# Patient Record
Sex: FEMALE | Race: WHITE | HISPANIC OR LATINO | Employment: FULL TIME | URBAN - METROPOLITAN AREA
[De-identification: names, ages, dates, MRNs, and addresses within clinical notes are randomized per-mention and may not be internally consistent; named-entity substitution may affect disease eponyms.]

---

## 2017-01-14 ENCOUNTER — HOSPITAL ENCOUNTER (INPATIENT)
Facility: MEDICAL CENTER | Age: 27
LOS: 4 days | DRG: 862 | End: 2017-01-18
Attending: EMERGENCY MEDICINE | Admitting: INTERNAL MEDICINE
Payer: MEDICAID

## 2017-01-14 ENCOUNTER — APPOINTMENT (OUTPATIENT)
Dept: RADIOLOGY | Facility: MEDICAL CENTER | Age: 27
DRG: 862 | End: 2017-01-14
Attending: EMERGENCY MEDICINE
Payer: MEDICAID

## 2017-01-14 ENCOUNTER — RESOLUTE PROFESSIONAL BILLING HOSPITAL PROF FEE (OUTPATIENT)
Dept: HOSPITALIST | Facility: MEDICAL CENTER | Age: 27
End: 2017-01-14
Payer: MEDICAID

## 2017-01-14 ENCOUNTER — APPOINTMENT (OUTPATIENT)
Dept: RADIOLOGY | Facility: MEDICAL CENTER | Age: 27
DRG: 862 | End: 2017-01-14
Attending: INTERNAL MEDICINE
Payer: MEDICAID

## 2017-01-14 DIAGNOSIS — D70.9 NEUTROPENIA, UNSPECIFIED TYPE (HCC): ICD-10-CM

## 2017-01-14 DIAGNOSIS — R11.2 NAUSEA AND VOMITING, INTRACTABILITY OF VOMITING NOT SPECIFIED, UNSPECIFIED VOMITING TYPE: ICD-10-CM

## 2017-01-14 PROBLEM — A41.9 SEPSIS (HCC): Status: ACTIVE | Noted: 2017-01-14

## 2017-01-14 LAB
ALBUMIN SERPL BCP-MCNC: 4.6 G/DL (ref 3.2–4.9)
ALBUMIN/GLOB SERPL: 1 G/DL
ALP SERPL-CCNC: 127 U/L (ref 30–99)
ALT SERPL-CCNC: 48 U/L (ref 2–50)
ANION GAP SERPL CALC-SCNC: 12 MMOL/L (ref 0–11.9)
ANISOCYTOSIS BLD QL SMEAR: ABNORMAL
APPEARANCE UR: ABNORMAL
APTT PPP: 32.5 SEC (ref 24.7–36)
AST SERPL-CCNC: 19 U/L (ref 12–45)
BACTERIA #/AREA URNS HPF: ABNORMAL /HPF
BASOPHILS # BLD AUTO: 4.3 % (ref 0–1.8)
BASOPHILS # BLD: 0.06 K/UL (ref 0–0.12)
BILIRUB SERPL-MCNC: 0.6 MG/DL (ref 0.1–1.5)
BILIRUB UR QL STRIP.AUTO: NEGATIVE
BUN SERPL-MCNC: 10 MG/DL (ref 8–22)
CALCIUM SERPL-MCNC: 9.7 MG/DL (ref 8.5–10.5)
CHLORIDE SERPL-SCNC: 97 MMOL/L (ref 96–112)
CO2 SERPL-SCNC: 21 MMOL/L (ref 20–33)
COLOR UR: YELLOW
CORTIS SERPL-MCNC: 16.7 UG/DL (ref 0–23)
CREAT SERPL-MCNC: 0.52 MG/DL (ref 0.5–1.4)
CULTURE IF INDICATED INDCX: YES UA CULTURE
EOSINOPHIL # BLD AUTO: 0.08 K/UL (ref 0–0.51)
EOSINOPHIL NFR BLD: 5.2 % (ref 0–6.9)
EPI CELLS #/AREA URNS HPF: ABNORMAL /HPF
ERYTHROCYTE [DISTWIDTH] IN BLOOD BY AUTOMATED COUNT: 47.9 FL (ref 35.9–50)
FLUAV H1 2009 PAND RNA SPEC QL NAA+PROBE: NOT DETECTED
FLUAV RNA SPEC QL NAA+PROBE: NEGATIVE
FLUAV+FLUBV AG SPEC QL IA: NORMAL
FLUBV RNA SPEC QL NAA+PROBE: NEGATIVE
GFR SERPL CREATININE-BSD FRML MDRD: >60 ML/MIN/1.73 M 2
GLOBULIN SER CALC-MCNC: 4.6 G/DL (ref 1.9–3.5)
GLUCOSE SERPL-MCNC: 119 MG/DL (ref 65–99)
GLUCOSE UR STRIP.AUTO-MCNC: NEGATIVE MG/DL
HCG SERPL QL: NEGATIVE
HCT VFR BLD AUTO: 32.9 % (ref 37–47)
HGB BLD-MCNC: 10.6 G/DL (ref 12–16)
HYALINE CASTS #/AREA URNS LPF: ABNORMAL /LPF
INR PPP: 1.06 (ref 0.87–1.13)
KETONES UR STRIP.AUTO-MCNC: 10 MG/DL
LACTATE BLD-SCNC: 0.8 MMOL/L (ref 0.5–2)
LACTATE BLD-SCNC: 1.1 MMOL/L (ref 0.5–2)
LACTATE BLD-SCNC: 1.2 MMOL/L (ref 0.5–2)
LEUKOCYTE ESTERASE UR QL STRIP.AUTO: NEGATIVE
LG PLATELETS BLD QL SMEAR: NORMAL
LIPASE SERPL-CCNC: 6 U/L (ref 11–82)
LYMPHOCYTES # BLD AUTO: 0.71 K/UL (ref 1–4.8)
LYMPHOCYTES NFR BLD: 47 % (ref 22–41)
MAGNESIUM SERPL-MCNC: 2.1 MG/DL (ref 1.5–2.5)
MANUAL DIFF BLD: NORMAL
MCH RBC QN AUTO: 24.9 PG (ref 27–33)
MCHC RBC AUTO-ENTMCNC: 32.2 G/DL (ref 33.6–35)
MCV RBC AUTO: 77.2 FL (ref 81.4–97.8)
MICRO URNS: ABNORMAL
MICROCYTES BLD QL SMEAR: ABNORMAL
MONOCYTES # BLD AUTO: 0.38 K/UL (ref 0–0.85)
MONOCYTES NFR BLD AUTO: 25.2 % (ref 0–13.4)
MORPHOLOGY BLD-IMP: NORMAL
MUCOUS THREADS #/AREA URNS HPF: ABNORMAL /HPF
NEUTROPHILS # BLD AUTO: 0.27 K/UL (ref 2–7.15)
NEUTROPHILS NFR BLD: 17.4 % (ref 44–72)
NEUTS BAND NFR BLD MANUAL: 0.9 % (ref 0–10)
NITRITE UR QL STRIP.AUTO: NEGATIVE
NRBC # BLD AUTO: 0 K/UL
NRBC BLD AUTO-RTO: 0 /100 WBC
PH UR STRIP.AUTO: 6 [PH]
PHOSPHATE SERPL-MCNC: 2.5 MG/DL (ref 2.5–4.5)
PLATELET # BLD AUTO: 376 K/UL (ref 164–446)
PLATELET BLD QL SMEAR: NORMAL
PMV BLD AUTO: 8.9 FL (ref 9–12.9)
POTASSIUM SERPL-SCNC: 3.2 MMOL/L (ref 3.6–5.5)
PROT SERPL-MCNC: 9.2 G/DL (ref 6–8.2)
PROT UR QL STRIP: 70 MG/DL
PROTHROMBIN TIME: 14.1 SEC (ref 12–14.6)
RBC # BLD AUTO: 4.26 M/UL (ref 4.2–5.4)
RBC # URNS HPF: ABNORMAL /HPF
RBC BLD AUTO: PRESENT
RBC UR QL AUTO: ABNORMAL
SIGNIFICANT IND 70042: NORMAL
SITE SITE: NORMAL
SODIUM SERPL-SCNC: 130 MMOL/L (ref 135–145)
SOURCE SOURCE: NORMAL
SP GR UR STRIP.AUTO: 1.02
WBC # BLD AUTO: 1.5 K/UL (ref 4.8–10.8)
WBC #/AREA URNS HPF: ABNORMAL /HPF

## 2017-01-14 PROCEDURE — 80053 COMPREHEN METABOLIC PANEL: CPT

## 2017-01-14 PROCEDURE — 85007 BL SMEAR W/DIFF WBC COUNT: CPT

## 2017-01-14 PROCEDURE — 82533 TOTAL CORTISOL: CPT

## 2017-01-14 PROCEDURE — 87086 URINE CULTURE/COLONY COUNT: CPT

## 2017-01-14 PROCEDURE — 85730 THROMBOPLASTIN TIME PARTIAL: CPT

## 2017-01-14 PROCEDURE — 700105 HCHG RX REV CODE 258: Performed by: EMERGENCY MEDICINE

## 2017-01-14 PROCEDURE — 83605 ASSAY OF LACTIC ACID: CPT | Mod: 91

## 2017-01-14 PROCEDURE — 83735 ASSAY OF MAGNESIUM: CPT

## 2017-01-14 PROCEDURE — 96366 THER/PROPH/DIAG IV INF ADDON: CPT

## 2017-01-14 PROCEDURE — 770022 HCHG ROOM/CARE - ICU (200)

## 2017-01-14 PROCEDURE — 87503 INFLUENZA DNA AMP PROB ADDL: CPT

## 2017-01-14 PROCEDURE — 85027 COMPLETE CBC AUTOMATED: CPT

## 2017-01-14 PROCEDURE — 700111 HCHG RX REV CODE 636 W/ 250 OVERRIDE (IP): Performed by: EMERGENCY MEDICINE

## 2017-01-14 PROCEDURE — 87040 BLOOD CULTURE FOR BACTERIA: CPT

## 2017-01-14 PROCEDURE — A9270 NON-COVERED ITEM OR SERVICE: HCPCS | Performed by: INTERNAL MEDICINE

## 2017-01-14 PROCEDURE — 85610 PROTHROMBIN TIME: CPT

## 2017-01-14 PROCEDURE — 84703 CHORIONIC GONADOTROPIN ASSAY: CPT

## 2017-01-14 PROCEDURE — 84100 ASSAY OF PHOSPHORUS: CPT

## 2017-01-14 PROCEDURE — 99291 CRITICAL CARE FIRST HOUR: CPT

## 2017-01-14 PROCEDURE — 36415 COLL VENOUS BLD VENIPUNCTURE: CPT

## 2017-01-14 PROCEDURE — 71275 CT ANGIOGRAPHY CHEST: CPT

## 2017-01-14 PROCEDURE — 71010 DX-CHEST-PORTABLE (1 VIEW): CPT

## 2017-01-14 PROCEDURE — 96375 TX/PRO/DX INJ NEW DRUG ADDON: CPT

## 2017-01-14 PROCEDURE — 81001 URINALYSIS AUTO W/SCOPE: CPT

## 2017-01-14 PROCEDURE — 700111 HCHG RX REV CODE 636 W/ 250 OVERRIDE (IP)

## 2017-01-14 PROCEDURE — 96367 TX/PROPH/DG ADDL SEQ IV INF: CPT

## 2017-01-14 PROCEDURE — 78226 HEPATOBILIARY SYSTEM IMAGING: CPT

## 2017-01-14 PROCEDURE — 83690 ASSAY OF LIPASE: CPT

## 2017-01-14 PROCEDURE — 700117 HCHG RX CONTRAST REV CODE 255: Performed by: EMERGENCY MEDICINE

## 2017-01-14 PROCEDURE — 93010 ELECTROCARDIOGRAM REPORT: CPT | Performed by: INTERNAL MEDICINE

## 2017-01-14 PROCEDURE — 93005 ELECTROCARDIOGRAM TRACING: CPT | Performed by: INTERNAL MEDICINE

## 2017-01-14 PROCEDURE — 87400 INFLUENZA A/B EACH AG IA: CPT

## 2017-01-14 PROCEDURE — 96361 HYDRATE IV INFUSION ADD-ON: CPT

## 2017-01-14 PROCEDURE — 96368 THER/DIAG CONCURRENT INF: CPT

## 2017-01-14 PROCEDURE — 74177 CT ABD & PELVIS W/CONTRAST: CPT

## 2017-01-14 PROCEDURE — 87502 INFLUENZA DNA AMP PROBE: CPT

## 2017-01-14 PROCEDURE — 96376 TX/PRO/DX INJ SAME DRUG ADON: CPT

## 2017-01-14 PROCEDURE — 700105 HCHG RX REV CODE 258: Performed by: INTERNAL MEDICINE

## 2017-01-14 PROCEDURE — 700102 HCHG RX REV CODE 250 W/ 637 OVERRIDE(OP): Performed by: INTERNAL MEDICINE

## 2017-01-14 PROCEDURE — 96365 THER/PROPH/DIAG IV INF INIT: CPT

## 2017-01-14 PROCEDURE — 700101 HCHG RX REV CODE 250: Performed by: INTERNAL MEDICINE

## 2017-01-14 PROCEDURE — 700111 HCHG RX REV CODE 636 W/ 250 OVERRIDE (IP): Performed by: INTERNAL MEDICINE

## 2017-01-14 PROCEDURE — 99291 CRITICAL CARE FIRST HOUR: CPT | Performed by: INTERNAL MEDICINE

## 2017-01-14 RX ORDER — MORPHINE SULFATE 4 MG/ML
4 INJECTION, SOLUTION INTRAMUSCULAR; INTRAVENOUS ONCE
Status: COMPLETED | OUTPATIENT
Start: 2017-01-14 | End: 2017-01-14

## 2017-01-14 RX ORDER — CEFTRIAXONE 1 G/1
1 INJECTION, POWDER, FOR SOLUTION INTRAMUSCULAR; INTRAVENOUS ONCE
Status: COMPLETED | OUTPATIENT
Start: 2017-01-14 | End: 2017-01-14

## 2017-01-14 RX ORDER — DOCUSATE SODIUM 100 MG/1
100 CAPSULE, LIQUID FILLED ORAL DAILY
COMMUNITY
End: 2018-06-13

## 2017-01-14 RX ORDER — SODIUM CHLORIDE 9 MG/ML
30 INJECTION, SOLUTION INTRAVENOUS
Status: DISCONTINUED | OUTPATIENT
Start: 2017-01-14 | End: 2017-01-18 | Stop reason: HOSPADM

## 2017-01-14 RX ORDER — ONDANSETRON 2 MG/ML
4 INJECTION INTRAMUSCULAR; INTRAVENOUS ONCE
Status: COMPLETED | OUTPATIENT
Start: 2017-01-14 | End: 2017-01-14

## 2017-01-14 RX ORDER — SODIUM CHLORIDE 9 MG/ML
INJECTION, SOLUTION INTRAVENOUS CONTINUOUS
Status: DISCONTINUED | OUTPATIENT
Start: 2017-01-14 | End: 2017-01-14

## 2017-01-14 RX ORDER — POTASSIUM CHLORIDE 20 MEQ/1
40 TABLET, EXTENDED RELEASE ORAL ONCE
Status: COMPLETED | OUTPATIENT
Start: 2017-01-14 | End: 2017-01-14

## 2017-01-14 RX ORDER — DEXTROSE MONOHYDRATE, SODIUM CHLORIDE, AND POTASSIUM CHLORIDE 50; 2.98; 9 G/1000ML; G/1000ML; G/1000ML
INJECTION, SOLUTION INTRAVENOUS CONTINUOUS
Status: DISCONTINUED | OUTPATIENT
Start: 2017-01-14 | End: 2017-01-15

## 2017-01-14 RX ORDER — OXYCODONE HYDROCHLORIDE 5 MG/1
5 TABLET ORAL EVERY 4 HOURS PRN
Status: DISCONTINUED | OUTPATIENT
Start: 2017-01-14 | End: 2017-01-18 | Stop reason: HOSPADM

## 2017-01-14 RX ORDER — POTASSIUM CHLORIDE 7.45 MG/ML
10 INJECTION INTRAVENOUS ONCE
Status: COMPLETED | OUTPATIENT
Start: 2017-01-14 | End: 2017-01-14

## 2017-01-14 RX ORDER — SODIUM CHLORIDE 9 MG/ML
1000 INJECTION, SOLUTION INTRAVENOUS ONCE
Status: COMPLETED | OUTPATIENT
Start: 2017-01-14 | End: 2017-01-14

## 2017-01-14 RX ORDER — DEXTROSE MONOHYDRATE, SODIUM CHLORIDE, AND POTASSIUM CHLORIDE 50; 1.49; 9 G/1000ML; G/1000ML; G/1000ML
INJECTION, SOLUTION INTRAVENOUS CONTINUOUS
Status: CANCELLED | OUTPATIENT
Start: 2017-01-14

## 2017-01-14 RX ORDER — ONDANSETRON 2 MG/ML
4 INJECTION INTRAMUSCULAR; INTRAVENOUS EVERY 4 HOURS PRN
Status: DISCONTINUED | OUTPATIENT
Start: 2017-01-14 | End: 2017-01-18 | Stop reason: HOSPADM

## 2017-01-14 RX ORDER — ACETAMINOPHEN 500 MG
500 TABLET ORAL EVERY 6 HOURS PRN
Status: DISCONTINUED | OUTPATIENT
Start: 2017-01-14 | End: 2017-01-18 | Stop reason: HOSPADM

## 2017-01-14 RX ORDER — MORPHINE SULFATE 4 MG/ML
2 INJECTION, SOLUTION INTRAMUSCULAR; INTRAVENOUS EVERY 4 HOURS PRN
Status: DISCONTINUED | OUTPATIENT
Start: 2017-01-14 | End: 2017-01-18 | Stop reason: HOSPADM

## 2017-01-14 RX ORDER — TRAMADOL HYDROCHLORIDE 50 MG/1
50 TABLET ORAL EVERY 6 HOURS PRN
Status: DISCONTINUED | OUTPATIENT
Start: 2017-01-14 | End: 2017-01-18 | Stop reason: HOSPADM

## 2017-01-14 RX ORDER — AZITHROMYCIN 500 MG/1
500 INJECTION, POWDER, LYOPHILIZED, FOR SOLUTION INTRAVENOUS ONCE
Status: COMPLETED | OUTPATIENT
Start: 2017-01-14 | End: 2017-01-14

## 2017-01-14 RX ORDER — ONDANSETRON 4 MG/1
4 TABLET, ORALLY DISINTEGRATING ORAL EVERY 4 HOURS PRN
Status: DISCONTINUED | OUTPATIENT
Start: 2017-01-14 | End: 2017-01-18 | Stop reason: HOSPADM

## 2017-01-14 RX ORDER — SODIUM CHLORIDE 9 MG/ML
1000 INJECTION, SOLUTION INTRAVENOUS
Status: DISCONTINUED | OUTPATIENT
Start: 2017-01-14 | End: 2017-01-18 | Stop reason: HOSPADM

## 2017-01-14 RX ADMIN — MORPHINE SULFATE 2 MG: 4 INJECTION INTRAVENOUS at 10:15

## 2017-01-14 RX ADMIN — ONDANSETRON 4 MG: 2 INJECTION, SOLUTION INTRAMUSCULAR; INTRAVENOUS at 10:15

## 2017-01-14 RX ADMIN — ACETAMINOPHEN 500 MG: 500 TABLET, FILM COATED ORAL at 21:50

## 2017-01-14 RX ADMIN — IOHEXOL 100 ML: 350 INJECTION, SOLUTION INTRAVENOUS at 11:45

## 2017-01-14 RX ADMIN — POTASSIUM CHLORIDE, DEXTROSE MONOHYDRATE AND SODIUM CHLORIDE: 300; 5; 900 INJECTION, SOLUTION INTRAVENOUS at 15:25

## 2017-01-14 RX ADMIN — AZITHROMYCIN FOR INJECTION INJECTION, POWDER, LYOPHILIZED, FOR SOLUTION 500 MG: 500 INJECTION INTRAVENOUS at 11:58

## 2017-01-14 RX ADMIN — PIPERACILLIN AND TAZOBACTAM 4.5 G: 4; .5 INJECTION, POWDER, LYOPHILIZED, FOR SOLUTION INTRAVENOUS; PARENTERAL at 13:32

## 2017-01-14 RX ADMIN — TRAMADOL HYDROCHLORIDE 50 MG: 50 TABLET, COATED ORAL at 21:50

## 2017-01-14 RX ADMIN — SODIUM CHLORIDE 1000 ML: 9 INJECTION, SOLUTION INTRAVENOUS at 10:14

## 2017-01-14 RX ADMIN — VANCOMYCIN HYDROCHLORIDE 1600 MG: 10 INJECTION, POWDER, LYOPHILIZED, FOR SOLUTION INTRAVENOUS at 14:25

## 2017-01-14 RX ADMIN — SODIUM CHLORIDE 1000 ML: 9 INJECTION, SOLUTION INTRAVENOUS at 12:45

## 2017-01-14 RX ADMIN — SODIUM CHLORIDE 1000 ML: 9 INJECTION, SOLUTION INTRAVENOUS at 13:31

## 2017-01-14 RX ADMIN — POTASSIUM CHLORIDE 10 MEQ: 7.46 INJECTION, SOLUTION INTRAVENOUS at 13:31

## 2017-01-14 RX ADMIN — PIPERACILLIN AND TAZOBACTAM 4.5 G: 4; .5 INJECTION, POWDER, LYOPHILIZED, FOR SOLUTION INTRAVENOUS; PARENTERAL at 19:28

## 2017-01-14 RX ADMIN — ONDANSETRON 4 MG: 2 INJECTION, SOLUTION INTRAMUSCULAR; INTRAVENOUS at 21:50

## 2017-01-14 RX ADMIN — MORPHINE SULFATE 2 MG: 4 INJECTION INTRAVENOUS at 11:58

## 2017-01-14 RX ADMIN — TRAMADOL HYDROCHLORIDE 50 MG: 50 TABLET, COATED ORAL at 15:21

## 2017-01-14 RX ADMIN — ONDANSETRON 4 MG: 2 INJECTION, SOLUTION INTRAMUSCULAR; INTRAVENOUS at 18:11

## 2017-01-14 RX ADMIN — POTASSIUM CHLORIDE 40 MEQ: 1500 TABLET, EXTENDED RELEASE ORAL at 21:51

## 2017-01-14 RX ADMIN — ACETAMINOPHEN 500 MG: 500 TABLET, FILM COATED ORAL at 15:52

## 2017-01-14 RX ADMIN — PIPERACILLIN AND TAZOBACTAM 4.5 G: 4; .5 INJECTION, POWDER, LYOPHILIZED, FOR SOLUTION INTRAVENOUS; PARENTERAL at 23:44

## 2017-01-14 RX ADMIN — CEFTRIAXONE 1 G: 1 INJECTION, POWDER, FOR SOLUTION INTRAMUSCULAR; INTRAVENOUS at 10:15

## 2017-01-14 RX ADMIN — ONDANSETRON 4 MG: 2 INJECTION, SOLUTION INTRAMUSCULAR; INTRAVENOUS at 11:58

## 2017-01-14 ASSESSMENT — PAIN SCALES - GENERAL
PAINLEVEL_OUTOF10: 6

## 2017-01-14 ASSESSMENT — LIFESTYLE VARIABLES: DO YOU DRINK ALCOHOL: NO

## 2017-01-14 NOTE — ED NOTES
Spoke to NM tech, states unsure if HIDA scan with be done today but states since scan is to r/o biliary leak pt does NOT have to be NPO and off of pain meds. MD Armando notified, states ok for pt to have water.     MD also updated that pain c/o pain, see new orders.

## 2017-01-14 NOTE — ED PROVIDER NOTES
ED Provider Note    CHIEF COMPLAINT  Chief Complaint   Patient presents with   • Post-Op Complications     jose a on 12/20   • N/V       HPI  Vanessa Ballesteros is a 26 y.o. female who presents complaining of fever vomiting and congestion. The patient says that on the 20th of last month she had her gallbladder removed. For the last 3 days she has been having fever at home and nausea and vomiting and says she is unable to tolerate much oral intake. She was able to take ibuprofen at 7:30 AM this morning. She is also felt a lot of sinus congestion and has a mild cough. She does not recognize any other precipitating events or exacerbating or alleviating factors.    REVIEW OF SYSTEMS no black or bloody stool or emesis no hemoptysis no pain or discomfort with urination. All other systems negative    PAST MEDICAL HISTORY  Past Medical History   Diagnosis Date   • Microcytic anemia 2/14/2016    the patient has a history of intermittent neutropenia thought to be familial however the patient has neglected to follow up with hematology and does not know what her definitive diagnosis is.    FAMILY HISTORY  Family History   Problem Relation Age of Onset   • Heart Attack Mother    • Stroke Mother    • Hypertension Maternal Grandmother    • Diabetes Maternal Grandfather    • Cancer Paternal Grandmother      lung cancer        SOCIAL HISTORY  Social History     Social History   • Marital Status: Single     Spouse Name: N/A   • Number of Children: N/A   • Years of Education: N/A     Social History Main Topics   • Smoking status: Former Smoker -- 0.25 packs/day for 2 years     Types: Cigarettes   • Smokeless tobacco: None      Comment: quit 12/12   • Alcohol Use: No   • Drug Use: No   • Sexual Activity:     Partners: Male      Comment: none      Other Topics Concern   • None     Social History Narrative       SURGICAL HISTORY  Past Surgical History   Procedure Laterality Date   • Jose A by laparoscopy  12/20/2016     Procedure: JOSE A BY  "LAPAROSCOPY;  Surgeon: Cem Pedro M.D.;  Location: SURGERY [facility];  Service:        CURRENT MEDICATIONS  Home Medications     Reviewed by Denise Gutierrez (Pharmacy Tech) on 01/14/17 at 1151  Med List Status: Complete    Medication Last Dose Status    docusate sodium (COLACE) 100 MG Cap 1/13/2017 Active    etonogestrel (NEXPLANON) 68 MG Implant implant 11/24/2014 Active    hydrocodone-acetaminophen (NORCO) 5-325 MG Tab per tablet 1/13/2017 Active                ALLERGIES  No Known Allergies    PHYSICAL EXAM  VITAL SIGNS: /66 mmHg  Pulse 118  Temp(Src) 37.5 °C (99.5 °F) (Temporal)  Resp 16  Ht 1.499 m (4' 11.02\")  Wt 62.6 kg (138 lb 0.1 oz)  BMI 27.86 kg/m2  SpO2 93%  LMP 12/20/2016   Oxygen saturation is interpreted as adequate  Constitutional: Awake and ill-appearing  HENT: Mucous membranes are dry  Eyes: No erythema or discharge or jaundice  Neck: Trachea midline no JVD no meningeal findings  Cardiovascular: Regular tachycardia  Lungs: Clear and equal bilaterally with no apparent difficulty breathing  Abdomen/Back: Obese soft diffusely mildly tender but she does not have a rigid abdomen or surgical peritoneal findings. There are multiple surgical port wounds on the abdominal wall there is slight surrounding erythema but I don't see any pus or discharge  Skin: Warm and dry  Musculoskeletal: No leg edema or calf tenderness  Neurologic: Awake and verbal and moving all extremities    CHART REVIEW  The patient's neutropenia has been noted multiple times in different hospital notes and she has been advised multiple times to follow-up with hematology. She has previously been seen by Dr. Rapp in the hospital but never followed up in the office.    Laboratory  Today a CBC shows a low white blood cell count of 1.5 with an absolute neutrophil count of 270. Hemoglobin is adequate at 10.6 and the platelet count is adequate at 376. Basic metabolic panel showed a very slightly depressed " potassium of 3.2. Alk phos is minimally elevated at 127 and lipase is normal at 6 lactic acid is normal at 1.2 INR is normal at 1.06. Urinalysis was negative for nitrite and leukocyte esterase but positive for ketones and the microscopic exam shows 5-10 white blood cells as well as 5-10 red blood cells and a few bacteria. Pregnancy test is negative.    Radiology  CT-ABDOMEN-PELVIS WITH   Final Result      1.  Interval cholecystectomy. Minimal fluid in the gallbladder fossa which is probably normal in amount postoperatively. No biliary dilation      2.  Fatty filtration the liver and mild hepatomegaly      3.  5 cm cystic structure in the right adnexa consistent with either ovarian cyst or hydrosalpinx      CT-CTA CHEST PULMONARY ARTERY W/ RECONS   Final Result      1.  Negative for pulmonary embolism or other acute thoracic abnormality      2.  Dilation of main pulmonary artery measuring 3.5 cm. This could indicate pulmonary artery hypertension. Recommend further evaluation with echocardiography      3.  Fatty infiltration of the liver               DX-CHEST-PORTABLE (1 VIEW)   Final Result      Negative single view of the chest.      NM-BILIARY (HIDA) SCAN WITH CCK    (Results Pending)     MEDICAL DECISION MAKING and DISPOSITION  In the emergency department an IV was established the patient was given intravenous fluids as well as morphine and Zofran and intravenous antibiotics. I reviewed all the findings with the patient. I reviewed the case with the hospitalist and the patient is admitted for further evaluation and further treatment. I have also reviewed the case with the patient's surgeon Dr. Gonzales.    IMPRESSION  1. Postop fever  2. Nausea and vomiting  3. Neutropenia  4. Abnormal urinalysis rule out urinary tract infection  5. Critical care time with this patient is 35 minutes         Electronically signed by: Ignacio Toscano, 1/14/2017 3:34 PM

## 2017-01-14 NOTE — IP AVS SNAPSHOT
" <p align=\"LEFT\"><IMG SRC=\"//EMRWB/blob$/Images/[email or web address]\" alt=\"Image\" WIDTH=\"50%\" HEIGHT=\"200\" BORDER=\"\"></p>                   Name:Vanessa Ballesteros  Medical Record Number:[id number]  CSN: [id number]    YOB: 1990   Age: 26 y.o.  Sex: female  HT:1.499 m (4' 11.02\") WT: 62.8 kg (138 lb 7.2 oz)          Admit Date: 1/14/2017     Discharge Date:   Today's Date: 1/18/2017  Attending Doctor:  Billie Ontiveros D.O.                  Allergies:  Review of patient's allergies indicates no known allergies.          Follow-up Information     1. Follow up with Diana Quijano M.D. In 1 week.    Specialty:  Oncology    Contact information    [address]  [address]  [facility] [id number] [phone]          2. Follow up with Cem Pedro M.D. In 1 week.    Specialty:  Surgery    Contact information    [address] [facility]  [address]  [facility] [id number] [phone]           Medication List      Take these Medications        Instructions    amoxicillin-clavulanate 875-125 MG Tabs   Commonly known as:  AUGMENTIN    Take 1 Tab by mouth every 12 hours for 7 days.   Dose:  1 Tab       docusate sodium 100 MG Caps   Commonly known as:  COLACE    Take 100 mg by mouth every day.   Dose:  100 mg       hydrocodone-acetaminophen 5-325 MG Tabs per tablet   Commonly known as:  NORCO    Take 1-2 Tabs by mouth every 6 hours as needed.   Dose:  1-2 Tab       NEXPLANON 68 MG Impl implant   Generic drug:  etonogestrel    Inject 1 Each as instructed Once.   Dose:  1 Each         "

## 2017-01-14 NOTE — IP AVS SNAPSHOT
[employer] Access Code: [email or web address]  Expires: 1/19/2017  3:03 AM    [employer]  A secure, online tool to manage your health information     [employer]’s [employer]® is a secure, online tool that connects you to your personalized health information from the privacy of your home -- day or night - making it very easy for you to manage your healthcare. Once the activation process is completed, you can even access your medical information using the [employer] michelle, which is available for free in the Apple Michelle store or Google Play store.     [employer] provides the following levels of access (as shown below):   My Chart Features   [facility] Primary Care Doctor [facility]  Specialists [facility]  Urgent  Care Non-[facility]  Primary Care  Doctor   Email your healthcare team securely and privately 24/7 X X X X   Manage appointments: schedule your next appointment; view details of past/upcoming appointments X      Request prescription refills. X      View recent personal medical records, including lab and immunizations X X X X   View health record, including health history, allergies, medications X X X X   Read reports about your outpatient visits, procedures, consult and ER notes X X X X   See your discharge summary, which is a recap of your hospital and/or ER visit that includes your diagnosis, lab results, and care plan. X X       How to register for [employer]:  1. Go to  [email or web address].  2. Click on the Sign Up Now box, which takes you to the New Member Sign Up page. You will need to provide the following information:  a. Enter your [employer] Access Code exactly as it appears at the top of this page. (You will not need to use this code after you’ve completed the sign-up process. If you do not sign up before the expiration date, you must request a new code.)   b. Enter your date of birth.   c. Enter your home email address.   d. Click Submit, and follow the next screen’s instructions.  3. Create a [employer] ID. This will be your [employer]  login ID and cannot be changed, so think of one that is secure and easy to remember.  4. Create a [employer] password. You can change your password at any time.  5. Enter your Password Reset Question and Answer. This can be used at a later time if you forget your password.   6. Enter your e-mail address. This allows you to receive e-mail notifications when new information is available in [employer].  7. Click Sign Up. You can now view your health information.    For assistance activating your [employer] account, call [phone]

## 2017-01-14 NOTE — ED NOTES
Chief Complaint   Patient presents with   • Post-Op Complications     jose a on 12/20   • N/V

## 2017-01-14 NOTE — PROGRESS NOTES
"Pharmacy Kinetics 26 y.o. female on vancomycin day # 1 2017    Currently on Vancomycin 1600 mg IV x1 followed by 1300 mg IV q8h    Indication for Treatment: empiric, neutropenic     Pertinent history per medical record: Admitted on 2017 for sepsis. Presented to ED with 3 days onset of N/V and subjective fevers. She had her gallbladder out 16. She has a h/o being neutropenic, for which she never followed up with hematology to find out definitive diagnosis. Her abdominal CT was negative and CXR clear.    Other antibiotics: Zosyn 4.5 g IV q6h    Allergies: Review of patient's allergies indicates no known allergies.     List concerns for renal function: abdominal CT     Pertinent cultures to date:   17:  Blood, peripheral x2 = in process  17:  Urine = in process    Recent Labs      17   0940   WBC  1.5*   NEUTSPOLYS  17.40*   BANDSSTABS  0.90     Recent Labs      17   0940   BUN  10   CREATININE  0.52   ALBUMIN  4.6     Blood pressure 100/66, pulse 104, temperature 36.5 °C (97.7 °F), temperature source Temporal, resp. rate 16, height 1.499 m (4' 11.02\"), weight 62.6 kg (138 lb 0.1 oz), last menstrual period 2016, SpO2 99 %. Temp (24hrs), Av.3 °C (97.3 °F), Min:36.1 °C (96.9 °F), Max:36.5 °C (97.7 °F)      A/P   1. Vancomycin dose change: new start  2. Next vancomycin level:  at 0730  3. Goal trough: 16-20 mcg/mL  4. Comments: Patient profoundly neutropenic, this is not new for her. Her ANC is 270. She was hypotensive in ED, but responded well to IVF. Per d/w admitting MD, he sees no real source of infection aside from maybe her incision sites. Patient has good renal function will start per protocol and check a level in a couple of days.     Janes Story, PharmD, BCPS        "

## 2017-01-14 NOTE — IP AVS SNAPSHOT
1/18/2017          Vanessa Ballesteros  [address]  [place] [address]    Dear Vanessa:    [facility] wants to ensure your discharge home is safe and you or your loved ones have had all your questions answered regarding your care after you leave the hospital.    You may receive a telephone call within two days of your discharge.  This call is to make certain you understand your discharge instructions as well as ensure we provided you with the best care possible during your stay with us.     The call will only last approximately 3-5 minutes and will be done by a nurse.    Once again, we want to ensure your discharge home is safe and that you have a clear understanding of any next steps in your care.  If you have any questions or concerns, please do not hesitate to contact us, we are here for you.  Thank you for choosing [facility] for your healthcare needs.    Sincerely,    Kamar Watson    [facility]

## 2017-01-14 NOTE — IP AVS SNAPSHOT
" After Visit Summary                                                                                                                  Name:Vanessa Ballesteros  Medical Record Number:[id number]  CSN: [id number]    YOB: 1990   Age: 26 y.o.  Sex: female  HT:1.499 m (4' 11.02\") WT: 62.8 kg (138 lb 7.2 oz)          Admit Date: 1/14/2017     Discharge Date:   Today's Date: 1/18/2017  Attending Doctor:  Billie Ontiveros D.O.                  Allergies:  Review of patient's allergies indicates no known allergies.            Discharge Instructions       Discharge Instructions    Discharged to home by car with self. Discharged via wheelchair, hospital escort: Yes.  Special equipment needed: Not Applicable    Be sure to schedule a follow-up appointment with your primary care doctor or any specialists as instructed.     Discharge Plan:   Diet Plan: Discussed  Activity Level: Discussed  Confirmed Follow up Appointment: Patient to Call and Schedule Appointment  Confirmed Symptoms Management: Discussed  Medication Reconciliation Updated: Yes  Influenza Vaccine Indication: Patient Refuses    I understand that a diet low in cholesterol, fat, and sodium is recommended for good health. Unless I have been given specific instructions below for another diet, I accept this instruction as my diet prescription.   Other diet: Regular diet as tolerated    Special Instructions: None    · Is patient discharged on Warfarin / Coumadin?   No     · Is patient Post Blood Transfusion?  No    Depression / Suicide Risk    As you are discharged from this [facility] facility, it is important to learn how to keep safe from harming yourself.    Recognize the warning signs:  · Abrupt changes in personality, positive or negative- including increase in energy   · Giving away possessions  · Change in eating patterns- significant weight changes-  positive or negative  · Change in sleeping patterns- unable to sleep or sleeping all the " time   · Unwillingness or inability to communicate  · Depression  · Unusual sadness, discouragement and loneliness  · Talk of wanting to die  · Neglect of personal appearance   · Rebelliousness- reckless behavior  · Withdrawal from people/activities they love  · Confusion- inability to concentrate     If you or a loved one observes any of these behaviors or has concerns about self-harm, here's what you can do:  · Talk about it- your feelings and reasons for harming yourself  · Remove any means that you might use to hurt yourself (examples: pills, rope, extension cords, firearm)  · Get professional help from the community (Mental Health, Substance Abuse, psychological counseling)  · Do not be alone:Call your Safe Contact- someone whom you trust who will be there for you.  · Call your local CRISIS HOTLINE 423-3205 or 082-382-6174  · Call your local Children's Mobile Crisis Response Team [place] (950) 403-9086 or www.[employer]  · Call the toll free National Suicide Prevention Hotlines   · National Suicide Prevention Lifeline 254-029-OEZW (6334)  · [facility] Hope Line Network 800-SUICIDE (705-2770)        Follow-up Information     1. Follow up with Diana Quijano M.D. In 1 week.    Specialty:  Oncology    Contact information    [address]  [address]  [facility] [address] [phone]          2. Follow up with Cem Pedro M.D. In 1 week.    Specialty:  Surgery    Contact information    [address] [facility]  [address]  [facility] [address] [phone]           Discharge Medication Instructions:    Below are the medications your physician expects you to take upon discharge:    Review all your home medications and newly ordered medications with your doctor and/or pharmacist. Follow medication instructions as directed by your doctor and/or pharmacist.    Please keep your medication list with you and share with your physician.               Medication List      START taking these medications         Instructions    amoxicillin-clavulanate 875-125 MG Tabs   Commonly known as:  AUGMENTIN    Take 1 Tab by mouth every 12 hours for 7 days.   Dose:  1 Tab         CONTINUE taking these medications        Instructions    docusate sodium 100 MG Caps   Commonly known as:  COLACE    Take 100 mg by mouth every day.   Dose:  100 mg       hydrocodone-acetaminophen 5-325 MG Tabs per tablet   Commonly known as:  NORCO    Take 1-2 Tabs by mouth every 6 hours as needed.   Dose:  1-2 Tab       NEXPLANON 68 MG Impl implant   Generic drug:  etonogestrel    Inject 1 Each as instructed Once.   Dose:  1 Each               Instructions           Diet / Nutrition:    Follow any diet instructions given to you by your doctor or the dietician, including how much salt (sodium) you are allowed each day.    If you are overweight, talk to your doctor about a weight reduction plan.    Activity:    Remain physically active following your doctor's instructions about exercise and activity.    Rest often.     Any time you become even a little tired or short of breath, SIT DOWN and rest.    Worsening Symptoms:    Report any of the following signs and symptoms to the doctor's office immediately:    *Pain of jaw, arm, or neck  *Chest pain not relieved by medication                               *Dizziness or loss of consciousness  *Difficulty breathing even when at rest   *More tired than usual                                       *Bleeding drainage or swelling of surgical site  *Swelling of feet, ankles, legs or stomach                 *Fever (>100ºF)  *Pink or blood tinged sputum  *Weight gain (3lbs/day or 5lbs /week)           *Shock from internal defibrillator (if applicable)  *Palpitations or irregular heartbeats                *Cool and/or numb extremities    Stroke Awareness    Common Risk Factors for Stroke include:    Age  Atrial Fibrillation  Carotid Artery Stenosis  Diabetes Mellitus  Excessive alcohol consumption  High blood  pressure  Overweight   Physical inactivity  Smoking    Warning signs and symptoms of a stroke include:    *Sudden numbness or weakness of the face, arm or leg (especially on one side of the body).  *Sudden confusion, trouble speaking or understanding.  *Sudden trouble seeing in one or both eyes.  *Sudden trouble walking, dizziness, loss of balance or coordination.Sudden severe headache with no known cause.    It is very important to get treatment quickly when a stroke occurs. If you experience any of the above warning signs, call 911 immediately.                   Disclaimer         Quit Smoking / Tobacco Use:    I understand the use of any tobacco products increases my chance of suffering from future heart disease or stroke and could cause other illnesses which may shorten my life. Quitting the use of tobacco products is the single most important thing I can do to improve my health. For further information on smoking / tobacco cessation call a Toll Free Quit Line at 1-395.669.3779 (*[employer] [place]) or 1-789.990.2708 (American Lung Association) or you can access the web based program at www.[employer].org.    [place] Tobacco Users Help Line:  (930) 928-6618       Toll Free: 1-172.745.9725  Quit Tobacco Program [facility] (710)673-7247    Crisis Hotline:    [place] Crisis Hotline:  8-452-JQXABWT or 1-700.831.6112    [place] Crisis Hotline:    1-470.647.9250 or 088-925-5247    Discharge Survey:   Thank you for choosing [facility]. We hope we did everything we could to make your hospital stay a pleasant one. You may be receiving a phone survey and we would appreciate your time and participation in answering the questions. Your input is very valuable to us in our efforts to improve our service to our patients and their families.        My signature on this form indicates that:    1. I have reviewed and understand the above information.  2. My questions regarding this information have  been answered to my satisfaction.  3. I have formulated a plan with my discharge nurse to obtain my prescribed medications for home.                  Disclaimer         __________________________________                     __________       ________                       Patient Signature                                                 Date                    Time

## 2017-01-14 NOTE — ED NOTES
Lab called with critical result at  1028, WBC 1.5, neutrophil 0.19 . Critical lab result read back to .  Dr. Toscano  Notified of critical lab result at 1028

## 2017-01-15 PROBLEM — T81.49XA CELLULITIS, WOUND, POST-OPERATIVE: Status: ACTIVE | Noted: 2017-01-15

## 2017-01-15 PROBLEM — R50.81 NEUTROPENIC FEVER (HCC): Status: ACTIVE | Noted: 2017-01-15

## 2017-01-15 PROBLEM — D70.9 NEUTROPENIC FEVER (HCC): Status: ACTIVE | Noted: 2017-01-15

## 2017-01-15 PROBLEM — R77.1 ELEVATED SERUM GLOBULIN LEVEL: Status: ACTIVE | Noted: 2017-01-15

## 2017-01-15 LAB
ALBUMIN SERPL BCP-MCNC: 4 G/DL (ref 3.2–4.9)
ALBUMIN/GLOB SERPL: 0.9 G/DL
ALP SERPL-CCNC: 108 U/L (ref 30–99)
ALT SERPL-CCNC: 33 U/L (ref 2–50)
ANION GAP SERPL CALC-SCNC: 9 MMOL/L (ref 0–11.9)
ANISOCYTOSIS BLD QL SMEAR: ABNORMAL
AST SERPL-CCNC: 16 U/L (ref 12–45)
BASOPHILS # BLD AUTO: 1.8 % (ref 0–1.8)
BASOPHILS # BLD: 0.05 K/UL (ref 0–0.12)
BILIRUB SERPL-MCNC: 0.8 MG/DL (ref 0.1–1.5)
BUN SERPL-MCNC: 3 MG/DL (ref 8–22)
CALCIUM SERPL-MCNC: 9.2 MG/DL (ref 8.5–10.5)
CHLORIDE SERPL-SCNC: 102 MMOL/L (ref 96–112)
CO2 SERPL-SCNC: 23 MMOL/L (ref 20–33)
CREAT SERPL-MCNC: 0.5 MG/DL (ref 0.5–1.4)
EKG IMPRESSION: NORMAL
EOSINOPHIL # BLD AUTO: 0.59 K/UL (ref 0–0.51)
EOSINOPHIL NFR BLD: 21.2 % (ref 0–6.9)
ERYTHROCYTE [DISTWIDTH] IN BLOOD BY AUTOMATED COUNT: 49.5 FL (ref 35.9–50)
GFR SERPL CREATININE-BSD FRML MDRD: >60 ML/MIN/1.73 M 2
GLOBULIN SER CALC-MCNC: 4.5 G/DL (ref 1.9–3.5)
GLUCOSE SERPL-MCNC: 122 MG/DL (ref 65–99)
HCT VFR BLD AUTO: 34.6 % (ref 37–47)
HGB BLD-MCNC: 10.3 G/DL (ref 12–16)
HYPOCHROMIA BLD QL SMEAR: ABNORMAL
LG PLATELETS BLD QL SMEAR: NORMAL
LYMPHOCYTES # BLD AUTO: 1.54 K/UL (ref 1–4.8)
LYMPHOCYTES NFR BLD: 54.9 % (ref 22–41)
MAGNESIUM SERPL-MCNC: 2.1 MG/DL (ref 1.5–2.5)
MANUAL DIFF BLD: NORMAL
MCH RBC QN AUTO: 23.7 PG (ref 27–33)
MCHC RBC AUTO-ENTMCNC: 29.8 G/DL (ref 33.6–35)
MCV RBC AUTO: 79.7 FL (ref 81.4–97.8)
MICROCYTES BLD QL SMEAR: ABNORMAL
MONOCYTES # BLD AUTO: 0.4 K/UL (ref 0–0.85)
MONOCYTES NFR BLD AUTO: 14.2 % (ref 0–13.4)
MORPHOLOGY BLD-IMP: NORMAL
NEUTROPHILS # BLD AUTO: 0.22 K/UL (ref 2–7.15)
NEUTROPHILS NFR BLD: 3.5 % (ref 44–72)
NEUTS BAND NFR BLD MANUAL: 4.4 % (ref 0–10)
NRBC # BLD AUTO: 0 K/UL
NRBC BLD AUTO-RTO: 0 /100 WBC
PHOSPHATE SERPL-MCNC: 2.7 MG/DL (ref 2.5–4.5)
PLATELET # BLD AUTO: 356 K/UL (ref 164–446)
PLATELET BLD QL SMEAR: NORMAL
PMV BLD AUTO: 9.1 FL (ref 9–12.9)
POTASSIUM SERPL-SCNC: 4.1 MMOL/L (ref 3.6–5.5)
PROT SERPL-MCNC: 8.5 G/DL (ref 6–8.2)
RBC # BLD AUTO: 4.34 M/UL (ref 4.2–5.4)
RBC BLD AUTO: PRESENT
SODIUM SERPL-SCNC: 134 MMOL/L (ref 135–145)
WBC # BLD AUTO: 2.8 K/UL (ref 4.8–10.8)

## 2017-01-15 PROCEDURE — 700111 HCHG RX REV CODE 636 W/ 250 OVERRIDE (IP): Performed by: INTERNAL MEDICINE

## 2017-01-15 PROCEDURE — 700111 HCHG RX REV CODE 636 W/ 250 OVERRIDE (IP)

## 2017-01-15 PROCEDURE — 700101 HCHG RX REV CODE 250: Performed by: HOSPITALIST

## 2017-01-15 PROCEDURE — 700102 HCHG RX REV CODE 250 W/ 637 OVERRIDE(OP): Performed by: INTERNAL MEDICINE

## 2017-01-15 PROCEDURE — 99232 SBSQ HOSP IP/OBS MODERATE 35: CPT | Performed by: HOSPITALIST

## 2017-01-15 PROCEDURE — 84165 PROTEIN E-PHORESIS SERUM: CPT

## 2017-01-15 PROCEDURE — 83735 ASSAY OF MAGNESIUM: CPT

## 2017-01-15 PROCEDURE — 85007 BL SMEAR W/DIFF WBC COUNT: CPT

## 2017-01-15 PROCEDURE — 700105 HCHG RX REV CODE 258: Performed by: HOSPITALIST

## 2017-01-15 PROCEDURE — A9270 NON-COVERED ITEM OR SERVICE: HCPCS | Performed by: INTERNAL MEDICINE

## 2017-01-15 PROCEDURE — 770009 HCHG ROOM/CARE - ONCOLOGY SEMI PRI*

## 2017-01-15 PROCEDURE — 85027 COMPLETE CBC AUTOMATED: CPT

## 2017-01-15 PROCEDURE — A9270 NON-COVERED ITEM OR SERVICE: HCPCS | Performed by: HOSPITALIST

## 2017-01-15 PROCEDURE — 80053 COMPREHEN METABOLIC PANEL: CPT

## 2017-01-15 PROCEDURE — 84100 ASSAY OF PHOSPHORUS: CPT

## 2017-01-15 PROCEDURE — 84160 ASSAY OF PROTEIN ANY SOURCE: CPT

## 2017-01-15 PROCEDURE — 700105 HCHG RX REV CODE 258: Performed by: INTERNAL MEDICINE

## 2017-01-15 PROCEDURE — 700102 HCHG RX REV CODE 250 W/ 637 OVERRIDE(OP): Performed by: HOSPITALIST

## 2017-01-15 RX ORDER — ECHINACEA PURPUREA EXTRACT 125 MG
2 TABLET ORAL PRN
Status: DISCONTINUED | OUTPATIENT
Start: 2017-01-15 | End: 2017-01-18 | Stop reason: HOSPADM

## 2017-01-15 RX ORDER — SODIUM CHLORIDE 9 MG/ML
INJECTION, SOLUTION INTRAVENOUS CONTINUOUS
Status: DISCONTINUED | OUTPATIENT
Start: 2017-01-15 | End: 2017-01-18 | Stop reason: HOSPADM

## 2017-01-15 RX ORDER — ALPRAZOLAM 0.25 MG/1
0.25 TABLET ORAL NIGHTLY PRN
Status: DISCONTINUED | OUTPATIENT
Start: 2017-01-15 | End: 2017-01-18 | Stop reason: HOSPADM

## 2017-01-15 RX ORDER — LORAZEPAM 2 MG/ML
0.5 INJECTION INTRAMUSCULAR EVERY 4 HOURS PRN
Status: DISCONTINUED | OUTPATIENT
Start: 2017-01-15 | End: 2017-01-18 | Stop reason: HOSPADM

## 2017-01-15 RX ADMIN — PIPERACILLIN AND TAZOBACTAM 4.5 G: 4; .5 INJECTION, POWDER, LYOPHILIZED, FOR SOLUTION INTRAVENOUS; PARENTERAL at 17:23

## 2017-01-15 RX ADMIN — Medication 2 SPRAY: at 14:15

## 2017-01-15 RX ADMIN — FAMOTIDINE 20 MG: 10 INJECTION, SOLUTION INTRAVENOUS at 22:37

## 2017-01-15 RX ADMIN — ONDANSETRON 4 MG: 2 INJECTION, SOLUTION INTRAMUSCULAR; INTRAVENOUS at 21:08

## 2017-01-15 RX ADMIN — VANCOMYCIN HYDROCHLORIDE 1300 MG: 10 INJECTION, POWDER, LYOPHILIZED, FOR SOLUTION INTRAVENOUS at 22:14

## 2017-01-15 RX ADMIN — PIPERACILLIN AND TAZOBACTAM 4.5 G: 4; .5 INJECTION, POWDER, LYOPHILIZED, FOR SOLUTION INTRAVENOUS; PARENTERAL at 06:12

## 2017-01-15 RX ADMIN — TRAMADOL HYDROCHLORIDE 50 MG: 50 TABLET, COATED ORAL at 21:08

## 2017-01-15 RX ADMIN — OXYCODONE HYDROCHLORIDE 5 MG: 5 TABLET ORAL at 17:07

## 2017-01-15 RX ADMIN — ONDANSETRON 4 MG: 4 TABLET, ORALLY DISINTEGRATING ORAL at 07:39

## 2017-01-15 RX ADMIN — SODIUM CHLORIDE: 9 INJECTION, SOLUTION INTRAVENOUS at 07:50

## 2017-01-15 RX ADMIN — ACETAMINOPHEN 500 MG: 500 TABLET, FILM COATED ORAL at 23:10

## 2017-01-15 RX ADMIN — ONDANSETRON 4 MG: 2 INJECTION, SOLUTION INTRAMUSCULAR; INTRAVENOUS at 12:21

## 2017-01-15 RX ADMIN — OXYCODONE HYDROCHLORIDE 5 MG: 5 TABLET ORAL at 11:54

## 2017-01-15 RX ADMIN — ACETAMINOPHEN 500 MG: 500 TABLET, FILM COATED ORAL at 04:08

## 2017-01-15 RX ADMIN — ONDANSETRON 4 MG: 2 INJECTION, SOLUTION INTRAMUSCULAR; INTRAVENOUS at 17:07

## 2017-01-15 RX ADMIN — LIDOCAINE HYDROCHLORIDE 15 ML: 20 SOLUTION ORAL; TOPICAL at 14:16

## 2017-01-15 RX ADMIN — VANCOMYCIN HYDROCHLORIDE 1300 MG: 10 INJECTION, POWDER, LYOPHILIZED, FOR SOLUTION INTRAVENOUS at 01:00

## 2017-01-15 RX ADMIN — VANCOMYCIN HYDROCHLORIDE 1300 MG: 10 INJECTION, POWDER, LYOPHILIZED, FOR SOLUTION INTRAVENOUS at 08:03

## 2017-01-15 RX ADMIN — PIPERACILLIN AND TAZOBACTAM 4.5 G: 4; .5 INJECTION, POWDER, LYOPHILIZED, FOR SOLUTION INTRAVENOUS; PARENTERAL at 14:12

## 2017-01-15 RX ADMIN — ONDANSETRON 4 MG: 2 INJECTION, SOLUTION INTRAMUSCULAR; INTRAVENOUS at 02:15

## 2017-01-15 RX ADMIN — ACETAMINOPHEN 500 MG: 500 TABLET, FILM COATED ORAL at 15:47

## 2017-01-15 RX ADMIN — TRAMADOL HYDROCHLORIDE 50 MG: 50 TABLET, COATED ORAL at 07:48

## 2017-01-15 ASSESSMENT — PAIN SCALES - GENERAL
PAINLEVEL_OUTOF10: 6
PAINLEVEL_OUTOF10: 7
PAINLEVEL_OUTOF10: 6
PAINLEVEL_OUTOF10: 4
PAINLEVEL_OUTOF10: 5
PAINLEVEL_OUTOF10: 6
PAINLEVEL_OUTOF10: 8
PAINLEVEL_OUTOF10: 7

## 2017-01-15 ASSESSMENT — ENCOUNTER SYMPTOMS
COUGH: 0
NAUSEA: 0
CHILLS: 0
BLURRED VISION: 0
DIZZINESS: 0
MYALGIAS: 0
HEADACHES: 0
FEVER: 0
PALPITATIONS: 0
VOMITING: 0
ABDOMINAL PAIN: 0
SHORTNESS OF BREATH: 0

## 2017-01-15 ASSESSMENT — LIFESTYLE VARIABLES
TOTAL SCORE: 0
CONSUMPTION TOTAL: NEGATIVE
ALCOHOL_USE: YES
TOTAL SCORE: 0
AVERAGE NUMBER OF DAYS PER WEEK YOU HAVE A DRINK CONTAINING ALCOHOL: 0
ON A TYPICAL DAY WHEN YOU DRINK ALCOHOL HOW MANY DRINKS DO YOU HAVE: 0
HAVE YOU EVER FELT YOU SHOULD CUT DOWN ON YOUR DRINKING: NO
TOTAL SCORE: 0
EVER FELT BAD OR GUILTY ABOUT YOUR DRINKING: NO
HOW MANY TIMES IN THE PAST YEAR HAVE YOU HAD 5 OR MORE DRINKS IN A DAY: 0
EVER HAD A DRINK FIRST THING IN THE MORNING TO STEADY YOUR NERVES TO GET RID OF A HANGOVER: NO
HAVE PEOPLE ANNOYED YOU BY CRITICIZING YOUR DRINKING: NO
EVER_SMOKED: YES
EVER_SMOKED: YES

## 2017-01-15 ASSESSMENT — COPD QUESTIONNAIRES
HAVE YOU SMOKED AT LEAST 100 CIGARETTES IN YOUR ENTIRE LIFE: YES
DURING THE PAST 4 WEEKS HOW MUCH DID YOU FEEL SHORT OF BREATH: NONE/LITTLE OF THE TIME
COPD SCREENING SCORE: 2
DO YOU EVER COUGH UP ANY MUCUS OR PHLEGM?: NO/ONLY WITH OCCASIONAL COLDS OR INFECTIONS

## 2017-01-15 NOTE — H&P
PRIMARY CARE PHYSICIAN:  Dr. Giuseppe Bravo.    CHIEF COMPLAINT:  Nausea, vomiting, and fever.    HISTORY OF PRESENTING ILLNESS:  This is a pleasant 26-year-old female who   presents to the emergency room for further evaluation of nausea, vomiting and   fever.  To note this patient underwent laparoscopic cholecystectomy with Dr. Pedro from surgery last month.  She now presents to the emergency room with   complaints of nausea, vomiting and fever.  She reports that she was febrile   at home for the last 3-4 days.  She reported that she measured her temperature   at home and this was 106.9 degree Fahrenheit last night.  When questioned why   did not she come in with into the emergency room with that temperature, she   remained uncertain to the answer.  She further informs me that she has   abdominal pain.  This is right upper quadrant in location, persistent since   last 3 days, achy and sharp in character, 6/10 in intensity, radiating towards   the left side of her belly, has not notice any improving or worsening factors   with the pain.  Denies any associated diarrhea.  Her last bowel movement was   2 days ago when she reports this to be normal; otherwise it has been   associated nausea and vomiting.  She report that she has vomited numerous time   and is unable to remember the amount or frequency of vomiting.  She was done   given they had been so numerous.  Otherwise, she denies any hemetemesis.  She   denies any sick contact.  She complains of slight headache.  Denies any chest   pain, shortness of breath or cough.  She complains of associated weakness,   lethargy, malaise, myalgias and arthralgias.  She denies any dysuria,   frequency, urgency or hematuria.  She denies any lower extremity swelling,   palpitations or orthopnea.    HOME MEDICATIONS:  1.  Nexplanon implant.  2.  Docusate sodium.  3.  [place].    PAST MEDICAL HISTORY:  1.  Anemia.  2.  Neutropenia.    PAST SURGICAL HISTORY:   Cholecystectomy.    FAMILY HISTORY:  Mother with history of neutropenia.  Grandmother with history   of lung cancer.  Grandfather with history of diabetes mellitus.    SOCIAL HISTORY:  Denies smoking, use of alcohol or drug of abuse.  Currently   living with her boyfriend and two kids.  Working at a [employer].    ALLERGIES:  No known allergies.    REVIEW OF SYSTEMS:  A detailed review of systems was reviewed with the patient   and negative other than as listed in the history of presenting illness and   past medical history.    PHYSICAL EXAMINATION:   VITAL SIGNS:  On presentation temperature of 36.1 degrees Celsius, pulse of   118, respiratory rate of 16, blood pressure of 100/66, weight of 62.6 kg, and   oxygen saturation of 96% on room air.  GENERAL:  Alert and oriented x3, in no acute distress.  HEAD, EYES, AND ENT:  Head is normocephalic.  Extraocular movements are   intact.  Pupils are equal, round, and reactive to light and accommodation.  No   conjunctival pallor.  No scleral icterus.  No nasal discharge.  No pharyngeal   exudates or erythema.  NECK:  No palpable lymphadenopathy.  No jugular venous distention.  CARDIOVASCULAR:  Regular rate and rhythm.  Patient has S1 plus S2.  No   murmurs, rubs or gallops.  She is noted to be tachycardiac.  RESPIRATIONS:  Clear to auscultation bilaterally.  No wheezing or rhonchi.  ABDOMEN:  Soft, tender to palpation in the right upper quadrant.  She is noted   to have the right sided cholecystectomy and the middle upper incision   consistent with postoperative cellulitis of the incision site; otherwise bowel   sounds are normal in frequency.  GENITOURINARY:  System was not examined.  MUSCULOSKELETAL:  No joint swelling or tenderness on examination.  SKIN:  No rashes or erythema or wound.  NEUROLOGICAL:  Cranial nerves without any gross deficit.  Motor strength is   5/5 in bilateral upper and lower extremities.  No gross sensory deficits.  EXTREMITIES:  Pulses 2+ in  bilateral lower extremities.  No edema, no   cyanosis.    LABORATORY STUDIES:  White blood cell count of 1.5, hemoglobin of 10.6,   platelet count of 376.  Sodium of 130, potassium of 2.2, BUN of 10, creatinine   of 0.52.  AST of 19, ALT of 48, alkaline phosphatase of 127, total protein of   9.2, globulin of 4.6, lipase of 6, lactic acid of 1.2.  INR of 1.06.    Urinalysis negative for any evidence of urinary tract infection but WBCs are   seen.  Cortisol is 15.7.  Pregnancy screening is negative.  Influenza   screening is negative.    IMAGING STUDIES:  CT of the chest reveals no evidence of pulmonary embolism,   dilation of main pulmonary arteries noted.  CT of the abdomen and pelvis is   negative for any acute concerns.  HIDA scan was obtained given concern for the   biliary leak and this is negative.    ASSESSMENT AND PLAN:  1.  Sepsis.  The patient presents with sepsis given findings with SIRS   criteria with underlying source of postoperative incision site cellulitis.    Given her underlying neutropenia, she has been initiated on broad spectrum   antibiotics including the use of intravenous vancomycin and Zosyn therapy.    The patient received sepsis protocol IV fluids in the emergency room.  The   patient will be admitted with sepsis protocol with monitoring of her lactic   acid levels.  Given the critical nature of her illness with systolic blood   pressure less than 90, she will be admitted to the intensive care unit for   ongoing monitoring on sepsis protocol in response to early therapy.  2.  Febrile neutropenia.  At this point, the patient has been initiated on   broad spectrum antibiotics and recommended infectious disease consultation in   the morning.  The patient has been previously seen by Dr. Quijano.    Recommend consultation from Dr. Quijano and consideration towards obtaining   a bone marrow biopsy during the hospital stay.  3.  Post cholecystectomy site incision cellulitis.  Concern for  underlying   MRSA with given underlying induration.  Continue vancomycin and Zosyn therapy.    Deescalate as appropriate.  Surgical service has been notified of inpatient   status by the emergency room physician.  4.  Hyponatremia, sepsis / dehydration related.  Recommend ongoing   monitoring with IV fluid hydration.  5.  Hypokalemia.  This has been replaced by me.  6.  Hyperproteinemia.  Recommend obtaining a serum protein electrophoresis,   this has been ordered.  Recommend further discussion of this with hematology.  7.  Concern for pulmonary hypertension on the CAT scan.  Recommend   echocardiogram which has been ordered.  8.  Preventive prophylaxis.  Deep venous thrombosis preventive prophylaxis   with subcutaneous enoxaparin and sequential compression devices has been   ordered.  Stress ulcer prophylaxes are not indicated.  9.  Code status.  The patient has been admitted to the hospital under full   code status.    This patient has critical/life threatening illness requiring my direct   presence involvement and maximal preparedness.  I personally spent 36 minutes   providing critical care to this patient.  Time spent on critical care excludes   time spent on procedures if any.       ____________________________________     MD YANETH PEREZ / ADRIAN    DD:  01/14/2017 21:35:17  DT:  01/14/2017 22:38:53    D#:  [id number]  Job#:  [id number]

## 2017-01-15 NOTE — PROGRESS NOTES
"Pharmacy Kinetics 26 y.o. female on vancomycin day # 2 1/15/2017    Currently on Vancomycin 1300 mg iv q8hr    Indication for Treatment: Neutropenic fever    Pertinent history per medical record: Admitted on 2017 for nausea, vomiting and fevers. With concurrent neutropenia also observevd, empiric antibiotic therapy initiated.    Other antibiotics: Piperacillin/tazobactam 4.5 g IV q6h    Allergies: Review of patient's allergies indicates no known allergies.     List concerns for renal function: CT with contrast 17    Pertinent cultures to date:     17 Nasopharyngeal      NGTD  17 PBC x 2       NGTD    Recent Labs      17   0940  01/15/17   0430   WBC  1.5*  2.8*   NEUTSPOLYS  17.40*  3.50*   BANDSSTABS  0.90  4.40     Recent Labs      17   0940  01/15/17   0430   BUN  10  3*   CREATININE  0.52  0.50   ALBUMIN  4.6  4.0       Intake/Output Summary (Last 24 hours) at 01/15/17 1427  Last data filed at 01/15/17 0800   Gross per 24 hour   Intake   2560 ml   Output   1775 ml   Net    785 ml      Blood pressure 100/66, pulse 93, temperature 38.2 °C (100.8 °F), temperature source Temporal, resp. rate 16, height 1.499 m (4' 11.02\"), weight 62.8 kg (138 lb 7.2 oz), last menstrual period 2016, SpO2 99 %, not currently breastfeeding. Temp (24hrs), Av.6 °C (99.6 °F), Min:36.4 °C (97.6 °F), Max:38.9 °C (102 °F)      A/P   1. Vancomycin dose change: Continue 1300 mg IV q8h scheduled dosing  2. Next vancomycin level: Trough tomorrow at 0730  3. Goal trough: 16-20 mcg/mL  4. Comments: Therapy with vancomycin continues empirically for neutropenic fever. With no major concerns for accumulation, scheduled dosing started yesterday. Renal indices stable, cultures negative to date. Will continue current scheduled dosing and follow up on trough level tomorrow to ensure appropriate clearance and drug levels. Pharmacy will continue to follow.     Cem LoweryD, BCPS      "

## 2017-01-15 NOTE — CARE PLAN
Problem: Safety  Goal: Will remain free from injury  Outcome: PROGRESSING AS EXPECTED  Mobility gait assessed and documented, bed in lowest position, and call light within reach.     Problem: Knowledge Deficit  Goal: Knowledge of disease process/condition, treatment plan, diagnostic tests, and medications will improve  Outcome: PROGRESSING AS EXPECTED  Plan of care discussed with patient, and why patient was brought to the ICU, all questions answered at this time.

## 2017-01-15 NOTE — PROGRESS NOTES
Hospital Medicine ICU Interval Note    Date of Service: 1/15/2017    Chief Complaint  26 y.o.-year-old female admitted 2017 with sepsis, recent lap jose a last month    Interval Problem Update  On day 2 of antibiotics, wbc and fevers improving.  Anxious.  CT abdomen unremarkable.  BP fine.  Can transfer to floor.    Consultants/Specialty  None    Disposition  To surgical floor        Review of Systems   Constitutional: Negative for fever and chills.   Eyes: Negative for blurred vision.   Respiratory: Negative for cough and shortness of breath.    Cardiovascular: Negative for chest pain and palpitations.   Gastrointestinal: Negative for nausea, vomiting and abdominal pain.   Genitourinary: Negative for dysuria.   Musculoskeletal: Negative for myalgias.   Neurological: Negative for dizziness and headaches.      Physical Exam  Laboratory/Imaging   Hemodynamics  Temp (24hrs), Av.4 °C (99.4 °F), Min:36.4 °C (97.6 °F), Max:38.9 °C (102 °F)   Temperature: 36.4 °C (97.6 °F)  Pulse  Av.2  Min: 75  Max: 128 Heart Rate (Monitored): 87  NIBP: (!) 93/55 mmHg      Respiratory      Respiration: 13, Pulse Oximetry: 98 %        RUL Breath Sounds: Clear, RML Breath Sounds: Diminished, RLL Breath Sounds: Diminished, RADHA Breath Sounds: Clear, LLL Breath Sounds: Diminished    Fluids  Date 01/15/17 0700 - 17 0659   Shift 4330-1433 1774-0111 1121-2353 24 Hour Total   I  N  T  A  K  E   P.O. 30   30    I.V. 450   450    Shift Total 480   480   O  U  T  P  U  T   Shift Total       Weight (kg) 62.8 62.8 62.8 62.8         Nutrition  Orders Placed This Encounter   Procedures   • DIET ORDER     Standing Status: Standing      Number of Occurrences: 1      Standing Expiration Date:      Order Specific Question:  Diet:     Answer:  Clear Liquid [10]     Physical Exam   Constitutional: She is oriented to person, place, and time.   Cardiovascular: Normal rate, regular rhythm and normal heart sounds.    No murmur  heard.  Pulmonary/Chest: Effort normal and breath sounds normal. No respiratory distress. She has no wheezes. She has no rales.   Abdominal: Soft. Bowel sounds are normal. She exhibits no distension. There is tenderness (to palpation).   Stab sites intact, though there is mild cellulitis around one   Musculoskeletal: Normal range of motion. She exhibits no edema.   Neurological: She is alert and oriented to person, place, and time.   Skin: Skin is warm and dry. No erythema.   Nursing note and vitals reviewed.      Recent Labs      01/14/17   0940  01/15/17   0430   WBC  1.5*  2.8*   RBC  4.26  4.34   HEMOGLOBIN  10.6*  10.3*   HEMATOCRIT  32.9*  34.6*   MCV  77.2*  79.7*   MCH  24.9*  23.7*   MCHC  32.2*  29.8*   RDW  47.9  49.5   PLATELETCT  376  356   MPV  8.9*  9.1     Recent Labs      01/14/17   0940  01/15/17   0430   SODIUM  130*  134*   POTASSIUM  3.2*  4.1   CHLORIDE  97  102   CO2  21  23   GLUCOSE  119*  122*   BUN  10  3*   CREATININE  0.52  0.50   CALCIUM  9.7  9.2     Recent Labs      01/14/17   0940   APTT  32.5   INR  1.06                  Assessment/Plan     Sepsis (CMS-HCC)  Assessment & Plan  -improved, bp fine with fluids  -continue abx, cx so far negative (blood, urine, flu, ua neg)  -lactate normal    Neutropenic fever (CMS-HCC)  Assessment & Plan  -subsiding, neutropenia improving  -looks as though she's had this before, and in the family  -?consult oncology tomorrow    Abdominal pain  Assessment & Plan  -oixycodone prn    Elevated serum globulin level  Assessment & Plan  -SPEP ordered    Cellulitis, wound, post-operative  Assessment & Plan  -Dr. Pedro informed by ERP patient is here  -continue IV abx vanco/zosyn      Labs reviewed, Medications reviewed and Radiology images reviewed

## 2017-01-15 NOTE — PROGRESS NOTES
Receive pt from ED via [employer]. Pt was able to stand and walk to ICU bed independently. Pt states she feels cold. Warm blankets applied. Maintenance fluid running per active order. Water and kleenex provided per pt request. Pt oriented to room, including call light. Understands to call for assistance. Reddened abdominal lap sites noted. Pt states they are very tender. Pt states the blood pressure cuff hurts her arm. BP set to run every 30 minutes. Teaching provided of importance and leg was offered as alternative if she cannot tolerate. Pt states IV running fluids is slightly painful as well. Teaching provided regarding potassium solutions to be irritating to vein. IV flushes. Bed locked and in low position.

## 2017-01-16 LAB
ALBUMIN SERPL BCP-MCNC: 3.6 G/DL (ref 3.2–4.9)
ALBUMIN/GLOB SERPL: 0.8 G/DL
ALP SERPL-CCNC: 86 U/L (ref 30–99)
ALT SERPL-CCNC: 28 U/L (ref 2–50)
ANION GAP SERPL CALC-SCNC: 10 MMOL/L (ref 0–11.9)
ANISOCYTOSIS BLD QL SMEAR: ABNORMAL
AST SERPL-CCNC: 14 U/L (ref 12–45)
BACTERIA UR CULT: NORMAL
BASOPHILS # BLD AUTO: 0.9 % (ref 0–1.8)
BASOPHILS # BLD: 0.03 K/UL (ref 0–0.12)
BILIRUB SERPL-MCNC: 0.7 MG/DL (ref 0.1–1.5)
BUN SERPL-MCNC: 4 MG/DL (ref 8–22)
CALCIUM SERPL-MCNC: 9.3 MG/DL (ref 8.5–10.5)
CHLORIDE SERPL-SCNC: 105 MMOL/L (ref 96–112)
CO2 SERPL-SCNC: 25 MMOL/L (ref 20–33)
CREAT SERPL-MCNC: 0.53 MG/DL (ref 0.5–1.4)
EOSINOPHIL # BLD AUTO: 1.07 K/UL (ref 0–0.51)
EOSINOPHIL NFR BLD: 32.5 % (ref 0–6.9)
ERYTHROCYTE [DISTWIDTH] IN BLOOD BY AUTOMATED COUNT: 49.4 FL (ref 35.9–50)
GFR SERPL CREATININE-BSD FRML MDRD: >60 ML/MIN/1.73 M 2
GLOBULIN SER CALC-MCNC: 4.4 G/DL (ref 1.9–3.5)
GLUCOSE SERPL-MCNC: 80 MG/DL (ref 65–99)
HCT VFR BLD AUTO: 29.4 % (ref 37–47)
HGB BLD-MCNC: 9.1 G/DL (ref 12–16)
LYMPHOCYTES # BLD AUTO: 1.36 K/UL (ref 1–4.8)
LYMPHOCYTES NFR BLD: 41.2 % (ref 22–41)
MANUAL DIFF BLD: NORMAL
MCH RBC QN AUTO: 24.9 PG (ref 27–33)
MCHC RBC AUTO-ENTMCNC: 31 G/DL (ref 33.6–35)
MCV RBC AUTO: 80.3 FL (ref 81.4–97.8)
METAMYELOCYTES NFR BLD MANUAL: 0.9 %
MICROCYTES BLD QL SMEAR: ABNORMAL
MONOCYTES # BLD AUTO: 0.49 K/UL (ref 0–0.85)
MONOCYTES NFR BLD AUTO: 14.9 % (ref 0–13.4)
MORPHOLOGY BLD-IMP: NORMAL
NEUTROPHILS # BLD AUTO: 0.32 K/UL (ref 2–7.15)
NEUTROPHILS NFR BLD: 9.6 % (ref 44–72)
NRBC # BLD AUTO: 0 K/UL
NRBC BLD AUTO-RTO: 0 /100 WBC
PLATELET # BLD AUTO: 352 K/UL (ref 164–446)
PLATELET BLD QL SMEAR: NORMAL
PMV BLD AUTO: 8.8 FL (ref 9–12.9)
POTASSIUM SERPL-SCNC: 3.8 MMOL/L (ref 3.6–5.5)
PROT SERPL-MCNC: 8 G/DL (ref 6–8.2)
RBC # BLD AUTO: 3.66 M/UL (ref 4.2–5.4)
RBC BLD AUTO: PRESENT
SIGNIFICANT IND 70042: NORMAL
SITE SITE: NORMAL
SODIUM SERPL-SCNC: 140 MMOL/L (ref 135–145)
SOURCE SOURCE: NORMAL
TOXIC GRANULES BLD QL SMEAR: SLIGHT
VANCOMYCIN TROUGH SERPL-MCNC: 13.2 UG/ML (ref 10–20)
WBC # BLD AUTO: 3.3 K/UL (ref 4.8–10.8)

## 2017-01-16 PROCEDURE — 700111 HCHG RX REV CODE 636 W/ 250 OVERRIDE (IP): Performed by: INTERNAL MEDICINE

## 2017-01-16 PROCEDURE — 85027 COMPLETE CBC AUTOMATED: CPT

## 2017-01-16 PROCEDURE — 99233 SBSQ HOSP IP/OBS HIGH 50: CPT | Performed by: HOSPITALIST

## 2017-01-16 PROCEDURE — 80202 ASSAY OF VANCOMYCIN: CPT

## 2017-01-16 PROCEDURE — 700111 HCHG RX REV CODE 636 W/ 250 OVERRIDE (IP)

## 2017-01-16 PROCEDURE — 700105 HCHG RX REV CODE 258: Performed by: INTERNAL MEDICINE

## 2017-01-16 PROCEDURE — 700102 HCHG RX REV CODE 250 W/ 637 OVERRIDE(OP): Performed by: INTERNAL MEDICINE

## 2017-01-16 PROCEDURE — 80053 COMPREHEN METABOLIC PANEL: CPT

## 2017-01-16 PROCEDURE — 700105 HCHG RX REV CODE 258: Performed by: HOSPITALIST

## 2017-01-16 PROCEDURE — 36415 COLL VENOUS BLD VENIPUNCTURE: CPT

## 2017-01-16 PROCEDURE — A9270 NON-COVERED ITEM OR SERVICE: HCPCS | Performed by: INTERNAL MEDICINE

## 2017-01-16 PROCEDURE — 770009 HCHG ROOM/CARE - ONCOLOGY SEMI PRI*

## 2017-01-16 PROCEDURE — 85007 BL SMEAR W/DIFF WBC COUNT: CPT

## 2017-01-16 RX ADMIN — ACETAMINOPHEN 500 MG: 500 TABLET, FILM COATED ORAL at 07:39

## 2017-01-16 RX ADMIN — ONDANSETRON 4 MG: 2 INJECTION, SOLUTION INTRAMUSCULAR; INTRAVENOUS at 17:30

## 2017-01-16 RX ADMIN — ONDANSETRON 4 MG: 2 INJECTION, SOLUTION INTRAMUSCULAR; INTRAVENOUS at 01:21

## 2017-01-16 RX ADMIN — PIPERACILLIN AND TAZOBACTAM 4.5 G: 4; .5 INJECTION, POWDER, LYOPHILIZED, FOR SOLUTION INTRAVENOUS; PARENTERAL at 17:30

## 2017-01-16 RX ADMIN — PIPERACILLIN AND TAZOBACTAM 4.5 G: 4; .5 INJECTION, POWDER, LYOPHILIZED, FOR SOLUTION INTRAVENOUS; PARENTERAL at 01:17

## 2017-01-16 RX ADMIN — SODIUM CHLORIDE: 9 INJECTION, SOLUTION INTRAVENOUS at 06:18

## 2017-01-16 RX ADMIN — VANCOMYCIN HYDROCHLORIDE 1300 MG: 10 INJECTION, POWDER, LYOPHILIZED, FOR SOLUTION INTRAVENOUS at 07:35

## 2017-01-16 RX ADMIN — FAMOTIDINE 20 MG: 10 INJECTION, SOLUTION INTRAVENOUS at 07:30

## 2017-01-16 RX ADMIN — SODIUM CHLORIDE: 9 INJECTION, SOLUTION INTRAVENOUS at 22:00

## 2017-01-16 RX ADMIN — ACETAMINOPHEN 500 MG: 500 TABLET, FILM COATED ORAL at 17:30

## 2017-01-16 RX ADMIN — TRAMADOL HYDROCHLORIDE 50 MG: 50 TABLET, COATED ORAL at 07:34

## 2017-01-16 RX ADMIN — PIPERACILLIN AND TAZOBACTAM 4.5 G: 4; .5 INJECTION, POWDER, LYOPHILIZED, FOR SOLUTION INTRAVENOUS; PARENTERAL at 23:15

## 2017-01-16 RX ADMIN — VANCOMYCIN HYDROCHLORIDE 1300 MG: 10 INJECTION, POWDER, LYOPHILIZED, FOR SOLUTION INTRAVENOUS at 15:31

## 2017-01-16 RX ADMIN — ACETAMINOPHEN 500 MG: 500 TABLET, FILM COATED ORAL at 12:29

## 2017-01-16 RX ADMIN — ONDANSETRON 4 MG: 2 INJECTION, SOLUTION INTRAMUSCULAR; INTRAVENOUS at 21:33

## 2017-01-16 RX ADMIN — PIPERACILLIN AND TAZOBACTAM 4.5 G: 4; .5 INJECTION, POWDER, LYOPHILIZED, FOR SOLUTION INTRAVENOUS; PARENTERAL at 06:16

## 2017-01-16 RX ADMIN — ONDANSETRON 4 MG: 4 TABLET, ORALLY DISINTEGRATING ORAL at 15:20

## 2017-01-16 RX ADMIN — ONDANSETRON 4 MG: 2 INJECTION, SOLUTION INTRAMUSCULAR; INTRAVENOUS at 11:47

## 2017-01-16 RX ADMIN — FAMOTIDINE 20 MG: 10 INJECTION, SOLUTION INTRAVENOUS at 21:33

## 2017-01-16 RX ADMIN — ONDANSETRON 4 MG: 2 INJECTION, SOLUTION INTRAMUSCULAR; INTRAVENOUS at 07:29

## 2017-01-16 RX ADMIN — PIPERACILLIN AND TAZOBACTAM 4.5 G: 4; .5 INJECTION, POWDER, LYOPHILIZED, FOR SOLUTION INTRAVENOUS; PARENTERAL at 11:47

## 2017-01-16 ASSESSMENT — ENCOUNTER SYMPTOMS
PALPITATIONS: 0
MYALGIAS: 0
NAUSEA: 0
HEADACHES: 0
FEVER: 0
CHILLS: 0
DIZZINESS: 0
BLURRED VISION: 0
SHORTNESS OF BREATH: 0
COUGH: 0
VOMITING: 0
ABDOMINAL PAIN: 0

## 2017-01-16 ASSESSMENT — PAIN SCALES - GENERAL
PAINLEVEL_OUTOF10: 2
PAINLEVEL_OUTOF10: 9
PAINLEVEL_OUTOF10: 5
PAINLEVEL_OUTOF10: 9

## 2017-01-16 NOTE — CARE PLAN
Problem: Infection  Goal: Will remain free from infection  ABX therapy given per MAR. Teaching provided to include incision care and refraining from touching reddened sites.     Problem: Fluid Volume:  Goal: Will maintain balanced intake and output  Limited oral intake. zofran given for nausea. Pt states she has no appetite.

## 2017-01-16 NOTE — PROGRESS NOTES
"Pharmacy Kinetics 26 y.o. female on vancomycin day # 3 2017    Currently on Vancomycin 1300 mg iv q8hr    Indication for Treatment: Neutropenic fever    Pertinent history per medical record: Admitted on 2017 for nausea, vomiting and fevers. With concurrent neutropenia also observevd, empiric antibiotic therapy initiated.    Other antibiotics: Piperacillin/tazobactam 4.5g IV Q6h    Allergies: Review of patient's allergies indicates no known allergies.     List concerns for renal function: CT w/contrast 17    Pertinent cultures to date:   17 - respiratory (nasopharyngeal): Negative for influenza  17 - blood (peripheral) x 2: NGTD  17 - urine: NGTD    Recent Labs      17   0940  01/15/17   0430  17   0732   WBC  1.5*  2.8*  3.3*   NEUTSPOLYS  17.40*  3.50*  9.60*   BANDSSTABS  0.90  4.40   --      Recent Labs      17   0940  01/15/17   0430  17   0732   BUN  10  3*  4*   CREATININE  0.52  0.50  0.53   ALBUMIN  4.6  4.0  3.6     Recent Labs      17   0732   VANCOTROUGH  13.2     Intake/Output Summary (Last 24 hours) at 17 1207  Last data filed at 17 0600   Gross per 24 hour   Intake   1536 ml   Output   1600 ml   Net    -64 ml      Blood pressure 101/59, pulse 81, temperature 36.8 °C (98.2 °F), temperature source Temporal, resp. rate 16, height 1.499 m (4' 11.02\"), weight 62.8 kg (138 lb 7.2 oz), last menstrual period 2016, SpO2 94 %, not currently breastfeeding. Temp (24hrs), Av.8 °C (98.2 °F), Min:36.7 °C (98 °F), Max:36.9 °C (98.4 °F)    A/P   1. Vancomycin dose change: Continue vancomycin 1300 mg IV Q8h (~20 mg/kg; due @ 0745, 1545, 2345)  2. Next vancomycin level:  @ 1515  3. Goal trough: 16-20 mcg/mL  4. A/P: Patient afebrile overnight; vital signs appear stable - ? low BP at baseline. Remains neutropenic. Renal indices stable, maintaining good UOP ~2.12 mL/kg/hr. Preliminary cx negative thus far. Steady state level resulted " subtherapeutic, however ~14 hours elapsed between 2nd and 3rd maintenance dose. There are no significant concerns for accumulation/clearance from a renal standpoint. Will continue with vancomycin ~20 mg/kg IV Q8h as outlined above and check another level tomorrow @ 1075. Pharmacy to monitor and adjust as needed.     Melody Alcantara, PharmD

## 2017-01-16 NOTE — CARE PLAN
Problem: Nutritional:  Goal: Achieve adequate nutritional intake  Patient will consume >50% of meals  Outcome: NOT MET

## 2017-01-16 NOTE — PROGRESS NOTES
Pt no longer having cough; stated it was just morning phlegm. Pt only c/o mild headache now after Tylenol.

## 2017-01-16 NOTE — DIETARY
Nutrition Services: Poor PO on Nutrition Admit Screen   26 year old female with admit dx of sepsis  Past Pertinent Med Hx: anemia, neutropenia    Attempted to visit pt twice, pt was sleeping. Pt previously on CLD x 2 days and per chart pt PO sips/refused. No PO recorded yet on regular diet. RD will add snacks.     Ht: 149.9 cm   Wt 1/15: 62.8 kg via stand up scale  BMI: 27.95  Diet: Regular  Pertinent Labs: BUN 4  Pertinent Meds: lovenox, pepcid, zosyn, vancomycin, zofran (prn), ultram (prn)  Fluids: NS infusion @ 100 ml/hr  Skin: surgical incision abdomen, wound POA puncture right lateral abdomen  GI: Last BM 1/15    PLAN/RECOMMEND -  1) Nutrition rep to see patient daily for meal and snack preferences  2) Encourage PO  3) Weekly weights to monitor fluid and nutrition status  4) Obtain supplement order per RD as needed.    RD following

## 2017-01-16 NOTE — PROGRESS NOTES
Pt arrived on unit via transport.  Pt ambulated to restroom and to bed, steady gait, up ad demario.  AAOx4, VSS, c/o 6/10 upper abd pain pt given oxycodone for pain, pt w/ c/o nausea, IV zofran administered per MAR.  Pt's skin assessed 4 abd lap-stabs LUQ incision red swollen and painful, RLQ incision red swollen and painful, MD aware abx on board, otherwise CDI.  Pt educated re: increased fall risk, use of call light, hourly rounding, and pain scale; pt verbalizes her understanding.  Personal possessions and call light w/n reach, bed in lowest position.  Bed alarm off, pt up self, calls appropriately.

## 2017-01-16 NOTE — ASSESSMENT & PLAN NOTE
Afebrile  Neutropenic at baseline, follows with Dr Julio as outpatient  D/w Dr Julio, recommended bone marrow biopsy which patient has refused  Okay for neupogen though not ideal given no bone marrow - dose ordered.  Patient requesting to be given IV and not SQ - this formulation not available - SQ ordered

## 2017-01-16 NOTE — PROGRESS NOTES
zofran given for nausea PRN. Pt wants to remain on clear liquid diet at this time. Oral intake minimal.

## 2017-01-16 NOTE — PROGRESS NOTES
Anamika from Lab called with critical result of ANC at 0910. Critical lab result read back to Anamika.   This critical lab result is within parameters established by Dr. Batista for this patient

## 2017-01-16 NOTE — CARE PLAN
Problem: Infection  Goal: Will remain free from infection  Outcome: PROGRESSING SLOWER THAN EXPECTED  Will remain afebrile, will continue abx regimen        Problem: Pain Management  Goal: Pain level will decrease to patient’s comfort goal  Outcome: PROGRESSING SLOWER THAN EXPECTED  Pt will have improved pain control , and increased comfort. Improved nausea

## 2017-01-16 NOTE — PROGRESS NOTES
Hospital Medicine ICU Interval Note    Date of Service: 2017    Chief Complaint  26 y.o.-year-old female admitted 2017 with sepsis, recent lap jose a last month    Interval Problem Update  On day 3 of antibiotics, no fever improving wbc count  Patient is very terse, not forthcoming with history    Consultants/Specialty  None    Disposition  Home        Review of Systems   Constitutional: Negative for fever and chills.   Eyes: Negative for blurred vision.   Respiratory: Negative for cough and shortness of breath.    Cardiovascular: Negative for chest pain and palpitations.   Gastrointestinal: Negative for nausea, vomiting and abdominal pain.   Genitourinary: Negative for dysuria.   Musculoskeletal: Negative for myalgias.   Neurological: Negative for dizziness and headaches.      Physical Exam  Laboratory/Imaging   Hemodynamics  Temp (24hrs), Av.7 °C (98.1 °F), Min:36.6 °C (97.8 °F), Max:36.9 °C (98.4 °F)   Temperature: 36.6 °C (97.8 °F)  Pulse  Av.6  Min: 72  Max: 128    Blood Pressure: (!) 90/52 mmHg      Respiratory      Respiration: 18, Pulse Oximetry: 93 %             Fluids  Date 01/15/17 0700 - 17 0659   Shift 3612-1990 0253-0453 6115-3450 24 Hour Total   I  N  T  A  K  E   P.O. 30   30    I.V. 450   450    Shift Total 480   480   O  U  T  P  U  T   Shift Total       Weight (kg) 62.8 62.8 62.8 62.8         Nutrition  Orders Placed This Encounter   Procedures   • DIET ORDER     Standing Status: Standing      Number of Occurrences: 1      Standing Expiration Date:      Order Specific Question:  Diet:     Answer:  Regular [1]     Physical Exam   Constitutional: She is oriented to person, place, and time.   Cardiovascular: Normal rate, regular rhythm and normal heart sounds.    No murmur heard.  Pulmonary/Chest: Effort normal and breath sounds normal. No respiratory distress. She has no wheezes. She has no rales.   Abdominal: Soft. Bowel sounds are normal. She exhibits no distension. There  is tenderness (to palpation).   Stab sites intact, though there is mild cellulitis around the upper one with some fibrinopurulent adherent discharge no weeping and no fluctuance just indurated   Musculoskeletal: Normal range of motion. She exhibits no edema.   Neurological: She is alert and oriented to person, place, and time.   Skin: Skin is warm and dry. No erythema.   Nursing note and vitals reviewed.      Recent Labs      01/14/17   0940  01/15/17   0430  01/16/17   0732   WBC  1.5*  2.8*  3.3*   RBC  4.26  4.34  3.66*   HEMOGLOBIN  10.6*  10.3*  9.1*   HEMATOCRIT  32.9*  34.6*  29.4*   MCV  77.2*  79.7*  80.3*   MCH  24.9*  23.7*  24.9*   MCHC  32.2*  29.8*  31.0*   RDW  47.9  49.5  49.4   PLATELETCT  376  356  352   MPV  8.9*  9.1  8.8*     Recent Labs      01/14/17   0940  01/15/17   0430  01/16/17   0732   SODIUM  130*  134*  140   POTASSIUM  3.2*  4.1  3.8   CHLORIDE  97  102  105   CO2  21  23  25   GLUCOSE  119*  122*  80   BUN  10  3*  4*   CREATININE  0.52  0.50  0.53   CALCIUM  9.7  9.2  9.3     Recent Labs      01/14/17   0940   APTT  32.5   INR  1.06                  Assessment/Plan     Sepsis (CMS-HCC)  Assessment & Plan  -improved, bp fine with fluids  -continue abx, cx so far negative (blood, urine, flu, ua neg)  -lactate normal  -resolving    Neutropenic fever (CMS-HCC)  Assessment & Plan  -subsiding, neutropenia improving  -looks as though she's had this before, and in the family  -She has an appointment with Dr. Garnica next week and declines bone marrow biopsy here; I recommend immunology evaluation as well.  - repeat labs show improvement in neutropenia    Abdominal pain  Assessment & Plan  -oxycodone prn    Elevated serum globulin level  Assessment & Plan  -SPEP ordered    Cellulitis, wound, post-operative  Assessment & Plan  -Dr. Pedro informed by ERP patient is here  -continue IV abx vanco/zosyn  -superficial wound infections do not appear to need debridement will continue IV  antibiotics      Labs reviewed, Medications reviewed and Radiology images reviewed

## 2017-01-16 NOTE — CARE PLAN
Problem: Communication  Goal: The ability to communicate needs accurately and effectively will improve  Intervention: Educate patient and significant other/support system about the plan of care, procedures, treatments, medications and allow for questions  Pt aware and educated on POC      Problem: Infection  Goal: Will remain free from infection  Intervention: Assess signs and symptoms of infection  VSS, pt's lap stabs assessed for further s/s of infection

## 2017-01-16 NOTE — PROGRESS NOTES
Pt a/o x4. C/o very bad headache, possibly migraine, and nausea. Pt medicated for pain and nausea. Pt has congested cough. VSS. Right lap jose a site and middle lap jose a site are red with some purulent drainage noted. Up self with steady gait. Call light is within reach and rounding taking place every hour.

## 2017-01-17 LAB
ALBUMIN SERPL-MCNC: 3.54 G/DL (ref 3.75–5.01)
ALPHA1 GLOB SERPL ELPH-MCNC: 0.48 G/DL (ref 0.19–0.46)
ALPHA2 GLOB SERPL ELPH-MCNC: 1.22 G/DL (ref 0.48–1.05)
B-GLOBULIN SERPL ELPH-MCNC: 0.97 G/DL (ref 0.48–1.1)
GAMMA GLOB SERPL ELPH-MCNC: 1.69 G/DL (ref 0.62–1.51)
INTERPRETATION SERPL IFE-IMP: ABNORMAL
LV EJECT FRACT  99904: 60
LV EJECT FRACT MOD 2C 99903: 52.41
LV EJECT FRACT MOD 4C 99902: 71
LV EJECT FRACT MOD BP 99901: 61.38
PROT SERPL-MCNC: 7.9 G/DL (ref 6–8.3)
VANCOMYCIN TROUGH SERPL-MCNC: 40.3 UG/ML (ref 10–20)

## 2017-01-17 PROCEDURE — A9270 NON-COVERED ITEM OR SERVICE: HCPCS | Performed by: INTERNAL MEDICINE

## 2017-01-17 PROCEDURE — 700105 HCHG RX REV CODE 258: Performed by: HOSPITALIST

## 2017-01-17 PROCEDURE — 302131 K PAD MOTOR: Performed by: INTERNAL MEDICINE

## 2017-01-17 PROCEDURE — 700102 HCHG RX REV CODE 250 W/ 637 OVERRIDE(OP): Performed by: INTERNAL MEDICINE

## 2017-01-17 PROCEDURE — 700111 HCHG RX REV CODE 636 W/ 250 OVERRIDE (IP): Performed by: INTERNAL MEDICINE

## 2017-01-17 PROCEDURE — 700111 HCHG RX REV CODE 636 W/ 250 OVERRIDE (IP)

## 2017-01-17 PROCEDURE — 700105 HCHG RX REV CODE 258: Performed by: INTERNAL MEDICINE

## 2017-01-17 PROCEDURE — 36415 COLL VENOUS BLD VENIPUNCTURE: CPT

## 2017-01-17 PROCEDURE — 93306 TTE W/DOPPLER COMPLETE: CPT | Mod: 26 | Performed by: INTERNAL MEDICINE

## 2017-01-17 PROCEDURE — 85027 COMPLETE CBC AUTOMATED: CPT

## 2017-01-17 PROCEDURE — 93306 TTE W/DOPPLER COMPLETE: CPT

## 2017-01-17 PROCEDURE — 99233 SBSQ HOSP IP/OBS HIGH 50: CPT | Performed by: INTERNAL MEDICINE

## 2017-01-17 PROCEDURE — 80048 BASIC METABOLIC PNL TOTAL CA: CPT

## 2017-01-17 PROCEDURE — 85007 BL SMEAR W/DIFF WBC COUNT: CPT

## 2017-01-17 PROCEDURE — 770009 HCHG ROOM/CARE - ONCOLOGY SEMI PRI*

## 2017-01-17 PROCEDURE — 80202 ASSAY OF VANCOMYCIN: CPT

## 2017-01-17 RX ADMIN — ONDANSETRON 4 MG: 2 INJECTION, SOLUTION INTRAMUSCULAR; INTRAVENOUS at 09:52

## 2017-01-17 RX ADMIN — ONDANSETRON 4 MG: 2 INJECTION, SOLUTION INTRAMUSCULAR; INTRAVENOUS at 05:51

## 2017-01-17 RX ADMIN — PIPERACILLIN AND TAZOBACTAM 4.5 G: 4; .5 INJECTION, POWDER, LYOPHILIZED, FOR SOLUTION INTRAVENOUS; PARENTERAL at 13:35

## 2017-01-17 RX ADMIN — FILGRASTIM 300 MCG: 300 INJECTION, SOLUTION INTRAVENOUS; SUBCUTANEOUS at 13:35

## 2017-01-17 RX ADMIN — TRAMADOL HYDROCHLORIDE 50 MG: 50 TABLET, COATED ORAL at 05:51

## 2017-01-17 RX ADMIN — ONDANSETRON 4 MG: 2 INJECTION, SOLUTION INTRAMUSCULAR; INTRAVENOUS at 17:24

## 2017-01-17 RX ADMIN — VANCOMYCIN HYDROCHLORIDE 1300 MG: 10 INJECTION, POWDER, LYOPHILIZED, FOR SOLUTION INTRAVENOUS at 00:29

## 2017-01-17 RX ADMIN — PIPERACILLIN AND TAZOBACTAM 4.5 G: 4; .5 INJECTION, POWDER, LYOPHILIZED, FOR SOLUTION INTRAVENOUS; PARENTERAL at 05:51

## 2017-01-17 RX ADMIN — PROCHLORPERAZINE EDISYLATE 5 MG: 5 INJECTION INTRAMUSCULAR; INTRAVENOUS at 15:57

## 2017-01-17 RX ADMIN — PIPERACILLIN AND TAZOBACTAM 4.5 G: 4; .5 INJECTION, POWDER, LYOPHILIZED, FOR SOLUTION INTRAVENOUS; PARENTERAL at 23:37

## 2017-01-17 RX ADMIN — PROCHLORPERAZINE EDISYLATE 5 MG: 5 INJECTION INTRAMUSCULAR; INTRAVENOUS at 12:18

## 2017-01-17 RX ADMIN — FAMOTIDINE 20 MG: 10 INJECTION, SOLUTION INTRAVENOUS at 21:22

## 2017-01-17 RX ADMIN — PROCHLORPERAZINE EDISYLATE 10 MG: 5 INJECTION INTRAMUSCULAR; INTRAVENOUS at 23:37

## 2017-01-17 RX ADMIN — FAMOTIDINE 20 MG: 10 INJECTION, SOLUTION INTRAVENOUS at 09:52

## 2017-01-17 RX ADMIN — ONDANSETRON 4 MG: 2 INJECTION, SOLUTION INTRAMUSCULAR; INTRAVENOUS at 01:36

## 2017-01-17 RX ADMIN — ACETAMINOPHEN 500 MG: 500 TABLET, FILM COATED ORAL at 00:34

## 2017-01-17 RX ADMIN — SODIUM CHLORIDE: 9 INJECTION, SOLUTION INTRAVENOUS at 23:37

## 2017-01-17 RX ADMIN — PIPERACILLIN AND TAZOBACTAM 4.5 G: 4; .5 INJECTION, POWDER, LYOPHILIZED, FOR SOLUTION INTRAVENOUS; PARENTERAL at 17:19

## 2017-01-17 RX ADMIN — VANCOMYCIN HYDROCHLORIDE 1300 MG: 10 INJECTION, POWDER, LYOPHILIZED, FOR SOLUTION INTRAVENOUS at 09:53

## 2017-01-17 ASSESSMENT — ENCOUNTER SYMPTOMS
PHOTOPHOBIA: 0
NAUSEA: 1
SPEECH CHANGE: 0
SENSORY CHANGE: 0
HALLUCINATIONS: 0
DIZZINESS: 0
BACK PAIN: 0
DEPRESSION: 0
SHORTNESS OF BREATH: 0
COUGH: 0
NERVOUS/ANXIOUS: 0
VOMITING: 0
CHILLS: 0
ABDOMINAL PAIN: 1
HEADACHES: 0
DIARRHEA: 0
BLURRED VISION: 0
CLAUDICATION: 0
MYALGIAS: 0
FEVER: 0
WEAKNESS: 1
CONSTIPATION: 0

## 2017-01-17 ASSESSMENT — PAIN SCALES - GENERAL
PAINLEVEL_OUTOF10: 0
PAINLEVEL_OUTOF10: 5

## 2017-01-17 NOTE — CARE PLAN
Problem: Infection  Goal: Will remain free from infection  Outcome: PROGRESSING AS EXPECTED  Iv abx and iv fluids    Problem: Pain Management  Goal: Pain level will decrease to patient’s comfort goal  Outcome: PROGRESSING AS EXPECTED  Rest, repositioned, and medicated per MAR

## 2017-01-17 NOTE — PROGRESS NOTES
Hospital Medicine Interval Note  Date of Service:  1/17/2017    Chief Complaint  26 y.o.-year-old female admitted 1/14/2017 with febrile neutropenia.    Interval Problem Update  Cellulitis is improving.  Patient very whiny when asked how she was doing, affect struck me as odd.  Anterior stab incision healing, there is edema and erythema surrounding but reportedly improving, very minimal erythema on lateral stab incisions, bandages removed as incisions are scabbed over.    Consultants/Specialty  none    Disposition  Home when ANC >1k     Review of Systems   Constitutional: Positive for malaise/fatigue. Negative for fever and chills.   HENT: Negative for congestion and nosebleeds.    Eyes: Negative for blurred vision and photophobia.   Respiratory: Negative for cough and shortness of breath.    Cardiovascular: Negative for chest pain, claudication and leg swelling.   Gastrointestinal: Positive for nausea and abdominal pain (around incisions only). Negative for vomiting, diarrhea and constipation.   Genitourinary: Negative for dysuria and hematuria.   Musculoskeletal: Negative for myalgias and back pain.   Skin: Negative for itching and rash.   Neurological: Positive for weakness. Negative for dizziness, sensory change, speech change and headaches.   Psychiatric/Behavioral: Negative for depression and hallucinations. The patient is not nervous/anxious.       Physical Exam Laboratory/Imaging   Filed Vitals:    01/16/17 2350 01/17/17 0549 01/17/17 0900 01/17/17 1200   BP: 102/62 108/79 113/76 106/72   Pulse: 70 68 66 73   Temp: 36.8 °C (98.3 °F) 36.7 °C (98 °F) 36.8 °C (98.2 °F) 36.8 °C (98.2 °F)   TempSrc:       Resp: 16 16 16 16   Height:       Weight:       SpO2: 97% 97% 94% 94%     Physical Exam   Constitutional: She is oriented to person, place, and time. She appears well-developed and well-nourished. No distress.   HENT:   Head: Normocephalic and atraumatic.   Eyes: Conjunctivae are normal. No scleral icterus.    Neck: Neck supple. No JVD present.   Pulmonary/Chest: Effort normal and breath sounds normal. No respiratory distress. She has no wheezes. She exhibits no tenderness.   Abdominal: Soft. Bowel sounds are normal. She exhibits no distension. There is tenderness (at incision sites). There is no rebound.   Musculoskeletal: She exhibits no edema or tenderness.   Neurological: She is alert and oriented to person, place, and time. No cranial nerve deficit.   Skin: Skin is warm and dry. She is not diaphoretic. There is erythema (surrounding anterior stab site, lateral sites are healing well, pain well out of proportion to examination).   Psychiatric: She has a normal mood and affect.   Very odd affect.   Nursing note and vitals reviewed.   Lab Results   Component Value Date/Time    WBC 3.3* 01/16/2017 07:32 AM    HEMOGLOBIN 9.1* 01/16/2017 07:32 AM    HEMATOCRIT 29.4* 01/16/2017 07:32 AM    PLATELET COUNT 352 01/16/2017 07:32 AM     Lab Results   Component Value Date/Time    SODIUM 140 01/16/2017 07:32 AM    POTASSIUM 3.8 01/16/2017 07:32 AM    CHLORIDE 105 01/16/2017 07:32 AM    CO2 25 01/16/2017 07:32 AM    GLUCOSE 80 01/16/2017 07:32 AM    BUN 4* 01/16/2017 07:32 AM    CREATININE 0.53 01/16/2017 07:32 AM      Assessment/Plan    Sepsis (CMS-[facility])  Assessment & Plan  Criteria resolved  Source cellulitis post op from lap jose a    Neutropenic fever (CMS-HCC)  Assessment & Plan  Afebrile  Neutropenic at baseline, follows with Dr Julio as outpatient  D/w Dr Julio, recommended bone marrow biopsy which patient has refused  Okay for neupogen though not ideal given no bone marrow - dose ordered.  Patient requesting to be given IV and not SQ - this formulation not available - SQ ordered    Abdominal pain  Assessment & Plan  Pain management  More complaints of n/v than pain    Elevated serum globulin level  Assessment & Plan  SPEP ordered - no M spike      Cellulitis, wound, post-operative  Assessment & Plan  Vanco/zosyn until  no longer neutropenic       Labs reviewed, Medications reviewed and Radiology images reviewed  Cisneros catheter: No Cisneros      DVT Prophylaxis: Enoxaparin (Lovenox)      Antibiotics: Treating active infection/contamination beyond 24 hours perioperative coverage

## 2017-01-17 NOTE — PROGRESS NOTES
Received shift report from day shift RN. Pt is AO4, teary eyed, and complains of nausea. Provided emotional support and medicated per mar. Discussed POC. Assessed and administered evening medications. Pt upself with steady gait. Bed in lowest position, call light and personal belongings within reach, educated to call if in need of assistance, non skid socks, all needs met at this time.

## 2017-01-17 NOTE — PROGRESS NOTES
"Pt A&Ox4. VS: /76 mmHg  Pulse 66  Temp(Src) 36.8 °C (98.2 °F) (Temporal)  Resp 16  Ht 1.499 m (4' 11.02\")  Wt 62.8 kg (138 lb 7.2 oz)  BMI 27.95 kg/m2  SpO2 94%  LMP 12/13/2016  Breastfeeding? No. Pt denies pain, numbness, tingling, SOB and chest pain. Pt states nauseated, prn zofran administered. Pt offered ativan, but refusing to take.  Pt needs met at this time, call light within reach, hourly rounding in effect, and will continue to monitor.     Discussed with Dr. Ontiveros during rounds that pt nausea is not controlled. Per Dr. Ontiveros place order of 10 mg compazine IV q6 hours PRN for nausea/vomiting      "

## 2017-01-17 NOTE — CARE PLAN
Problem: Safety  Goal: Will remain free from injury  Hourly rounding in effect, pt instructed to call for assistance, bed locked and in lowest position. Bed alarm off, with Nuha as second RN. Pt calls appropriately for assistance and ambulates with a steady gait.        Problem: Knowledge Deficit  Goal: Knowledge of disease process/condition, treatment plan, diagnostic tests, and medications will improve  Pt updated and educated on nursing interventions, medications and POC

## 2017-01-17 NOTE — PROGRESS NOTES
"Pharmacy Kinetics 26 y.o. female on vancomycin day # 4 2017    Currently on Vancomycin 1300 mg iv q8hr    Indication for Treatment: Neutropenic fever    Pertinent history per medical record: Admitted on 2017 for nausea, vomiting and fevers. With concurrent neutropenia also observevd, empiric antibiotic therapy initiated.    Other antibiotics: Piperacillin/tazobactam 4.5g IV Q6h    Allergies: Review of patient's allergies indicates no known allergies.     List concerns for renal function: CT w/contrast 17    Pertinent cultures to date:    17 - respiratory (nasopharyngeal): Negative for influenza  17 - blood (peripheral) x 2: NGTD  17 - urine: NGTD    Recent Labs      01/15/17   0430  17   0732   WBC  2.8*  3.3*   NEUTSPOLYS  3.50*  9.60*   BANDSSTABS  4.40   --      Recent Labs      01/15/17   0430  17   0732   BUN  3*  4*   CREATININE  0.50  0.53   ALBUMIN  3.54*  4.0  3.6     Recent Labs      17   0732   VANCOTROUGH  13.2   No intake or output data in the 24 hours ending 17 1442   Blood pressure 106/72, pulse 73, temperature 36.8 °C (98.2 °F), temperature source Temporal, resp. rate 16, height 1.499 m (4' 11.02\"), weight 62.8 kg (138 lb 7.2 oz), last menstrual period 2016, SpO2 94 %, not currently breastfeeding. Temp (24hrs), Av.8 °C (98.2 °F), Min:36.7 °C (98 °F), Max:36.8 °C (98.3 °F)    A/P   1. Vancomycin dose change: Dosing on hold  2. Next vancomycin level:  @ 0930  3. Goal trough: 16-20 mcg/mL  4. A/P: Patient afebrile overnight without leukocytosis; VSS. Per d/w RN, patient maintaining good UOP. There are no significant concerns for accumulation/clearance, however patient does not appear to be tolerating Q8h dosing as evidenced by supratherapeutic trough; instructed RN to hold subsequent dosing. Remains neutropenic, neupogen started today per MD d/w heme-onc. Cultures negative, plan to continue abx until patient no longer neutropenic. Will " check a level 24 hours from previous dose and reassess dosing at that time. Patient likely a candidate for Q12h dosing. Pharmacy to monitor and adjust as needed.     Melody Alcantara, SebastiánD

## 2017-01-18 VITALS
HEART RATE: 61 BPM | WEIGHT: 138.45 LBS | RESPIRATION RATE: 16 BRPM | DIASTOLIC BLOOD PRESSURE: 60 MMHG | TEMPERATURE: 97.8 F | HEIGHT: 59 IN | SYSTOLIC BLOOD PRESSURE: 102 MMHG | BODY MASS INDEX: 27.91 KG/M2 | OXYGEN SATURATION: 94 %

## 2017-01-18 LAB
ANION GAP SERPL CALC-SCNC: 10 MMOL/L (ref 0–11.9)
ANISOCYTOSIS BLD QL SMEAR: ABNORMAL
BASOPHILS # BLD AUTO: 1 % (ref 0–1.8)
BASOPHILS # BLD: 0.05 K/UL (ref 0–0.12)
BUN SERPL-MCNC: 3 MG/DL (ref 8–22)
CALCIUM SERPL-MCNC: 9.4 MG/DL (ref 8.5–10.5)
CHLORIDE SERPL-SCNC: 104 MMOL/L (ref 96–112)
CO2 SERPL-SCNC: 24 MMOL/L (ref 20–33)
CREAT SERPL-MCNC: 0.8 MG/DL (ref 0.5–1.4)
EOSINOPHIL # BLD AUTO: 0.47 K/UL (ref 0–0.51)
EOSINOPHIL NFR BLD: 10 % (ref 0–6.9)
ERYTHROCYTE [DISTWIDTH] IN BLOOD BY AUTOMATED COUNT: 47.9 FL (ref 35.9–50)
GFR SERPL CREATININE-BSD FRML MDRD: >60 ML/MIN/1.73 M 2
GLUCOSE SERPL-MCNC: 98 MG/DL (ref 65–99)
HCT VFR BLD AUTO: 30 % (ref 37–47)
HGB BLD-MCNC: 9.2 G/DL (ref 12–16)
LYMPHOCYTES # BLD AUTO: 2.4 K/UL (ref 1–4.8)
LYMPHOCYTES NFR BLD: 51 % (ref 22–41)
MANUAL DIFF BLD: NORMAL
MCH RBC QN AUTO: 24.2 PG (ref 27–33)
MCHC RBC AUTO-ENTMCNC: 30.7 G/DL (ref 33.6–35)
MCV RBC AUTO: 78.9 FL (ref 81.4–97.8)
MICROCYTES BLD QL SMEAR: ABNORMAL
MONOCYTES # BLD AUTO: 0.71 K/UL (ref 0–0.85)
MONOCYTES NFR BLD AUTO: 15 % (ref 0–13.4)
MORPHOLOGY BLD-IMP: NORMAL
MYELOCYTES NFR BLD MANUAL: 1 %
NEUTROPHILS # BLD AUTO: 1.03 K/UL (ref 2–7.15)
NEUTROPHILS NFR BLD: 21 % (ref 44–72)
NEUTS BAND NFR BLD MANUAL: 1 % (ref 0–10)
NRBC # BLD AUTO: 0.02 K/UL
NRBC BLD AUTO-RTO: 0.4 /100 WBC
PLATELET # BLD AUTO: 494 K/UL (ref 164–446)
PMV BLD AUTO: 9.1 FL (ref 9–12.9)
POTASSIUM SERPL-SCNC: 3.2 MMOL/L (ref 3.6–5.5)
RBC # BLD AUTO: 3.8 M/UL (ref 4.2–5.4)
RBC BLD AUTO: PRESENT
SODIUM SERPL-SCNC: 138 MMOL/L (ref 135–145)
TOXIC GRANULES BLD QL SMEAR: SLIGHT
WBC # BLD AUTO: 4.7 K/UL (ref 4.8–10.8)

## 2017-01-18 PROCEDURE — A9270 NON-COVERED ITEM OR SERVICE: HCPCS | Performed by: INTERNAL MEDICINE

## 2017-01-18 PROCEDURE — 700111 HCHG RX REV CODE 636 W/ 250 OVERRIDE (IP): Performed by: INTERNAL MEDICINE

## 2017-01-18 PROCEDURE — 700105 HCHG RX REV CODE 258: Performed by: INTERNAL MEDICINE

## 2017-01-18 PROCEDURE — 700102 HCHG RX REV CODE 250 W/ 637 OVERRIDE(OP): Performed by: INTERNAL MEDICINE

## 2017-01-18 PROCEDURE — 99239 HOSP IP/OBS DSCHRG MGMT >30: CPT | Performed by: INTERNAL MEDICINE

## 2017-01-18 RX ORDER — AMOXICILLIN AND CLAVULANATE POTASSIUM 875; 125 MG/1; MG/1
1 TABLET, FILM COATED ORAL EVERY 12 HOURS
Status: DISCONTINUED | OUTPATIENT
Start: 2017-01-18 | End: 2017-01-18 | Stop reason: HOSPADM

## 2017-01-18 RX ORDER — AMOXICILLIN AND CLAVULANATE POTASSIUM 875; 125 MG/1; MG/1
1 TABLET, FILM COATED ORAL EVERY 12 HOURS
Qty: 14 TAB | Refills: 0 | Status: SHIPPED | OUTPATIENT
Start: 2017-01-18 | End: 2017-01-25

## 2017-01-18 RX ADMIN — FILGRASTIM 300 MCG: 300 INJECTION, SOLUTION INTRAVENOUS; SUBCUTANEOUS at 09:57

## 2017-01-18 RX ADMIN — ACETAMINOPHEN 500 MG: 500 TABLET, FILM COATED ORAL at 08:19

## 2017-01-18 RX ADMIN — PROCHLORPERAZINE EDISYLATE 10 MG: 5 INJECTION INTRAMUSCULAR; INTRAVENOUS at 05:33

## 2017-01-18 RX ADMIN — PIPERACILLIN AND TAZOBACTAM 4.5 G: 4; .5 INJECTION, POWDER, LYOPHILIZED, FOR SOLUTION INTRAVENOUS; PARENTERAL at 05:31

## 2017-01-18 RX ADMIN — FAMOTIDINE 20 MG: 10 INJECTION, SOLUTION INTRAVENOUS at 08:26

## 2017-01-18 RX ADMIN — TRAMADOL HYDROCHLORIDE 50 MG: 50 TABLET, COATED ORAL at 05:31

## 2017-01-18 ASSESSMENT — PAIN SCALES - GENERAL
PAINLEVEL_OUTOF10: 7
PAINLEVEL_OUTOF10: 6

## 2017-01-18 NOTE — PROGRESS NOTES
Pt discharged home today. Discussed potassium level with Dr. Ontiveros, no orders received.  Instructions and prescriptions given to patient, all questions answered.  Pt verbalizes her understanding of all instructions, medications, and follow up care. KRYSTAL roy'd.  Ambulated patient down to lobby to wait for ride.

## 2017-01-18 NOTE — PROGRESS NOTES
Received shift report from day shift RN. Pt is AO4 and declines any N/V or pain at this time. Pt ambulatory in mendez with steady gait. Discussed POC. Assessed and administered evening medications. Bed in lowest position, call light and personal belongings within reach, non skid socks, room near nurses station. All needs met at this time.

## 2017-01-18 NOTE — PROGRESS NOTES
Report received from Nuha HOLLAND, assumed pt care.  VSS, assessment complete, AAOx4.  Pt c/o menstrual cramping, tylenol given.  Scheduled medications given as ordered (see MAR). POC is neupogen injection, possible DC.  Pt has no further needs at this time.  Call light within reach, fall precautions in place.  Will continue to monitor.

## 2017-01-18 NOTE — DISCHARGE INSTRUCTIONS
Discharge Instructions    Discharged to home by car with self. Discharged via wheelchair, hospital escort: Yes.  Special equipment needed: Not Applicable    Be sure to schedule a follow-up appointment with your primary care doctor or any specialists as instructed.     Discharge Plan:   Diet Plan: Discussed  Activity Level: Discussed  Confirmed Follow up Appointment: Patient to Call and Schedule Appointment  Confirmed Symptoms Management: Discussed  Medication Reconciliation Updated: Yes  Influenza Vaccine Indication: Patient Refuses    I understand that a diet low in cholesterol, fat, and sodium is recommended for good health. Unless I have been given specific instructions below for another diet, I accept this instruction as my diet prescription.   Other diet: Regular diet as tolerated    Special Instructions: None    · Is patient discharged on Warfarin / Coumadin?   No     · Is patient Post Blood Transfusion?  No    Depression / Suicide Risk    As you are discharged from this [facility] facility, it is important to learn how to keep safe from harming yourself.    Recognize the warning signs:  · Abrupt changes in personality, positive or negative- including increase in energy   · Giving away possessions  · Change in eating patterns- significant weight changes-  positive or negative  · Change in sleeping patterns- unable to sleep or sleeping all the time   · Unwillingness or inability to communicate  · Depression  · Unusual sadness, discouragement and loneliness  · Talk of wanting to die  · Neglect of personal appearance   · Rebelliousness- reckless behavior  · Withdrawal from people/activities they love  · Confusion- inability to concentrate     If you or a loved one observes any of these behaviors or has concerns about self-harm, here's what you can do:  · Talk about it- your feelings and reasons for harming yourself  · Remove any means that you might use to hurt yourself (examples: pills, rope, extension cords,  firearm)  · Get professional help from the community (Mental Health, Substance Abuse, psychological counseling)  · Do not be alone:Call your Safe Contact- someone whom you trust who will be there for you.  · Call your local CRISIS HOTLINE 792-2980 or 866-571-7990  · Call your local Children's Mobile Crisis Response Team [place] (933) 391-0878 or www.Ahonya  · Call the toll free National Suicide Prevention Hotlines   · National Suicide Prevention Lifeline 092-917-VLAA (6823)  · National Hope Line Network 800-SUICIDE (376-2330)

## 2017-01-18 NOTE — CARE PLAN
Problem: Fluid Volume:  Goal: Will maintain balanced intake and output  Outcome: PROGRESSING AS EXPECTED  IV fluids    Problem: Pain Management  Goal: Pain level will decrease to patient’s comfort goal  Outcome: PROGRESSING AS EXPECTED  Rest, repositioned, heat pack, medicated per MAR

## 2017-01-19 LAB
BACTERIA BLD CULT: NORMAL
BACTERIA BLD CULT: NORMAL
SIGNIFICANT IND 70042: NORMAL
SIGNIFICANT IND 70042: NORMAL
SITE SITE: NORMAL
SITE SITE: NORMAL
SOURCE SOURCE: NORMAL
SOURCE SOURCE: NORMAL

## 2017-01-19 NOTE — DISCHARGE SUMMARY
DISCHARGE DIAGNOSES:  Neutropenic fevers, cellulitis post, infection from   laparoscopic cholecystectomy, elevated serum globulin without M spike, sepsis,   which has resolved.    REASON FOR HOSPITALIZATION:  Patient is a 26-year-old female who had her   gallbladder taken out in December.  She was afebrile for the last 3-4 days and   had a temperature of 106.9 and came into the emergency room, she was having   pain in the right upper quadrant over the last 3 days, achy, sharp in nature,   6-10 in intensity bowel movement 2 days prior.  Patient presented with   symptoms consistent with cellulitis, given her elevated lactic acid and   underlying neutropenia which she has had for years.  She was admitted for   treatment of febrile neutropenia and sepsis.  The patient had resolution of   sepsis criteria.  She was initially admitted to the intensive care unit and   transitioned to the medical floor.  Neutropenic precautions were maintained   while she was in the hospital.  Patient's hematologist is Dr. Quijano and   further discussion was held with her.  It was recommended the patient get a   bone marrow biopsy; however, patient continued to refuse.  She has been told   she needs a bone marrow biopsy, multiple times prior and is yet to agree to   have this done.  Given the patient's neutropenia, Neupogen was discussed with   Dr. Quijano, her recommendation was bone marrow biopsy, but if the patient   continued to refuse then Neupogen could be given to get her neutrophil count   up higher.  She received 1 dose of Neupogen and neutrophil count came up to   1030 and she received a second dose on day of discharge.    At this point, she has been transitioned from IV antibiotics over to Augmentin   to complete the treatment of her cellulitis and she has a followup   appointment with Dr. Quijano next week and recommended she continue to keep   this appointment and not miss it like she has been in the past.  Patient also    to follow up with her primary care practitioner and copy of the patient's   discharge was sent to Dr. Pedro with surgery.    DISCHARGE MEDICATIONS:  Augmentin 875/125 p.o. every 12 hours for the next 7   days.  Continue Colace 100 mg p.o. daily, Norco 5/325 one to two tabs every 6   hours as needed.    DIET:  As tolerated.    ACTIVITY:  As tolerated.    Discharge instructions and followup were explained to the patient, agreeable   with discharge.  Discharge process lasted approximately 40 minutes.       ____________________________________     DO HILARIO Fuentes / ADRIAN    DD:  01/18/2017 17:38:20  DT:  01/19/2017 05:34:06    D#:  [id number]  Job#:  [id number]    cc: Cme Pedro MD

## 2018-04-02 ENCOUNTER — HOSPITAL ENCOUNTER (EMERGENCY)
Facility: MEDICAL CENTER | Age: 28
End: 2018-04-02
Attending: EMERGENCY MEDICINE
Payer: MEDICAID

## 2018-04-02 VITALS
HEIGHT: 59 IN | WEIGHT: 136.91 LBS | OXYGEN SATURATION: 98 % | SYSTOLIC BLOOD PRESSURE: 120 MMHG | DIASTOLIC BLOOD PRESSURE: 72 MMHG | BODY MASS INDEX: 27.6 KG/M2 | HEART RATE: 70 BPM | TEMPERATURE: 98.6 F | RESPIRATION RATE: 16 BRPM

## 2018-04-02 DIAGNOSIS — R42 VERTIGO: ICD-10-CM

## 2018-04-02 LAB
ALBUMIN SERPL BCP-MCNC: 4.9 G/DL (ref 3.2–4.9)
ALBUMIN/GLOB SERPL: 1.2 G/DL
ALP SERPL-CCNC: 72 U/L (ref 30–99)
ALT SERPL-CCNC: 10 U/L (ref 2–50)
ANION GAP SERPL CALC-SCNC: 10 MMOL/L (ref 0–11.9)
APPEARANCE UR: CLEAR
AST SERPL-CCNC: 12 U/L (ref 12–45)
BACTERIA #/AREA URNS HPF: NEGATIVE /HPF
BASOPHILS # BLD AUTO: 0.8 % (ref 0–1.8)
BASOPHILS # BLD: 0.03 K/UL (ref 0–0.12)
BILIRUB SERPL-MCNC: 0.3 MG/DL (ref 0.1–1.5)
BILIRUB UR QL STRIP.AUTO: NEGATIVE
BUN SERPL-MCNC: 17 MG/DL (ref 8–22)
CALCIUM SERPL-MCNC: 9.6 MG/DL (ref 8.5–10.5)
CHLORIDE SERPL-SCNC: 104 MMOL/L (ref 96–112)
CO2 SERPL-SCNC: 22 MMOL/L (ref 20–33)
COLOR UR: YELLOW
CREAT SERPL-MCNC: 0.85 MG/DL (ref 0.5–1.4)
CULTURE IF INDICATED INDCX: YES UA CULTURE
EKG IMPRESSION: NORMAL
EOSINOPHIL # BLD AUTO: 0.31 K/UL (ref 0–0.51)
EOSINOPHIL NFR BLD: 8.3 % (ref 0–6.9)
EPI CELLS #/AREA URNS HPF: ABNORMAL /HPF
ERYTHROCYTE [DISTWIDTH] IN BLOOD BY AUTOMATED COUNT: 43.9 FL (ref 35.9–50)
GLOBULIN SER CALC-MCNC: 4.1 G/DL (ref 1.9–3.5)
GLUCOSE SERPL-MCNC: 103 MG/DL (ref 65–99)
GLUCOSE UR STRIP.AUTO-MCNC: NEGATIVE MG/DL
HCG SERPL QL: NEGATIVE
HCT VFR BLD AUTO: 40.4 % (ref 37–47)
HGB BLD-MCNC: 12.4 G/DL (ref 12–16)
HYALINE CASTS #/AREA URNS LPF: ABNORMAL /LPF
IMM GRANULOCYTES # BLD AUTO: 0 K/UL (ref 0–0.11)
IMM GRANULOCYTES NFR BLD AUTO: 0 % (ref 0–0.9)
KETONES UR STRIP.AUTO-MCNC: NEGATIVE MG/DL
LEUKOCYTE ESTERASE UR QL STRIP.AUTO: ABNORMAL
LYMPHOCYTES # BLD AUTO: 2.15 K/UL (ref 1–4.8)
LYMPHOCYTES NFR BLD: 57.6 % (ref 22–41)
MCH RBC QN AUTO: 25.1 PG (ref 27–33)
MCHC RBC AUTO-ENTMCNC: 30.7 G/DL (ref 33.6–35)
MCV RBC AUTO: 81.8 FL (ref 81.4–97.8)
MICRO URNS: ABNORMAL
MONOCYTES # BLD AUTO: 0.58 K/UL (ref 0–0.85)
MONOCYTES NFR BLD AUTO: 15.5 % (ref 0–13.4)
NEUTROPHILS # BLD AUTO: 0.66 K/UL (ref 2–7.15)
NEUTROPHILS NFR BLD: 17.8 % (ref 44–72)
NITRITE UR QL STRIP.AUTO: NEGATIVE
NRBC # BLD AUTO: 0 K/UL
NRBC BLD-RTO: 0 /100 WBC
PH UR STRIP.AUTO: 6 [PH]
PLATELET # BLD AUTO: 367 K/UL (ref 164–446)
PMV BLD AUTO: 9.7 FL (ref 9–12.9)
POTASSIUM SERPL-SCNC: 3.6 MMOL/L (ref 3.6–5.5)
PROT SERPL-MCNC: 9 G/DL (ref 6–8.2)
PROT UR QL STRIP: NEGATIVE MG/DL
RBC # BLD AUTO: 4.94 M/UL (ref 4.2–5.4)
RBC # URNS HPF: ABNORMAL /HPF
RBC UR QL AUTO: NEGATIVE
SODIUM SERPL-SCNC: 136 MMOL/L (ref 135–145)
SP GR UR REFRACTOMETRY: 1.02
SP GR UR STRIP.AUTO: 1.02
TSH SERPL DL<=0.005 MIU/L-ACNC: 1.26 UIU/ML (ref 0.38–5.33)
UROBILINOGEN UR STRIP.AUTO-MCNC: 0.2 MG/DL
WBC # BLD AUTO: 3.7 K/UL (ref 4.8–10.8)
WBC #/AREA URNS HPF: ABNORMAL /HPF

## 2018-04-02 PROCEDURE — A9270 NON-COVERED ITEM OR SERVICE: HCPCS | Performed by: EMERGENCY MEDICINE

## 2018-04-02 PROCEDURE — 81001 URINALYSIS AUTO W/SCOPE: CPT

## 2018-04-02 PROCEDURE — 93005 ELECTROCARDIOGRAM TRACING: CPT | Performed by: EMERGENCY MEDICINE

## 2018-04-02 PROCEDURE — 85025 COMPLETE CBC W/AUTO DIFF WBC: CPT

## 2018-04-02 PROCEDURE — 84703 CHORIONIC GONADOTROPIN ASSAY: CPT

## 2018-04-02 PROCEDURE — 80053 COMPREHEN METABOLIC PANEL: CPT

## 2018-04-02 PROCEDURE — 87086 URINE CULTURE/COLONY COUNT: CPT

## 2018-04-02 PROCEDURE — 99284 EMERGENCY DEPT VISIT MOD MDM: CPT

## 2018-04-02 PROCEDURE — 700102 HCHG RX REV CODE 250 W/ 637 OVERRIDE(OP): Performed by: EMERGENCY MEDICINE

## 2018-04-02 PROCEDURE — 84443 ASSAY THYROID STIM HORMONE: CPT

## 2018-04-02 RX ORDER — MECLIZINE HYDROCHLORIDE 25 MG/1
25 TABLET ORAL ONCE
Status: COMPLETED | OUTPATIENT
Start: 2018-04-02 | End: 2018-04-02

## 2018-04-02 RX ORDER — MECLIZINE HYDROCHLORIDE 25 MG/1
25 TABLET ORAL 3 TIMES DAILY PRN
Qty: 30 TAB | Refills: 0 | Status: SHIPPED | OUTPATIENT
Start: 2018-04-02 | End: 2018-06-13

## 2018-04-02 RX ADMIN — MECLIZINE HYDROCHLORIDE 25 MG: 25 TABLET ORAL at 17:38

## 2018-04-02 NOTE — ED TRIAGE NOTES
.  Chief Complaint   Patient presents with   • Dizziness     x 1 week   • Fatigue   • Nausea     Ambulated to triage with above c.c, pt denies fever.

## 2018-04-03 NOTE — ED PROVIDER NOTES
ED Provider Note    CHIEF COMPLAINT  Chief Complaint   Patient presents with   • Dizziness     x 1 week   • Fatigue   • Nausea       HPI  Vanessa Ballesteros is a 27 y.o. female who presents for evaluation of symptoms of dizziness for a week fatigue nausea. Her symptoms are very nonspecific. Her vertigo is worse moving her head she's had no vomiting she does not believe she is pregnant nothing makes her symptoms worse or better she denies any palpitations or irregular heartbeat she denies any headache blurred vision no history of ataxia or gait disturbance. She is using contraception. She denies any chest pain pleuritic pain or leg swelling. The patient is not vomiting    REVIEW OF SYSTEMS  See HPI for further details. All other systems reviewed and negative except as noted above    PAST MEDICAL HISTORY  Past Medical History:   Diagnosis Date   • Microcytic anemia 2/14/2016       FAMILY HISTORY  Family History   Problem Relation Age of Onset   • Heart Attack Mother    • Stroke Mother    • Hypertension Maternal Grandmother    • Diabetes Maternal Grandfather    • Cancer Paternal Grandmother      lung cancer        SOCIAL HISTORY  Social History     Social History   • Marital status: Single     Spouse name: N/A   • Number of children: N/A   • Years of education: N/A     Social History Main Topics   • Smoking status: Former Smoker     Packs/day: 0.25     Years: 2.00     Types: Cigarettes   • Smokeless tobacco: Not on file      Comment: quit 12/12   • Alcohol use No   • Drug use: No   • Sexual activity: Yes     Partners: Male      Comment: none      Other Topics Concern   • Not on file     Social History Narrative   • No narrative on file       SURGICAL HISTORY  Past Surgical History:   Procedure Laterality Date   • MARCELINO BY LAPAROSCOPY  12/20/2016    Procedure: MARCELINO BY LAPAROSCOPY;  Surgeon: Cem Pedro M.D.;  Location: SURGERY [facility];  Service:        CURRENT MEDICATIONS  Home Medications    **Home  "medications have not yet been reviewed for this encounter**          ALLERGIES  No Known Allergies    PHYSICAL EXAM  VITAL SIGNS: /56   Pulse 77   Temp 37 °C (98.6 °F)   Resp 14   Ht 1.499 m (4' 11\")   Wt 62.1 kg (136 lb 14.5 oz)   LMP 11/19/2012 Comment: +UPT in Dec, regular periods  SpO2 98%   BMI 27.65 kg/m²    Constitutional :  Well developed, Well nourished, No acute distress, Non-toxic appearance.   HENT: Head is atraumatic normocephalic no evidence of infection to the ears  Eyes: Normal-appearing no evidence of infection  Neck: Normal range of motion, No tenderness, Supple, No stridor.   Lymphatic: No cervical adenopathy.   Cardiovascular: Normal heart rate, Normal rhythm, No murmurs, No rubs, No gallops.   Thorax & Lungs: Equal breath sounds bilaterally no rales rhonchi  Skin: Warm, Dry, No erythema, No rash.   Neurologically she is awake she is alert she has no evidence of pronator drift no weakness noted upper and lower extremities gait is intact    Results for orders placed or performed during the hospital encounter of 04/02/18   CBC WITH DIFFERENTIAL   Result Value Ref Range    WBC 3.7 (L) 4.8 - 10.8 K/uL    RBC 4.94 4.20 - 5.40 M/uL    Hemoglobin 12.4 12.0 - 16.0 g/dL    Hematocrit 40.4 37.0 - 47.0 %    MCV 81.8 81.4 - 97.8 fL    MCH 25.1 (L) 27.0 - 33.0 pg    MCHC 30.7 (L) 33.6 - 35.0 g/dL    RDW 43.9 35.9 - 50.0 fL    Platelet Count 367 164 - 446 K/uL    MPV 9.7 9.0 - 12.9 fL    Neutrophils-Polys 17.80 (L) 44.00 - 72.00 %    Lymphocytes 57.60 (H) 22.00 - 41.00 %    Monocytes 15.50 (H) 0.00 - 13.40 %    Eosinophils 8.30 (H) 0.00 - 6.90 %    Basophils 0.80 0.00 - 1.80 %    Immature Granulocytes 0.00 0.00 - 0.90 %    Nucleated RBC 0.00 /100 WBC    Neutrophils (Absolute) 0.66 (L) 2.00 - 7.15 K/uL    Lymphs (Absolute) 2.15 1.00 - 4.80 K/uL    Monos (Absolute) 0.58 0.00 - 0.85 K/uL    Eos (Absolute) 0.31 0.00 - 0.51 K/uL    Baso (Absolute) 0.03 0.00 - 0.12 K/uL    Immature Granulocytes (abs) " 0.00 0.00 - 0.11 K/uL    NRBC (Absolute) 0.00 K/uL   COMP METABOLIC PANEL   Result Value Ref Range    Sodium 136 135 - 145 mmol/L    Potassium 3.6 3.6 - 5.5 mmol/L    Chloride 104 96 - 112 mmol/L    Co2 22 20 - 33 mmol/L    Anion Gap 10.0 0.0 - 11.9    Glucose 103 (H) 65 - 99 mg/dL    Bun 17 8 - 22 mg/dL    Creatinine 0.85 0.50 - 1.40 mg/dL    Calcium 9.6 8.5 - 10.5 mg/dL    AST(SGOT) 12 12 - 45 U/L    ALT(SGPT) 10 2 - 50 U/L    Alkaline Phosphatase 72 30 - 99 U/L    Total Bilirubin 0.3 0.1 - 1.5 mg/dL    Albumin 4.9 3.2 - 4.9 g/dL    Total Protein 9.0 (H) 6.0 - 8.2 g/dL    Globulin 4.1 (H) 1.9 - 3.5 g/dL    A-G Ratio 1.2 g/dL   URINALYSIS CULTURE, IF INDICATED   Result Value Ref Range    Color Yellow     Character Clear     Specific Gravity 1.021 <1.035    Ph 6.0 5.0 - 8.0    Glucose Negative Negative mg/dL    Ketones Negative Negative mg/dL    Protein Negative Negative mg/dL    Bilirubin Negative Negative    Urobilinogen, Urine 0.2 Negative    Nitrite Negative Negative    Leukocyte Esterase Trace (A) Negative    Occult Blood Negative Negative    Micro Urine Req Microscopic     Culture Indicated Yes UA Culture   TSH   Result Value Ref Range    TSH 1.260 0.380 - 5.330 uIU/mL   REFRACTOMETER SG   Result Value Ref Range    Specific Gravity 1.022    BETA-HCG QUALITATIVE SERUM   Result Value Ref Range    Beta-Hcg Qualitative Serum Negative Negative   ESTIMATED GFR   Result Value Ref Range    GFR If African American >60 >60 mL/min/1.73 m 2    GFR If Non African American >60 >60 mL/min/1.73 m 2   URINE MICROSCOPIC (W/UA)   Result Value Ref Range    WBC 2-5 /hpf    RBC 5-10 (A) /hpf    Bacteria Negative None /hpf    Epithelial Cells Moderate (A) /hpf    Hyaline Cast 0-2 /lpf       EKG interpretationEKG Interpretation    Interpreted by me    Rhythm: normal sinus   Rate: normal  Axis: normal  Ectopy: none  Conduction: normal  ST Segments: no acute change  T Waves: no acute change  Q Waves: none    Clinical Impression: no  acute changes and normal EKG    COURSE & MEDICAL DECISION MAKING  Pertinent Labs & Imaging studies reviewed. (See chart for details)  The patient is presenting for evaluation of what appears to be vertigo. Does not appear to have features consistent with central etiology. She has no focal neurological findings not vomiting. She was given meclizine with a lot of improvement of her symptoms. Laboratory data base shows no significant abnormalities. She does have predominance of lymphocytes might be representative of viral infection I'm going to treat the patient symptomatically with meclizine she is given warnings about drowsiness and driving. I've asked her to follow up with her physician in 3 days to see if she is improved at this time and do not see any indication for emergent imaging studies.    FINAL IMPRESSION  1. Vertigo  2.   3.      Electronically signed by: Farhad Robledo, 4/2/2018

## 2018-04-03 NOTE — DISCHARGE INSTRUCTIONS
Dizziness  Dizziness is a common problem. It is a feeling of unsteadiness or light-headedness. You may feel like you are about to faint. Dizziness can lead to injury if you stumble or fall. Anyone can become dizzy, but dizziness is more common in older adults. This condition can be caused by a number of things, including medicines, dehydration, or illness.  Follow these instructions at home:  Taking these steps may help with your condition:  Eating and drinking  · Drink enough fluid to keep your urine clear or pale yellow. This helps to keep you from becoming dehydrated. Try to drink more clear fluids, such as water.  · Do not drink alcohol.  · Limit your caffeine intake if directed by your health care provider.  · Limit your salt intake if directed by your health care provider.  Activity  · Avoid making quick movements.  ¨ Rise slowly from chairs and steady yourself until you feel okay.  ¨ In the morning, first sit up on the side of the bed. When you feel okay, stand slowly while you hold onto something until you know that your balance is fine.  · Move your legs often if you need to  one place for a long time. Tighten and relax your muscles in your legs while you are standing.  · Do not drive or operate heavy machinery if you feel dizzy.  · Avoid bending down if you feel dizzy. Place items in your home so that they are easy for you to reach without leaning over.  Lifestyle  · Do not use any tobacco products, including cigarettes, chewing tobacco, or electronic cigarettes. If you need help quitting, ask your health care provider.  · Try to reduce your stress level, such as with yoga or meditation. Talk with your health care provider if you need help.  General instructions  · Watch your dizziness for any changes.  · Take medicines only as directed by your health care provider. Talk with your health care provider if you think that your dizziness is caused by a medicine that you are taking.  · Tell a friend or  a family member that you are feeling dizzy. If he or she notices any changes in your behavior, have this person call your health care provider.  · Keep all follow-up visits as directed by your health care provider. This is important.  Contact a health care provider if:  · Your dizziness does not go away.  · Your dizziness or light-headedness gets worse.  · You feel nauseous.  · You have reduced hearing.  · You have new symptoms.  · You are unsteady on your feet or you feel like the room is spinning.  Get help right away if:  · You vomit or have diarrhea and are unable to eat or drink anything.  · You have problems talking, walking, swallowing, or using your arms, hands, or legs.  · You feel generally weak.  · You are not thinking clearly or you have trouble forming sentences. It may take a friend or family member to notice this.  · You have chest pain, abdominal pain, shortness of breath, or sweating.  · Your vision changes.  · You notice any bleeding.  · You have a headache.  · You have neck pain or a stiff neck.  · You have a fever.  This information is not intended to replace advice given to you by your health care provider. Make sure you discuss any questions you have with your health care provider.  Document Released: 06/13/2002 Document Revised: 05/25/2017 Document Reviewed: 12/14/2015  [employer] Interactive Patient Education © 2017 [employer]

## 2018-04-03 NOTE — PROGRESS NOTES
Pt verbalized understanding of discharge instructions.   Pt given one script. Ambulated from ED after EKG complete.

## 2018-04-04 LAB
BACTERIA UR CULT: NORMAL
SIGNIFICANT IND 70042: NORMAL
SITE SITE: NORMAL
SOURCE SOURCE: NORMAL

## 2018-06-13 ENCOUNTER — HOSPITAL ENCOUNTER (INPATIENT)
Facility: MEDICAL CENTER | Age: 28
LOS: 2 days | DRG: 872 | End: 2018-06-16
Attending: EMERGENCY MEDICINE | Admitting: HOSPITALIST
Payer: MEDICAID

## 2018-06-13 ENCOUNTER — APPOINTMENT (OUTPATIENT)
Dept: RADIOLOGY | Facility: MEDICAL CENTER | Age: 28
DRG: 872 | End: 2018-06-13
Attending: EMERGENCY MEDICINE
Payer: MEDICAID

## 2018-06-13 DIAGNOSIS — D70.9 NEUTROPENIC FEVER (HCC): ICD-10-CM

## 2018-06-13 DIAGNOSIS — R50.81 NEUTROPENIC FEVER (HCC): ICD-10-CM

## 2018-06-13 LAB
ALBUMIN SERPL BCP-MCNC: 4.9 G/DL (ref 3.2–4.9)
ALBUMIN/GLOB SERPL: 1.1 G/DL
ALP SERPL-CCNC: 72 U/L (ref 30–99)
ALT SERPL-CCNC: 22 U/L (ref 2–50)
ANION GAP SERPL CALC-SCNC: 11 MMOL/L (ref 0–11.9)
ANISOCYTOSIS BLD QL SMEAR: ABNORMAL
APPEARANCE UR: CLEAR
AST SERPL-CCNC: 22 U/L (ref 12–45)
BACTERIA #/AREA URNS HPF: NEGATIVE /HPF
BASOPHILS # BLD AUTO: 1.8 % (ref 0–1.8)
BASOPHILS # BLD: 0.02 K/UL (ref 0–0.12)
BILIRUB SERPL-MCNC: 0.6 MG/DL (ref 0.1–1.5)
BILIRUB UR QL STRIP.AUTO: NEGATIVE
BUN SERPL-MCNC: 13 MG/DL (ref 8–22)
CALCIUM SERPL-MCNC: 9.6 MG/DL (ref 8.5–10.5)
CHLORIDE SERPL-SCNC: 104 MMOL/L (ref 96–112)
CO2 SERPL-SCNC: 22 MMOL/L (ref 20–33)
COLOR UR: YELLOW
CREAT SERPL-MCNC: 0.56 MG/DL (ref 0.5–1.4)
EOSINOPHIL # BLD AUTO: 0 K/UL (ref 0–0.51)
EOSINOPHIL NFR BLD: 0 % (ref 0–6.9)
EPI CELLS #/AREA URNS HPF: ABNORMAL /HPF
ERYTHROCYTE [DISTWIDTH] IN BLOOD BY AUTOMATED COUNT: 42.2 FL (ref 35.9–50)
GLOBULIN SER CALC-MCNC: 4.5 G/DL (ref 1.9–3.5)
GLUCOSE SERPL-MCNC: 105 MG/DL (ref 65–99)
GLUCOSE UR STRIP.AUTO-MCNC: NEGATIVE MG/DL
HCT VFR BLD AUTO: 42.3 % (ref 37–47)
HGB BLD-MCNC: 13.5 G/DL (ref 12–16)
KETONES UR STRIP.AUTO-MCNC: 15 MG/DL
LACTATE BLD-SCNC: 1.2 MMOL/L (ref 0.5–2)
LEUKOCYTE ESTERASE UR QL STRIP.AUTO: NEGATIVE
LYMPHOCYTES # BLD AUTO: 0.61 K/UL (ref 1–4.8)
LYMPHOCYTES NFR BLD: 60.7 % (ref 22–41)
MANUAL DIFF BLD: NORMAL
MCH RBC QN AUTO: 25.8 PG (ref 27–33)
MCHC RBC AUTO-ENTMCNC: 31.9 G/DL (ref 33.6–35)
MCV RBC AUTO: 80.7 FL (ref 81.4–97.8)
METAMYELOCYTES NFR BLD MANUAL: 0.9 %
MICRO URNS: ABNORMAL
MICROCYTES BLD QL SMEAR: ABNORMAL
MONOCYTES # BLD AUTO: 0.13 K/UL (ref 0–0.85)
MONOCYTES NFR BLD AUTO: 12.5 % (ref 0–13.4)
MORPHOLOGY BLD-IMP: NORMAL
NEUTROPHILS # BLD AUTO: 0.24 K/UL (ref 2–7.15)
NEUTROPHILS NFR BLD: 22.3 % (ref 44–72)
NEUTS BAND NFR BLD MANUAL: 1.8 % (ref 0–10)
NITRITE UR QL STRIP.AUTO: NEGATIVE
NRBC # BLD AUTO: 0 K/UL
NRBC BLD-RTO: 0 /100 WBC
PH UR STRIP.AUTO: 6 [PH]
PLATELET # BLD AUTO: 321 K/UL (ref 164–446)
PLATELET BLD QL SMEAR: NORMAL
PMV BLD AUTO: 9.2 FL (ref 9–12.9)
POIKILOCYTOSIS BLD QL SMEAR: NORMAL
POTASSIUM SERPL-SCNC: 3.2 MMOL/L (ref 3.6–5.5)
PROT SERPL-MCNC: 9.4 G/DL (ref 6–8.2)
PROT UR QL STRIP: 30 MG/DL
RBC # BLD AUTO: 5.24 M/UL (ref 4.2–5.4)
RBC # URNS HPF: ABNORMAL /HPF
RBC BLD AUTO: PRESENT
RBC UR QL AUTO: NEGATIVE
SODIUM SERPL-SCNC: 137 MMOL/L (ref 135–145)
SP GR UR STRIP.AUTO: 1.04
TROPONIN I SERPL-MCNC: <0.01 NG/ML (ref 0–0.04)
UROBILINOGEN UR STRIP.AUTO-MCNC: 1 MG/DL
WBC # BLD AUTO: 1 K/UL (ref 4.8–10.8)
WBC #/AREA URNS HPF: ABNORMAL /HPF

## 2018-06-13 PROCEDURE — 96365 THER/PROPH/DIAG IV INF INIT: CPT

## 2018-06-13 PROCEDURE — 81001 URINALYSIS AUTO W/SCOPE: CPT

## 2018-06-13 PROCEDURE — 700102 HCHG RX REV CODE 250 W/ 637 OVERRIDE(OP): Performed by: EMERGENCY MEDICINE

## 2018-06-13 PROCEDURE — 36415 COLL VENOUS BLD VENIPUNCTURE: CPT

## 2018-06-13 PROCEDURE — 87040 BLOOD CULTURE FOR BACTERIA: CPT | Mod: 91

## 2018-06-13 PROCEDURE — 96375 TX/PRO/DX INJ NEW DRUG ADDON: CPT

## 2018-06-13 PROCEDURE — 71045 X-RAY EXAM CHEST 1 VIEW: CPT

## 2018-06-13 PROCEDURE — 700111 HCHG RX REV CODE 636 W/ 250 OVERRIDE (IP): Performed by: EMERGENCY MEDICINE

## 2018-06-13 PROCEDURE — 87086 URINE CULTURE/COLONY COUNT: CPT

## 2018-06-13 PROCEDURE — A9270 NON-COVERED ITEM OR SERVICE: HCPCS | Performed by: EMERGENCY MEDICINE

## 2018-06-13 PROCEDURE — 84484 ASSAY OF TROPONIN QUANT: CPT

## 2018-06-13 PROCEDURE — 80053 COMPREHEN METABOLIC PANEL: CPT

## 2018-06-13 PROCEDURE — 84703 CHORIONIC GONADOTROPIN ASSAY: CPT

## 2018-06-13 PROCEDURE — 85027 COMPLETE CBC AUTOMATED: CPT

## 2018-06-13 PROCEDURE — 83605 ASSAY OF LACTIC ACID: CPT

## 2018-06-13 PROCEDURE — 99285 EMERGENCY DEPT VISIT HI MDM: CPT

## 2018-06-13 PROCEDURE — 700105 HCHG RX REV CODE 258: Performed by: EMERGENCY MEDICINE

## 2018-06-13 PROCEDURE — 85007 BL SMEAR W/DIFF WBC COUNT: CPT

## 2018-06-13 RX ORDER — ACETAMINOPHEN 325 MG/1
650 TABLET ORAL ONCE
Status: COMPLETED | OUTPATIENT
Start: 2018-06-13 | End: 2018-06-13

## 2018-06-13 RX ORDER — ONDANSETRON 2 MG/ML
4 INJECTION INTRAMUSCULAR; INTRAVENOUS ONCE
Status: COMPLETED | OUTPATIENT
Start: 2018-06-13 | End: 2018-06-13

## 2018-06-13 RX ADMIN — ONDANSETRON 4 MG: 2 INJECTION INTRAMUSCULAR; INTRAVENOUS at 22:13

## 2018-06-13 RX ADMIN — ACETAMINOPHEN 650 MG: 325 TABLET, FILM COATED ORAL at 23:19

## 2018-06-13 RX ADMIN — CEFEPIME HYDROCHLORIDE 2 G: 2 INJECTION, POWDER, FOR SOLUTION INTRAVENOUS at 23:18

## 2018-06-13 RX ADMIN — FENTANYL CITRATE 50 MCG: 50 INJECTION, SOLUTION INTRAMUSCULAR; INTRAVENOUS at 22:13

## 2018-06-13 ASSESSMENT — PAIN SCALES - GENERAL: PAINLEVEL_OUTOF10: 7

## 2018-06-14 ENCOUNTER — APPOINTMENT (OUTPATIENT)
Dept: RADIOLOGY | Facility: MEDICAL CENTER | Age: 28
DRG: 872 | End: 2018-06-14
Attending: HOSPITALIST
Payer: MEDICAID

## 2018-06-14 ENCOUNTER — PATIENT OUTREACH (OUTPATIENT)
Dept: HEALTH INFORMATION MANAGEMENT | Facility: OTHER | Age: 28
End: 2018-06-14

## 2018-06-14 ENCOUNTER — APPOINTMENT (OUTPATIENT)
Dept: RADIOLOGY | Facility: MEDICAL CENTER | Age: 28
DRG: 872 | End: 2018-06-14
Attending: EMERGENCY MEDICINE
Payer: MEDICAID

## 2018-06-14 LAB
CRP SERPL HS-MCNC: 12.63 MG/DL (ref 0–0.75)
ERYTHROCYTE [SEDIMENTATION RATE] IN BLOOD BY WESTERGREN METHOD: 26 MM/HOUR (ref 0–20)
EST. AVERAGE GLUCOSE BLD GHB EST-MCNC: 114 MG/DL
HBA1C MFR BLD: 5.6 % (ref 0–5.6)
HCG SERPL QL: NEGATIVE
LACTATE BLD-SCNC: 0.8 MMOL/L (ref 0.5–2)

## 2018-06-14 PROCEDURE — 770020 HCHG ROOM/CARE - TELE (206)

## 2018-06-14 PROCEDURE — 700111 HCHG RX REV CODE 636 W/ 250 OVERRIDE (IP): Performed by: INTERNAL MEDICINE

## 2018-06-14 PROCEDURE — 36415 COLL VENOUS BLD VENIPUNCTURE: CPT

## 2018-06-14 PROCEDURE — 86140 C-REACTIVE PROTEIN: CPT

## 2018-06-14 PROCEDURE — 700111 HCHG RX REV CODE 636 W/ 250 OVERRIDE (IP): Performed by: HOSPITALIST

## 2018-06-14 PROCEDURE — 700101 HCHG RX REV CODE 250: Performed by: INTERNAL MEDICINE

## 2018-06-14 PROCEDURE — 83605 ASSAY OF LACTIC ACID: CPT

## 2018-06-14 PROCEDURE — 700111 HCHG RX REV CODE 636 W/ 250 OVERRIDE (IP): Performed by: FAMILY MEDICINE

## 2018-06-14 PROCEDURE — 700102 HCHG RX REV CODE 250 W/ 637 OVERRIDE(OP): Performed by: FAMILY MEDICINE

## 2018-06-14 PROCEDURE — A9270 NON-COVERED ITEM OR SERVICE: HCPCS | Performed by: FAMILY MEDICINE

## 2018-06-14 PROCEDURE — A9270 NON-COVERED ITEM OR SERVICE: HCPCS | Performed by: HOSPITALIST

## 2018-06-14 PROCEDURE — 83036 HEMOGLOBIN GLYCOSYLATED A1C: CPT

## 2018-06-14 PROCEDURE — 700102 HCHG RX REV CODE 250 W/ 637 OVERRIDE(OP): Performed by: HOSPITALIST

## 2018-06-14 PROCEDURE — 700105 HCHG RX REV CODE 258: Performed by: HOSPITALIST

## 2018-06-14 PROCEDURE — 85652 RBC SED RATE AUTOMATED: CPT

## 2018-06-14 PROCEDURE — 700105 HCHG RX REV CODE 258: Performed by: INTERNAL MEDICINE

## 2018-06-14 PROCEDURE — 700117 HCHG RX CONTRAST REV CODE 255: Performed by: EMERGENCY MEDICINE

## 2018-06-14 PROCEDURE — 74177 CT ABD & PELVIS W/CONTRAST: CPT

## 2018-06-14 PROCEDURE — 99223 1ST HOSP IP/OBS HIGH 75: CPT | Performed by: HOSPITALIST

## 2018-06-14 RX ORDER — CLONIDINE HYDROCHLORIDE 0.1 MG/1
0.1 TABLET ORAL EVERY 6 HOURS PRN
Status: DISCONTINUED | OUTPATIENT
Start: 2018-06-14 | End: 2018-06-16 | Stop reason: HOSPADM

## 2018-06-14 RX ORDER — ONDANSETRON 4 MG/1
4 TABLET, ORALLY DISINTEGRATING ORAL EVERY 4 HOURS PRN
Status: DISCONTINUED | OUTPATIENT
Start: 2018-06-14 | End: 2018-06-16 | Stop reason: HOSPADM

## 2018-06-14 RX ORDER — IBUPROFEN 600 MG/1
600 TABLET ORAL EVERY 6 HOURS PRN
Status: DISCONTINUED | OUTPATIENT
Start: 2018-06-14 | End: 2018-06-16 | Stop reason: HOSPADM

## 2018-06-14 RX ORDER — ONDANSETRON 2 MG/ML
4 INJECTION INTRAMUSCULAR; INTRAVENOUS EVERY 4 HOURS PRN
Status: DISCONTINUED | OUTPATIENT
Start: 2018-06-14 | End: 2018-06-16 | Stop reason: HOSPADM

## 2018-06-14 RX ORDER — SODIUM CHLORIDE AND POTASSIUM CHLORIDE 150; 900 MG/100ML; MG/100ML
INJECTION, SOLUTION INTRAVENOUS CONTINUOUS
Status: DISCONTINUED | OUTPATIENT
Start: 2018-06-14 | End: 2018-06-16

## 2018-06-14 RX ORDER — BISACODYL 10 MG
10 SUPPOSITORY, RECTAL RECTAL
Status: DISCONTINUED | OUTPATIENT
Start: 2018-06-14 | End: 2018-06-16 | Stop reason: HOSPADM

## 2018-06-14 RX ORDER — AMOXICILLIN 250 MG
2 CAPSULE ORAL 2 TIMES DAILY
Status: DISCONTINUED | OUTPATIENT
Start: 2018-06-14 | End: 2018-06-16 | Stop reason: HOSPADM

## 2018-06-14 RX ORDER — PROMETHAZINE HYDROCHLORIDE 25 MG/1
12.5-25 SUPPOSITORY RECTAL EVERY 4 HOURS PRN
Status: DISCONTINUED | OUTPATIENT
Start: 2018-06-14 | End: 2018-06-16 | Stop reason: HOSPADM

## 2018-06-14 RX ORDER — PROMETHAZINE HYDROCHLORIDE 25 MG/1
12.5-25 TABLET ORAL EVERY 4 HOURS PRN
Status: DISCONTINUED | OUTPATIENT
Start: 2018-06-14 | End: 2018-06-16 | Stop reason: HOSPADM

## 2018-06-14 RX ORDER — SODIUM CHLORIDE 9 MG/ML
500 INJECTION, SOLUTION INTRAVENOUS ONCE
Status: COMPLETED | OUTPATIENT
Start: 2018-06-14 | End: 2018-06-14

## 2018-06-14 RX ORDER — ACETAMINOPHEN 325 MG/1
650 TABLET ORAL EVERY 6 HOURS PRN
Status: DISCONTINUED | OUTPATIENT
Start: 2018-06-14 | End: 2018-06-16 | Stop reason: HOSPADM

## 2018-06-14 RX ORDER — SODIUM CHLORIDE 9 MG/ML
500 INJECTION, SOLUTION INTRAVENOUS
Status: COMPLETED | OUTPATIENT
Start: 2018-06-14 | End: 2018-06-14

## 2018-06-14 RX ORDER — POLYETHYLENE GLYCOL 3350 17 G/17G
1 POWDER, FOR SOLUTION ORAL
Status: DISCONTINUED | OUTPATIENT
Start: 2018-06-14 | End: 2018-06-16 | Stop reason: HOSPADM

## 2018-06-14 RX ORDER — SODIUM CHLORIDE 9 MG/ML
INJECTION, SOLUTION INTRAVENOUS CONTINUOUS
Status: DISCONTINUED | OUTPATIENT
Start: 2018-06-14 | End: 2018-06-14

## 2018-06-14 RX ORDER — MORPHINE SULFATE 4 MG/ML
2 INJECTION, SOLUTION INTRAMUSCULAR; INTRAVENOUS EVERY 4 HOURS PRN
Status: DISCONTINUED | OUTPATIENT
Start: 2018-06-14 | End: 2018-06-16 | Stop reason: HOSPADM

## 2018-06-14 RX ADMIN — VANCOMYCIN HYDROCHLORIDE 700 MG: 100 INJECTION, POWDER, LYOPHILIZED, FOR SOLUTION INTRAVENOUS at 22:24

## 2018-06-14 RX ADMIN — CEFEPIME 2 G: 2 INJECTION, POWDER, FOR SOLUTION INTRAMUSCULAR; INTRAVENOUS at 22:29

## 2018-06-14 RX ADMIN — SODIUM CHLORIDE 500 ML: 9 INJECTION, SOLUTION INTRAVENOUS at 13:26

## 2018-06-14 RX ADMIN — MORPHINE SULFATE 2 MG: 4 INJECTION INTRAVENOUS at 04:46

## 2018-06-14 RX ADMIN — CEFEPIME 2 G: 2 INJECTION, POWDER, FOR SOLUTION INTRAMUSCULAR; INTRAVENOUS at 07:05

## 2018-06-14 RX ADMIN — PROMETHAZINE HYDROCHLORIDE 25 MG: 25 TABLET ORAL at 01:53

## 2018-06-14 RX ADMIN — TBO-FILGRASTIM 300 MCG: 300 INJECTION, SOLUTION SUBCUTANEOUS at 13:26

## 2018-06-14 RX ADMIN — VANCOMYCIN HYDROCHLORIDE 700 MG: 100 INJECTION, POWDER, LYOPHILIZED, FOR SOLUTION INTRAVENOUS at 15:09

## 2018-06-14 RX ADMIN — CEFEPIME 2 G: 2 INJECTION, POWDER, FOR SOLUTION INTRAMUSCULAR; INTRAVENOUS at 14:33

## 2018-06-14 RX ADMIN — SODIUM CHLORIDE: 9 INJECTION, SOLUTION INTRAVENOUS at 02:23

## 2018-06-14 RX ADMIN — THERA TABS 1 TABLET: TAB at 06:17

## 2018-06-14 RX ADMIN — IBUPROFEN 600 MG: 600 TABLET, FILM COATED ORAL at 08:08

## 2018-06-14 RX ADMIN — POTASSIUM CHLORIDE AND SODIUM CHLORIDE: 900; 150 INJECTION, SOLUTION INTRAVENOUS at 10:28

## 2018-06-14 RX ADMIN — VANCOMYCIN HYDROCHLORIDE 1500 MG: 100 INJECTION, POWDER, LYOPHILIZED, FOR SOLUTION INTRAVENOUS at 06:15

## 2018-06-14 RX ADMIN — IOHEXOL 100 ML: 350 INJECTION, SOLUTION INTRAVENOUS at 02:17

## 2018-06-14 RX ADMIN — METRONIDAZOLE 500 MG: 500 INJECTION, SOLUTION INTRAVENOUS at 17:29

## 2018-06-14 RX ADMIN — IBUPROFEN 600 MG: 600 TABLET, FILM COATED ORAL at 18:43

## 2018-06-14 ASSESSMENT — LIFESTYLE VARIABLES
ON A TYPICAL DAY WHEN YOU DRINK ALCOHOL HOW MANY DRINKS DO YOU HAVE: 1
HAVE PEOPLE ANNOYED YOU BY CRITICIZING YOUR DRINKING: NO
EVER_SMOKED: YES
TOTAL SCORE: 0
AVERAGE NUMBER OF DAYS PER WEEK YOU HAVE A DRINK CONTAINING ALCOHOL: 1
HAVE YOU EVER FELT YOU SHOULD CUT DOWN ON YOUR DRINKING: NO
CONSUMPTION TOTAL: NEGATIVE
EVER HAD A DRINK FIRST THING IN THE MORNING TO STEADY YOUR NERVES TO GET RID OF A HANGOVER: NO
EVER FELT BAD OR GUILTY ABOUT YOUR DRINKING: NO
ALCOHOL_USE: YES
HOW MANY TIMES IN THE PAST YEAR HAVE YOU HAD 5 OR MORE DRINKS IN A DAY: 0
TOTAL SCORE: 0
TOTAL SCORE: 0

## 2018-06-14 ASSESSMENT — PATIENT HEALTH QUESTIONNAIRE - PHQ9
SUM OF ALL RESPONSES TO PHQ9 QUESTIONS 1 AND 2: 0
1. LITTLE INTEREST OR PLEASURE IN DOING THINGS: NOT AT ALL
SUM OF ALL RESPONSES TO PHQ9 QUESTIONS 1 AND 2: 0
2. FEELING DOWN, DEPRESSED, IRRITABLE, OR HOPELESS: NOT AT ALL
2. FEELING DOWN, DEPRESSED, IRRITABLE, OR HOPELESS: NOT AT ALL
1. LITTLE INTEREST OR PLEASURE IN DOING THINGS: NOT AT ALL
2. FEELING DOWN, DEPRESSED, IRRITABLE, OR HOPELESS: NOT AT ALL
1. LITTLE INTEREST OR PLEASURE IN DOING THINGS: NOT AT ALL
SUM OF ALL RESPONSES TO PHQ9 QUESTIONS 1 AND 2: 0

## 2018-06-14 ASSESSMENT — PAIN SCALES - GENERAL
PAINLEVEL_OUTOF10: 9
PAINLEVEL_OUTOF10: 5
PAINLEVEL_OUTOF10: 9
PAINLEVEL_OUTOF10: 1
PAINLEVEL_OUTOF10: 4

## 2018-06-14 ASSESSMENT — COPD QUESTIONNAIRES
DURING THE PAST 4 WEEKS HOW MUCH DID YOU FEEL SHORT OF BREATH: NONE/LITTLE OF THE TIME
IN THE PAST 12 MONTHS DO YOU DO LESS THAN YOU USED TO BECAUSE OF YOUR BREATHING PROBLEMS: DISAGREE/UNSURE
HAVE YOU SMOKED AT LEAST 100 CIGARETTES IN YOUR ENTIRE LIFE: NO/DON'T KNOW
DO YOU EVER COUGH UP ANY MUCUS OR PHLEGM?: NO/ONLY WITH OCCASIONAL COLDS OR INFECTIONS

## 2018-06-14 ASSESSMENT — ENCOUNTER SYMPTOMS
CARDIOVASCULAR NEGATIVE: 1
RESPIRATORY NEGATIVE: 1
EYES NEGATIVE: 1
CHILLS: 1
MUSCULOSKELETAL NEGATIVE: 1
PSYCHIATRIC NEGATIVE: 1
NAUSEA: 1
FEVER: 1
NEUROLOGICAL NEGATIVE: 1
VOMITING: 1
ABDOMINAL PAIN: 1
DIARRHEA: 1

## 2018-06-14 NOTE — ED NOTES
Tech from Lab called with critical result of ANC of 0.24 at 2243. Critical lab result read back to Tech.   Dr. lu notified of critical lab result at 2243.  Critical lab result read back by  2243.

## 2018-06-14 NOTE — ASSESSMENT & PLAN NOTE
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   Seen by oncologist in the hospital  Continue antibiotics cefepime, DC vancomycin  Start Neupogen  Slightly improved

## 2018-06-14 NOTE — CARE PLAN
Problem: Nutritional:  Goal: Achieve adequate nutritional intake  Diet will advance past NPO/clears and patient will consume >50% of meals  Outcome: NOT MET  See dietary note - RD following

## 2018-06-14 NOTE — PROGRESS NOTES
"Pharmacy Kinetics 27 y.o. female on vancomycin day # 1 2018    Currently on Vancomycin 1500 mg loading dose followed by 700 mg iv q8hr (, , )    Indication for Treatment: febrile neutropenia    Pertinent history per medical record: Admitted on 2018 for evaluation of nausea, vomiting, and diarrhea. The patient states that she has been having these issues for approximately 2 days.  Nothing relieves or exacerbates her symptoms, and her pain has been constant since its onset. She lives with her family and none of them are ill at this time. 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  Appears low risk MRSA..    Other antibiotics: Cefepime 2 g IV q 8 hours    Allergies: Patient has no known allergies.     List concerns for renal function: IV contrast for imaging    Pertinent cultures to date:   18 urine culture in process  18 blood culture in process  18 blood culture: preliminary NG    Recent Labs      18   2138   WBC  1.0*   NEUTSPOLYS  22.30*   BANDSSTABS  1.80     Recent Labs      18   2138   BUN  13   CREATININE  0.56   ALBUMIN  4.9     No results for input(s): VANCOTROUGH, VANCOPEAK, VANCORANDOM in the last 72 hours.  Intake/Output Summary (Last 24 hours) at 18 1352  Last data filed at 18 1200   Gross per 24 hour   Intake             2185 ml   Output             1100 ml   Net             1085 ml      Blood pressure (!) 83/54, pulse 60, temperature 36.9 °C (98.5 °F), resp. rate 18, height 1.499 m (4' 11\"), weight 59.9 kg (132 lb 0.9 oz), last menstrual period 2018, SpO2 95 %, not currently breastfeeding. Temp (24hrs), Av.5 °C (99.5 °F), Min:36.9 °C (98.4 °F), Max:38.7 °C (101.7 °F)      A/P   1. Vancomycin dose change: not indicated  2. Next vancomycin level: tomorrow @ 0530 am  3. Goal trough: 16-20 mcg/mL  4. Comments: Familial netropenia.  ANC today ~ 240.  Filgrastim ordered.  Patient wanted IV, I reviewed with MD and changed route to SQ.  Patient " accepted.  Follow cultures.  Monitor ANC.    TASHI Ballesteros, PharmD, BCPS

## 2018-06-14 NOTE — H&P
" Hospital Medicine History and Physical    Date of Service  6/14/2018    Chief Complaint  Chief Complaint   Patient presents with   • Nausea/Vomiting/Diarrhea     Pt states N/V/D for past two days.  Pt states \"large amounts\" of emesis and diarrhea.  Pt states associated chest wall pain in her sternum and bodies aches 7/10       History of Presenting Illness  27 y.o. female with known history of neutropenia, apparent familial, was in her usual state of health until 2 days prior to admission.  She reports the somewhat sudden onset of diarrhea, which over the next several hours progressed to include abdominal pain, nausea and vomiting.  She reports the abdominal pain as somewhat diffuse, with no specific radiation.  She had no sick contacts.  She denied any exacerbating or alleviating factors.  She did have significant fever and chills, which when they did not subside prompted her visit.  She currently has no other complaints.      Primary Care Physician  Giuseppe Bravo P.A.-C.    Consultants  none    Code Status  Full code     Review of Systems  Review of Systems   Constitutional: Positive for chills, fever and malaise/fatigue.   HENT: Negative.    Eyes: Negative.    Respiratory: Negative.    Cardiovascular: Negative.    Gastrointestinal: Positive for abdominal pain, diarrhea, nausea and vomiting.   Genitourinary: Negative.    Musculoskeletal: Negative.    Skin: Negative.    Neurological: Negative.    Endo/Heme/Allergies: Negative.    Psychiatric/Behavioral: Negative.         Past Medical History  Past Medical History:   Diagnosis Date   • Microcytic anemia 2/14/2016       Surgical History  Past Surgical History:   Procedure Laterality Date   • MARCELINO BY LAPAROSCOPY  12/20/2016    Procedure: MARCELINO BY LAPAROSCOPY;  Surgeon: Cem Pedro M.D.;  Location: SURGERY [facility];  Service:    • OTHER ABDOMINAL SURGERY         Medications  No current facility-administered medications on file prior to encounter.  "     Current Outpatient Prescriptions on File Prior to Encounter   Medication Sig Dispense Refill   • etonogestrel (NEXPLANON) 68 MG Implant implant Inject 1 Each as instructed Once.         Family History  Family History   Problem Relation Age of Onset   • Heart Attack Mother    • Stroke Mother    • Hypertension Maternal Grandmother    • Diabetes Maternal Grandfather    • Cancer Paternal Grandmother      lung cancer        Social History  Social History   Substance Use Topics   • Smoking status: Former Smoker     Packs/day: 0.25     Years: 2.00     Types: Cigarettes   • Smokeless tobacco: Never Used      Comment: quit    • Alcohol use No       Allergies  No Known Allergies     Physical Exam  Laboratory   Hemodynamics  Temp (24hrs), Av.1 °C (100.6 °F), Min:37.7 °C (99.9 °F), Max:38.7 °C (101.7 °F)   Temperature: (!) 38.7 °C (101.7 °F)  Pulse  Av.5  Min: 97  Max: 120 Heart Rate (Monitored): (!) 101  Blood Pressure: 105/65, NIBP: 106/62      Respiratory      Respiration: 18, Pulse Oximetry: 95 %             Physical Exam   Constitutional: She is oriented to person, place, and time. She appears well-developed and well-nourished. No distress.   HENT:   Head: Normocephalic and atraumatic.   Eyes: Conjunctivae are normal. Pupils are equal, round, and reactive to light.   Neck: Normal range of motion. Neck supple. No tracheal deviation present. No thyromegaly present.   Cardiovascular: Normal rate, regular rhythm and normal heart sounds.  Exam reveals no gallop and no friction rub.    No murmur heard.  Pulmonary/Chest: Effort normal and breath sounds normal. No respiratory distress. She has no wheezes. She has no rales.   Abdominal: Soft. Bowel sounds are normal. She exhibits no distension. There is no tenderness. There is no rebound.   Musculoskeletal: Normal range of motion. She exhibits no edema or deformity.   Lymphadenopathy:     She has no cervical adenopathy.   Neurological: She is alert and oriented  to person, place, and time. No cranial nerve deficit.   Skin: Skin is warm and dry. She is not diaphoretic.   Psychiatric: She has a normal mood and affect.   Nursing note and vitals reviewed.      Recent Labs      06/13/18 2138   WBC  1.0*   RBC  5.24   HEMOGLOBIN  13.5   HEMATOCRIT  42.3   MCV  80.7*   MCH  25.8*   MCHC  31.9*   RDW  42.2   PLATELETCT  321   MPV  9.2     Recent Labs      06/13/18 2138   SODIUM  137   POTASSIUM  3.2*   CHLORIDE  104   CO2  22   GLUCOSE  105*   BUN  13   CREATININE  0.56   CALCIUM  9.6     Recent Labs      06/13/18 2138   ALTSGPT  22   ASTSGOT  22   ALKPHOSPHAT  72   TBILIRUBIN  0.6   GLUCOSE  105*                 Lab Results   Component Value Date    TROPONINI <0.01 06/13/2018     Urinalysis:    Lab Results  Component Value Date/Time   SPECGRAVITY 1.044 06/13/2018 2138   GLUCOSEUR Negative 06/13/2018 2138   KETONES 15 (A) 06/13/2018 2138   NITRITE Negative 06/13/2018 2138   WBCURINE 0-2 06/13/2018 2138   RBCURINE 10-20 (A) 06/13/2018 2138   BACTERIA Negative 06/13/2018 2138   EPITHELCELL Few 06/13/2018 2138        Imaging  Normal chest radiograph    Assessment/Plan     I anticipate this patient will require at least two midnights for appropriate medical management, necessitating inpatient admission.    * Sepsis (HCC)- (present on admission)   Assessment & Plan    This is sepsis (without associated acute organ dysfunction).  Unclear source, but in setting of chronic neutropenia, with ANC now 240, will start on vancomycin and cefepime pending culture results.          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  Does follow with outpatient oncology, may need neupogen if no source found as cause.  Protective isolation ordered.          Hypokalemia- (present on admission)   Assessment & Plan    Replacement.         Abdominal pain- (present on  admission)   Assessment & Plan    Evidence of severe enteritis on imaging.  Continue with IV antibiotics, IVF, monitor.                VTE prophylaxis: SCD, lovenox.

## 2018-06-14 NOTE — PROGRESS NOTES
Shift report received and assessment completed. No family at bedside. Pt is lethargic, but arousable. Pt has no immediate  Indications of distress. Call light placed within reach, bed in lowest position, and bed alarm is set. Will continue to monitor patient.

## 2018-06-14 NOTE — PROGRESS NOTES
Bedside report received from ER nurse. Assumed care of pt. Pt transferred to T717 with ACLS RN, Valentina. Pt awake, laying in bed. A/Ox4, tachycardia and BP 93/51. C/o of abdominal pain and nausea. Medicated per MAR. Pt oriented to unit. POC reviewed and white board updated. Tele box on. Call light in reach. Bed locked in lowest position with 2 upper bed rails up. Treaded socks on.

## 2018-06-14 NOTE — ED PROVIDER NOTES
"ED Provider Note    CHIEF COMPLAINT  Chief Complaint   Patient presents with   • Nausea/Vomiting/Diarrhea     Pt states N/V/D for past two days.  Pt states \"large amounts\" of emesis and diarrhea.  Pt states associated chest wall pain in her sternum and bodies aches 7/10       HPI  Vanessa Ballesteros is a 27 y.o. female here for evaluation of nausea, vomiting, and diarrhea. The patient states that she has been having these issues for approximately 2 days, and that she has burning of the chest with the vomiting. She has no history of the same, and states that she has no ill contacts. There is no radiation of her pain to the back, and she has no headache. She remains some epigastric pain as well, again without radiation to the back. Nothing relieves or exacerbates her symptoms, and her pain has been constant since its onset. She lives with her family and none of them are ill at this time. She describes intermittent sharp pains.    PAST MEDICAL HISTORY   has a past medical history of Microcytic anemia (2/14/2016).    SOCIAL HISTORY  Social History     Social History Main Topics   • Smoking status: Former Smoker     Packs/day: 0.25     Years: 2.00     Types: Cigarettes   • Smokeless tobacco: Not on file      Comment: quit 12/12   • Alcohol use No   • Drug use: No   • Sexual activity: Yes     Partners: Male      Comment: none        SURGICAL HISTORY   has a past surgical history that includes jose a by laparoscopy (12/20/2016).    CURRENT MEDICATIONS  Home Medications     Reviewed by Melissa Maldonado R.N. (Registered Nurse) on 06/13/18 at 2148  Med List Status: Partial   Medication Last Dose Status   docusate sodium (COLACE) 100 MG Cap 1/13/2017 Active   etonogestrel (NEXPLANON) 68 MG Implant implant 11/24/2014 Active   hydrocodone-acetaminophen (NORCO) 5-325 MG Tab per tablet 1/13/2017 Active   meclizine (ANTIVERT) 25 MG Tab  Active                ALLERGIES  No Known Allergies    REVIEW OF SYSTEMS  See HPI for further " details. Review of systems as above, otherwise all other systems are negative.     PHYSICAL EXAM  Constitutional: Well developed, well nourished. Moderate acute distress.  HEENT: Normocephalic, atraumatic. Posterior pharynx clear and moist.  Eyes:  EOMI. Normal sclera.  Neck: Supple, Full range of motion, nontender.  Chest/Pulmonary: clear to ausculation. Symmetrical expansion.   Cardio: Tachycardic rate and rhythm with no murmur.   Abdomen: Soft, diffusely tender, No peritoneal signs. No guarding. No palpable masses.  Back: No CVA tenderness, nontender midline, no step offs.  Musculoskeletal: No deformity, no edema, neurovascular intact.   Neuro: Clear speech, appropriate, cooperative, cranial nerves II-XII grossly intact.  Psych: Normal mood and affect    Results for orders placed or performed during the hospital encounter of 06/13/18   Lactic acid (lactate)   Result Value Ref Range    Lactic Acid 1.2 0.5 - 2.0 mmol/L   CBC WITH DIFFERENTIAL   Result Value Ref Range    WBC 1.0 (LL) 4.8 - 10.8 K/uL    RBC 5.24 4.20 - 5.40 M/uL    Hemoglobin 13.5 12.0 - 16.0 g/dL    Hematocrit 42.3 37.0 - 47.0 %    MCV 80.7 (L) 81.4 - 97.8 fL    MCH 25.8 (L) 27.0 - 33.0 pg    MCHC 31.9 (L) 33.6 - 35.0 g/dL    RDW 42.2 35.9 - 50.0 fL    Platelet Count 321 164 - 446 K/uL    MPV 9.2 9.0 - 12.9 fL    Nucleated RBC 0.00 /100 WBC    NRBC (Absolute) 0.00 K/uL    Neutrophils-Polys 22.30 (L) 44.00 - 72.00 %    Lymphocytes 60.70 (H) 22.00 - 41.00 %    Monocytes 12.50 0.00 - 13.40 %    Eosinophils 0.00 0.00 - 6.90 %    Basophils 1.80 0.00 - 1.80 %    Neutrophils (Absolute) 0.24 (LL) 2.00 - 7.15 K/uL    Lymphs (Absolute) 0.61 (L) 1.00 - 4.80 K/uL    Monos (Absolute) 0.13 0.00 - 0.85 K/uL    Eos (Absolute) 0.00 0.00 - 0.51 K/uL    Baso (Absolute) 0.02 0.00 - 0.12 K/uL    Anisocytosis 1+     Microcytosis 1+    COMP METABOLIC PANEL   Result Value Ref Range    Sodium 137 135 - 145 mmol/L    Potassium 3.2 (L) 3.6 - 5.5 mmol/L    Chloride 104 96 -  112 mmol/L    Co2 22 20 - 33 mmol/L    Anion Gap 11.0 0.0 - 11.9    Glucose 105 (H) 65 - 99 mg/dL    Bun 13 8 - 22 mg/dL    Creatinine 0.56 0.50 - 1.40 mg/dL    Calcium 9.6 8.5 - 10.5 mg/dL    AST(SGOT) 22 12 - 45 U/L    ALT(SGPT) 22 2 - 50 U/L    Alkaline Phosphatase 72 30 - 99 U/L    Total Bilirubin 0.6 0.1 - 1.5 mg/dL    Albumin 4.9 3.2 - 4.9 g/dL    Total Protein 9.4 (H) 6.0 - 8.2 g/dL    Globulin 4.5 (H) 1.9 - 3.5 g/dL    A-G Ratio 1.1 g/dL   URINALYSIS   Result Value Ref Range    Color Yellow     Character Clear     Specific Gravity 1.044 <1.035    Ph 6.0 5.0 - 8.0    Glucose Negative Negative mg/dL    Ketones 15 (A) Negative mg/dL    Protein 30 (A) Negative mg/dL    Bilirubin Negative Negative    Urobilinogen, Urine 1.0 Negative    Nitrite Negative Negative    Leukocyte Esterase Negative Negative    Occult Blood Negative Negative    Micro Urine Req Microscopic    URINE MICROSCOPIC (W/UA)   Result Value Ref Range    WBC 0-2 /hpf    RBC 10-20 (A) /hpf    Bacteria Negative None /hpf    Epithelial Cells Few /hpf   ESTIMATED GFR   Result Value Ref Range    GFR If African American >60 >60 mL/min/1.73 m 2    GFR If Non African American >60 >60 mL/min/1.73 m 2   DIFFERENTIAL MANUAL   Result Value Ref Range    Bands-Stabs 1.80 0.00 - 10.00 %    Metamyelocytes 0.90 %    Manual Diff Status PERFORMED    PERIPHERAL SMEAR REVIEW   Result Value Ref Range    Peripheral Smear Review see below    PLATELET ESTIMATE   Result Value Ref Range    Plt Estimation Normal    MORPHOLOGY   Result Value Ref Range    RBC Morphology Present     Poikilocytosis 1+    TROPONIN   Result Value Ref Range    Troponin I <0.01 0.00 - 0.04 ng/mL     DX-CHEST-PORTABLE (1 VIEW)   Final Result         1.  No acute cardiopulmonary disease.            PROCEDURES     MEDICAL RECORD  I have reviewed patient's medical record and pertinent results are listed above.    COURSE & MEDICAL DECISION MAKING  I have reviewed any medical record information, laboratory  studies and radiographic results as noted above.    11:26 PM  The pt will be admitted to Dr. Puga for continued care.  She will be admitted in guarded condition.     Differential diagnoses include but not limited to: pneumonia, sepsis, neutropenic fever          FINAL IMPRESSION  1. Neutropenic fever (HCC)        Electronically signed by: Pernell Sheldon, 6/13/2018 10:03 PM

## 2018-06-14 NOTE — ED NOTES
Rosaura from Lab called with critical result of WBC of 1.0 and ANC of 0.26 at 2213. Critical lab result read back to Rosaura.   Dr. Lu notified of critical lab result at 2214.  Critical lab result read back by Dr. lu.

## 2018-06-14 NOTE — ASSESSMENT & PLAN NOTE
This is sepsis (without associated acute organ dysfunction).    We will put the patient on full sepsis protocol including lactic acid checks  ivf and iv abx  From ileitis  Culture pending

## 2018-06-14 NOTE — PROGRESS NOTES
"Pharmacy Kinetics 27 y.o. female on vancomycin day #1 2018    Received vancomycin loading dose    Indication for Treatment: Neutropenic fever    Pertinent history per medical record: Admitted on 2018. Presents for evaluation of nausea, vomiting, and diarrhea. The patient states that she has been having these issues for approximately 2 days.  Nothing relieves or exacerbates her symptoms, and her pain has been constant since its onset. She lives with her family and none of them are ill at this time. 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  Appears low risk MRSA.    Other antibiotics: Cefepime (consider adding Flagyl)    Imaging: CT ab/pelvis with:   Changes compatible with severe enteritis, involvement of the terminal ileum raises concern for inflammatory bowel disease.    Allergies: Patient has no known allergies.     List concerns for renal function: IV contrast for imaging     Pertinent cultures to date:   18 blood cultures x2 - in process  18 urine culture - in process    Recent Labs      18   2138   WBC  1.0*   NEUTSPOLYS  22.30*   BANDSSTABS  1.80     Recent Labs      18   2138   BUN  13   CREATININE  0.56   ALBUMIN  4.9     No results for input(s): VANCOTROUGH, VANCOPEAK, VANCORANDOM in the last 72 hours.  Intake/Output Summary (Last 24 hours) at 18 0444  Last data filed at 18 0300   Gross per 24 hour   Intake              666 ml   Output                0 ml   Net              666 ml      Blood pressure (!) 93/51, pulse (!) 107, temperature 37.1 °C (98.7 °F), resp. rate 18, height 1.499 m (4' 11\"), weight 59.9 kg (132 lb 0.9 oz), last menstrual period 2018, SpO2 95 %, not currently breastfeeding. Temp (24hrs), Av.9 °C (100.2 °F), Min:37.1 °C (98.7 °F), Max:38.7 °C (101.7 °F)      A/P   1. Vancomycin dose change: Start vancomycin 12mg/kg q8hr (700mg)  2. Next vancomycin level: Prior to the 5th dose @1030 if remains on vancomycin (ordered)  3. Goal " trough: 16-20 mcg/ml  4. Comments: Dosing as above. Monitor renal function. Pharmacy will follow. Narrow therapy as able.     Long Graham, PharmD, BCPS

## 2018-06-14 NOTE — DIETARY
"Nutrition services: Day 0 of admit.  Vanessa Ballesteros is a 27 y.o. female with admitting DX of sepsis/neutropenic fever.  Consult received for poor PO intake pta per nutrition admit screen.    Visited pt at bedside, pt appears adequately nourished. Pt reports that she has had poor appetite x 2 days. Pt does report GI distress for the past few days as well. Pt denies any changes in wt. Reports her UBW = 128# which is fairly consistent with current wt = 132# per stand up scale. Pt currently is on clear liquid diet - will monitor for diet advancement and adequate PO intake.     Assessment:  Height: 149.9 cm (4' 11\")  Weight: 59.9 kg (132 lb 0.9 oz)  Body mass index is 26.67 kg/m².  Diet/Intake: Clear liquid; no intake recorded at this time    Evaluation:   1. Reduced appetite and GI distress (emesis and diarrhea) for a few days pta  2. Carries hx of familial neutropenia  3. Theragran in MAR  4. K 3.2  5. Reduced appetite likely 2' to current acute illness with GI distress - will likely improve with treament    Recommendations/Plan:  1. Advance diet past NPO/clears as medically appropriate   2. Encourage intake of meals  3. Document intake of all meals as % taken in ADL's to provide interdisciplinary communication across all shifts.   4. Monitor weight.  5. Nutrition rep will continue to see patient for ongoing meal and snack preferences.   6. RD to monitor PO intake, wt trends, and nutrition labs/meds            "

## 2018-06-14 NOTE — CARE PLAN
Problem: Infection  Goal: Will remain free from infection    Intervention: Implement standard precautions and perform hand washing before and after patient contact  Protective precautions in place. Hand hygiene in use before entering room, before pt contact and when leaving the room.      Problem: Knowledge Deficit  Goal: Knowledge of disease process/condition, treatment plan, diagnostic tests, and medications will improve    Intervention: Explain information regarding disease process/condition, treatment plan, diagnostic tests, and medications and document in education  Pt educated on plan of care, medications, pain management, and disease processes. Pt verbalized understanding and was encouraged to ask questions. All questions answered.

## 2018-06-14 NOTE — CONSULTS
DATE OF SERVICE:  06/14/2018    HEMATOLOGY CONSULTATION    CONSULT REQUESTED BY:  Ese Orta MD    REASON FOR CONSULTATION:  Febrile neutropenia.    HISTORY OF PRESENT ILLNESS:  The patient is a 27-year-old female who is seen   previously in my practice by Dr. Quijano.  She has a known family history of   neutropenia.  Her mother and her daughter also have the same chronic   neutropenia.  Her daughter has been evaluated at [facility] with multiple genetic   studies, which turned out to be negative.  Her mother had several bone marrows   all of which also turned out to be negative.  The patient does get infections   easily, although not frequently.  She has not been on a constant dose of   Neupogen.  She only takes Neupogen when she gets an infection and this usually   facilitates recovery quite quickly.  She did have a problem with her   gallbladder surgery where the laparoscopic sites got infected by quickly   cleared when she was on antibiotics and Neupogen.  Unfortunately, they did not   give her Neupogen before the treatment.  On this admission, her white count   was 1.0 with an ANC of 0.24, hemoglobin is 13.5 and she has iron deficiency   indices, platelet count is normal at 321,000.  Chemistry panel shows an   elevated globulin, but otherwise normal liver functions and renal function.    She had a normal lactic acid.  Her C-reactive protein was elevated at 12.65.    She did have a CT scan of the abdomen and pelvis, which showed some severe   enteritis involving the terminal ileum and some hepatomegaly, but no evidence   of any splenomegaly.  Her cultures have been negative so far.  She was started   on Maxipime and vancomycin.  She has become afebrile very quickly and is   otherwise feeling much better.  She is not having problems with diarrhea or   nausea or vomiting, although she did have some abdominal pain and nausea and   vomiting on admission.  She is now sitting up in bed and eating without any    difficulties.    PAST MEDICAL HISTORY:  Chronic neutropenia - familial, laparoscopic   cholecystectomy, other abdominal surgeries.    FAMILY HISTORY:  Her mother and daughter have familial neutropenia.  Her   mother also had a heart attack and stroke.  She has a maternal grandmother who   had hypertension and diabetes and a paternal grandmother who had lung cancer.    SOCIAL HISTORY:  The patient was a cigarette smoker, but quit in 2012.  She   does not drink alcohol.    REVIEW OF SYSTEMS:  Negative other than present illness.    PHYSICAL EXAMINATION:  VITAL SIGNS:  Temperature is 36.9, blood pressure is low at 83/54, pulse is   60, and respirations 18.  GENERAL:  Well-developed, well-nourished, healthy appearing 27-year-old female   who is sitting up in bed eating lunch.  HEENT:  Sclerae and conjunctivae are nonicteric.  LYMPH NODES:  None were palpable.  LUNGS:  Clear.  HEART:  Regular rate and rhythm.  ABDOMEN:  Bowel sounds are normal.  No organomegaly or masses or rebound.  EXTREMITIES:  No edema, clubbing or cyanosis.    IMPRESSION:  1.  Familial neutropenia -- rapidly response to Neupogen.  2.  Status post laparoscopic cholecystectomy for which she had an infection on   12/20/2016.  3.  Abdominal pain -- CT scan suggested possible ileitis.    PLAN:  At this time, she appears to be definitely improving with Neupogen.    She gives a history that this has been the pattern whenever she has had an   infection.  She quickly responds to Neupogen.  I suspect that she will be able   to be discharged in a day or 2 if her cultures remain negative and she   remains afebrile.  Her abdominal symptoms are of some concern, but if these   resolve, she can certainly be followed as an outpatient by her PCP.  I   instructed the patient that if she has further difficulties with infections or   any concerns about taking Neupogen to make an appointment to see us again in   our office.       ____________________________________      MD DEE DEE ROGERS / ADRIAN    DD:  06/14/2018 14:55:39  DT:  06/14/2018 15:31:03    D#:  [id number]  Job#:  [id number]

## 2018-06-14 NOTE — PROGRESS NOTES
Patient is examed and agreed with H&P assessment and plan.    Discussed patient case with oncologist and recommend Neupogen.  Patient does have history of familial thrombocytopenia.  Patient also developed signs of low blood pressure today.  Sepsis protocol was already initiated and patient will receive fluid bolus.

## 2018-06-14 NOTE — ED TRIAGE NOTES
"Chief Complaint   Patient presents with   • Nausea/Vomiting/Diarrhea     Pt states N/V/D for past two days.  Pt states \"large amounts\" of emesis and diarrhea.  Pt states associated chest wall pain in her sternum and bodies aches 7/10     Pt ambulatory to triage with above complaint.  Pt presents to triage wearing mask. Pt states she has come in to ED \"because I haven't been able to hold any fluids for the last several days.\"  Pt denies blood in emesis or vomit.  Pt denies food poisoning.  Pt states she took tylenol this morning, multicold medicine this afternoon and ibuprofen this evening.    Pt returned to [facility], educated on triage process, and to inform staff of any changes or concerns.    Blood pressure 105/65, pulse (!) 112, temperature 37.7 °C (99.9 °F), resp. rate 18, height 1.499 m (4' 11\"), weight 58.6 kg (129 lb 3 oz), last menstrual period 11/19/2012, SpO2 100 %.      "

## 2018-06-15 PROBLEM — K50.00 ILEITIS, TERMINAL (HCC): Status: ACTIVE | Noted: 2018-06-15

## 2018-06-15 LAB
ANION GAP SERPL CALC-SCNC: 8 MMOL/L (ref 0–11.9)
ANISOCYTOSIS BLD QL SMEAR: ABNORMAL
BACTERIA UR CULT: NORMAL
BASOPHILS # BLD AUTO: 0.9 % (ref 0–1.8)
BASOPHILS # BLD: 0.02 K/UL (ref 0–0.12)
BUN SERPL-MCNC: 4 MG/DL (ref 8–22)
CALCIUM SERPL-MCNC: 8.5 MG/DL (ref 8.5–10.5)
CHLORIDE SERPL-SCNC: 112 MMOL/L (ref 96–112)
CO2 SERPL-SCNC: 20 MMOL/L (ref 20–33)
CREAT SERPL-MCNC: 0.42 MG/DL (ref 0.5–1.4)
EOSINOPHIL # BLD AUTO: 0.3 K/UL (ref 0–0.51)
EOSINOPHIL NFR BLD: 11.6 % (ref 0–6.9)
ERYTHROCYTE [DISTWIDTH] IN BLOOD BY AUTOMATED COUNT: 45.1 FL (ref 35.9–50)
GIANT PLATELETS BLD QL SMEAR: NORMAL
GLUCOSE SERPL-MCNC: 95 MG/DL (ref 65–99)
HCT VFR BLD AUTO: 34.1 % (ref 37–47)
HGB BLD-MCNC: 10.7 G/DL (ref 12–16)
LYMPHOCYTES # BLD AUTO: 1.55 K/UL (ref 1–4.8)
LYMPHOCYTES NFR BLD: 59.8 % (ref 22–41)
MANUAL DIFF BLD: ABNORMAL
MCH RBC QN AUTO: 25.4 PG (ref 27–33)
MCHC RBC AUTO-ENTMCNC: 30.6 G/DL (ref 33.6–35)
MCV RBC AUTO: 82.9 FL (ref 81.4–97.8)
MICROCYTES BLD QL SMEAR: ABNORMAL
MONOCYTES # BLD AUTO: 0.16 K/UL (ref 0–0.85)
MONOCYTES NFR BLD AUTO: 6.3 % (ref 0–13.4)
MORPHOLOGY BLD-IMP: NORMAL
NEUTROPHILS # BLD AUTO: 0.56 K/UL (ref 2–7.15)
NEUTROPHILS NFR BLD: 7.1 % (ref 44–72)
NEUTS BAND NFR BLD MANUAL: 14.3 % (ref 0–10)
NRBC # BLD AUTO: 0 K/UL
NRBC BLD-RTO: 0 /100 WBC
PLATELET # BLD AUTO: 244 K/UL (ref 164–446)
PLATELET BLD QL SMEAR: NORMAL
PMV BLD AUTO: 9.2 FL (ref 9–12.9)
POTASSIUM SERPL-SCNC: 3.6 MMOL/L (ref 3.6–5.5)
RBC # BLD AUTO: 4.14 M/UL (ref 4.2–5.4)
RBC BLD AUTO: PRESENT
SIGNIFICANT IND 70042: NORMAL
SITE SITE: NORMAL
SMUDGE CELLS BLD QL SMEAR: NORMAL
SODIUM SERPL-SCNC: 140 MMOL/L (ref 135–145)
SOURCE SOURCE: NORMAL
VANCOMYCIN TROUGH SERPL-MCNC: 10.7 UG/ML (ref 10–20)
WBC # BLD AUTO: 2.6 K/UL (ref 4.8–10.8)

## 2018-06-15 PROCEDURE — 700102 HCHG RX REV CODE 250 W/ 637 OVERRIDE(OP): Performed by: INTERNAL MEDICINE

## 2018-06-15 PROCEDURE — A9270 NON-COVERED ITEM OR SERVICE: HCPCS | Performed by: FAMILY MEDICINE

## 2018-06-15 PROCEDURE — 80048 BASIC METABOLIC PNL TOTAL CA: CPT

## 2018-06-15 PROCEDURE — 700102 HCHG RX REV CODE 250 W/ 637 OVERRIDE(OP): Performed by: FAMILY MEDICINE

## 2018-06-15 PROCEDURE — 700111 HCHG RX REV CODE 636 W/ 250 OVERRIDE (IP): Performed by: INTERNAL MEDICINE

## 2018-06-15 PROCEDURE — 85007 BL SMEAR W/DIFF WBC COUNT: CPT

## 2018-06-15 PROCEDURE — 85027 COMPLETE CBC AUTOMATED: CPT

## 2018-06-15 PROCEDURE — A9270 NON-COVERED ITEM OR SERVICE: HCPCS | Performed by: INTERNAL MEDICINE

## 2018-06-15 PROCEDURE — 99233 SBSQ HOSP IP/OBS HIGH 50: CPT | Performed by: INTERNAL MEDICINE

## 2018-06-15 PROCEDURE — 80202 ASSAY OF VANCOMYCIN: CPT

## 2018-06-15 PROCEDURE — 700105 HCHG RX REV CODE 258: Performed by: HOSPITALIST

## 2018-06-15 PROCEDURE — 700102 HCHG RX REV CODE 250 W/ 637 OVERRIDE(OP): Performed by: HOSPITALIST

## 2018-06-15 PROCEDURE — A9270 NON-COVERED ITEM OR SERVICE: HCPCS | Performed by: HOSPITALIST

## 2018-06-15 PROCEDURE — 700111 HCHG RX REV CODE 636 W/ 250 OVERRIDE (IP): Performed by: HOSPITALIST

## 2018-06-15 PROCEDURE — 770020 HCHG ROOM/CARE - TELE (206)

## 2018-06-15 PROCEDURE — 36415 COLL VENOUS BLD VENIPUNCTURE: CPT

## 2018-06-15 PROCEDURE — 700101 HCHG RX REV CODE 250: Performed by: INTERNAL MEDICINE

## 2018-06-15 RX ORDER — FAMOTIDINE 20 MG/1
20 TABLET, FILM COATED ORAL 2 TIMES DAILY
Status: DISCONTINUED | OUTPATIENT
Start: 2018-06-15 | End: 2018-06-16 | Stop reason: HOSPADM

## 2018-06-15 RX ORDER — METRONIDAZOLE 500 MG/1
500 TABLET ORAL EVERY 8 HOURS
Status: DISCONTINUED | OUTPATIENT
Start: 2018-06-15 | End: 2018-06-16

## 2018-06-15 RX ADMIN — THERA TABS 1 TABLET: TAB at 06:06

## 2018-06-15 RX ADMIN — IBUPROFEN 600 MG: 600 TABLET, FILM COATED ORAL at 14:02

## 2018-06-15 RX ADMIN — METRONIDAZOLE 500 MG: 500 INJECTION, SOLUTION INTRAVENOUS at 01:22

## 2018-06-15 RX ADMIN — CEFEPIME 2 G: 2 INJECTION, POWDER, FOR SOLUTION INTRAMUSCULAR; INTRAVENOUS at 06:04

## 2018-06-15 RX ADMIN — VANCOMYCIN HYDROCHLORIDE 700 MG: 100 INJECTION, POWDER, LYOPHILIZED, FOR SOLUTION INTRAVENOUS at 06:05

## 2018-06-15 RX ADMIN — METRONIDAZOLE 500 MG: 500 TABLET ORAL at 14:02

## 2018-06-15 RX ADMIN — ACETAMINOPHEN 650 MG: 325 TABLET, FILM COATED ORAL at 07:55

## 2018-06-15 RX ADMIN — CEFEPIME 2 G: 2 INJECTION, POWDER, FOR SOLUTION INTRAMUSCULAR; INTRAVENOUS at 13:59

## 2018-06-15 RX ADMIN — IBUPROFEN 600 MG: 600 TABLET, FILM COATED ORAL at 23:48

## 2018-06-15 RX ADMIN — FAMOTIDINE 20 MG: 20 TABLET, FILM COATED ORAL at 17:45

## 2018-06-15 RX ADMIN — CEFEPIME 2 G: 2 INJECTION, POWDER, FOR SOLUTION INTRAMUSCULAR; INTRAVENOUS at 22:38

## 2018-06-15 RX ADMIN — TBO-FILGRASTIM 300 MCG: 300 INJECTION, SOLUTION SUBCUTANEOUS at 14:00

## 2018-06-15 RX ADMIN — POTASSIUM CHLORIDE AND SODIUM CHLORIDE: 900; 150 INJECTION, SOLUTION INTRAVENOUS at 01:25

## 2018-06-15 RX ADMIN — POTASSIUM CHLORIDE AND SODIUM CHLORIDE: 900; 150 INJECTION, SOLUTION INTRAVENOUS at 22:38

## 2018-06-15 RX ADMIN — METRONIDAZOLE 500 MG: 500 TABLET ORAL at 22:38

## 2018-06-15 RX ADMIN — METRONIDAZOLE 500 MG: 500 INJECTION, SOLUTION INTRAVENOUS at 10:12

## 2018-06-15 RX ADMIN — ONDANSETRON 4 MG: 4 TABLET, ORALLY DISINTEGRATING ORAL at 13:49

## 2018-06-15 ASSESSMENT — ENCOUNTER SYMPTOMS
FEVER: 0
BLURRED VISION: 0
COUGH: 0
HEADACHES: 0
SHORTNESS OF BREATH: 0
WEIGHT LOSS: 0
FALLS: 0
DEPRESSION: 0
EYE PAIN: 0
HEARTBURN: 0
WEAKNESS: 0
PALPITATIONS: 0
BACK PAIN: 0
CONSTIPATION: 0
PND: 0
NAUSEA: 1
NECK PAIN: 0
DIZZINESS: 0
SEIZURES: 0
DIARRHEA: 1
TREMORS: 0
VOMITING: 0
ABDOMINAL PAIN: 1
SPUTUM PRODUCTION: 0
WHEEZING: 0
CHILLS: 0
SPEECH CHANGE: 0

## 2018-06-15 ASSESSMENT — PAIN SCALES - GENERAL
PAINLEVEL_OUTOF10: 2
PAINLEVEL_OUTOF10: 6
PAINLEVEL_OUTOF10: 0
PAINLEVEL_OUTOF10: 4

## 2018-06-15 ASSESSMENT — LIFESTYLE VARIABLES: SUBSTANCE_ABUSE: 0

## 2018-06-15 NOTE — PROGRESS NOTES
Gi consult dictated:  Impression:  1. Terminal ileitis probably acute infectious although she has a cousin with Crohn's Disease.  2. Mid abdominal pain  3. Heartburn  4. Familial neutropenia  5. Microcytic anemia. She denies heavy menses.  Plan:  1. Continue supportive care  2. BID Pepcid  3. Check tTG and iron studies.  4. Will need further eval for Crohn's Disease is she doesn't clear symptoms.

## 2018-06-15 NOTE — CARE PLAN
Problem: Safety  Goal: Will remain free from falls  Outcome: PROGRESSING AS EXPECTED  Patient remains free from falls.     Problem: Bowel/Gastric:  Goal: Normal bowel function is maintained or improved  Outcome: PROGRESSING AS EXPECTED  Patient reports normal BM     Problem: Mobility  Goal: Risk for activity intolerance will decrease  Outcome: PROGRESSING AS EXPECTED  Patient up ad demario with assistance.

## 2018-06-15 NOTE — PROGRESS NOTES
Monitor Summary- SR 66-89. No ectopies. Patient had one episode of nonsustained giulia (49) at 2200. [place] 0.14 QRS 0.10 QT 0.40

## 2018-06-15 NOTE — PROGRESS NOTES
"Pharmacy Kinetics 27 y.o. female on vancomycin day # 2 6/15/2018    Currently on Vancomycin 700 mg iv q8hr (, , )     Indication for Treatment: febrile neutropenia     Pertinent history per medical record: Admitted on 2018 for evaluation of nausea, vomiting, and diarrhea. The patient states that she has been having these issues for approximately 2 days.  Nothing relieves or exacerbates her symptoms, and her pain has been constant since its onset. She lives with her family and none of them are ill at this time. 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  Appears low risk MRSA..     Other antibiotics: Cefepime 2 g IV q 8 hours     Allergies: Patient has no known allergies.      List concerns for renal function: IV contrast for imaging    Pertinent cultures to date:   18:PBCx2:NGTD  18:UR:NGTD    Recent Labs      06/13/18   2138  06/15/18   0539   WBC  1.0*  2.6*   NEUTSPOLYS  22.30*  7.10*   BANDSSTABS  1.80  14.30*     Recent Labs      06/13/18   2138  06/15/18   0539   BUN  13  4*   CREATININE  0.56  0.42*   ALBUMIN  4.9   --      Recent Labs      06/15/18   0539   VANCOTROUGH  10.7     Intake/Output Summary (Last 24 hours) at 06/15/18 0757  Last data filed at 18 2000   Gross per 24 hour   Intake             2440 ml   Output             2400 ml   Net               40 ml      Blood pressure (!) 97/61, pulse 62, temperature 36.2 °C (97.1 °F), resp. rate 17, height 1.499 m (4' 11\"), weight 59.9 kg (132 lb 0.9 oz), last menstrual period 2018, SpO2 95 %, not currently breastfeeding. Temp (24hrs), Av.6 °C (97.9 °F), Min:36.1 °C (96.9 °F), Max:37.3 °C (99.1 °F)    A/P        1. Vancomycin dose change: Vancomycin 1000 mg iv q8hr (0600 1400 2200)  2. Next vancomycin level: 18@1330  3. Goal trough: 16-20 mcg/mL  4. Comments: Familial netropenia,  ANC today ~ 560. Filgrastim given. Cx NGTD, afebrile, renal indices ok. Heme ONC following : Plan suspect that she will be able to be " discharged in a day or 2 if her cultures remain negative and she remains afebrile.    Vishal Vasquez PharmD BCPS

## 2018-06-15 NOTE — PROGRESS NOTES
Bedside report done. Assumed care of patient from Silvano RN. Patient verbalizes no needs at this time. No pain. A&Ox4. Stable. Call light within reach.

## 2018-06-15 NOTE — CONSULTS
DATE OF SERVICE:  06/15/2018    GI CONSULTATION    REFERRING PHYSICIAN:  Ese Orta MD.    REASON FOR CONSULTATION:  Nausea, vomiting, diarrhea, central abdominal pain   and ileitis on CT scan.    HISTORY OF PRESENT ILLNESS:  The patient is a pleasant 27-year-old woman   admitted 2 days ago with sudden onset of malaise, fever, chills, nausea,   vomiting, central abdominal pain and diarrhea.  She has a history of familial   neutropenia and she gets Neupogen to boost her white cell counts, only when   she has severe infections.  Symptoms came on after dinner on the 12th and she   felt poorly before she went to bed and then woke up with nausea, vomiting and   diarrhea.  She got a dose of Neupogen yesterday and today, and her white count   improved from 1.0 on admission to 2.6 today.  Currently, she states she is   feeling better, but she ate some sausage for breakfast, which gave her cramps   and diarrhea as well as heartburn this morning.  She is also concerned because   she has a cousin with Crohn's disease.  A CT scan showed enteritis involving   the terminal ileum.  She also has a microcytic anemia and she has been taking   iron.  She has an MCV of 82.  She denies heavy periods.    PAST MEDICAL HISTORY:  Familial neutropenia and history of iron deficiency   anemia.    PAST SURGICAL HISTORY:  She had a cholecystectomy in 12/20/16.    ALLERGIES:  No known drug allergies.    CURRENT MEDICATIONS:  Cefepime, Lovenox, metronidazole, Neupogen, Tylenol   p.r.n., ibuprofen p.r.n.  She also received pneumococcal vaccination.    SOCIAL HISTORY:  She lives with her boyfriend.  She has 2 children.    HABITS:  No tobacco or alcohol.    FAMILY HISTORY:  Her son, daughter and mother all have familial neutropenia.    There is no family history of colon or breast cancer.    REVIEW OF SYSTEMS:  CONSTITUTIONAL:  She is a little sweaty today, but denies any fevers or   chills.  PULMONARY:  Denies asthma, wheezing or cough.  NEUROLOGIC:   No problems.  PSYCHIATRIC:  No problems.  CARDIAC:  No chest pain or palpitations.  GASTROINTESTINAL:  She has been having some heartburn today after some sausage   this morning, she tends to get diarrhea if she eats greasy foods since she   had her cholecystectomy.  GENITOURINARY:  She reports rare heavy periods.    PHYSICAL EXAMINATION:  GENERAL:  Alert young female, in no distress.  VITAL SIGNS:  Temperature is 36.3, pulse 70, and blood pressure 93/57.  SKIN:  No skin lesions.  LYMPH NODES:  No nodes.  HEAD AND NECK:  Sclerae are anicteric.  Oropharynx is clear.  NECK:  Supple.  LUNGS:  Clear bilaterally.  HEART:  Regular rate and rhythm without murmur.  ABDOMEN:  Flat, bowel sounds are present.  She is mildly to moderately tender   in the mid epigastrium and right upper quadrant.  EXTREMITIES:  No edema.    LABORATORY DATA:  White count today 2.6, hemoglobin 10.7, hematocrit 34, MCV   83, platelets 244,000.  Sodium is 140, potassium 3.6, BUN 4, creatinine 0.42,   AST 22, ALT 22, and alk phos 72.    IMPRESSION:  1.  Terminal ileitis, probably acute, infectious, although she has a cousin   with Crohn's disease.  2.  Mid-abdominal pain.  3.  Heartburn.  4.  Familial neutropenia.  5.  Microcytic anemia.  She denies heavy periods.    PLAN:  1.  Continue supportive care and antibiotics which could probably be   discontinued when cultures come back negative.  2.  B.i.d. Pepcid.  3.  I will check a tissue transglutaminase to screen for celiac disease as   well as iron studies.  4.  She will need further evaluation for Crohn's disease if she does not clear   her symptoms.       ____________________________________     DIANE COLLADO MD    [facility] / NTS    DD:  06/15/2018 16:01:37  DT:  06/15/2018 16:52:42    D#:  [id number]  Job#:  [id number]

## 2018-06-15 NOTE — PROGRESS NOTES
Report received, assumed care, assessment done. Pt aware of plan of care. Will monitor. c/o HA, tylenol given.

## 2018-06-15 NOTE — PROGRESS NOTES
[facility] Hospitalist Progress Note    Date of Service: 6/15/2018    Chief Complaint  27 y.o. female admitted 2018 with nausea vomiting abdominal pain.  Patient does have familial leukopenia.  Patient was then treated with IV antibiotics for neutropenic fever and also Neupogen.  Patient responded to treatment however patient CT abdomen also concern of possible ileitis.  GI was consulted and recommend supportive care and twice daily Pepcid.  Patient also is recommended to check TTG to rule out celiac disease and also may need further evaluation for Crohn's disease.    Interval Problem Update  6/15 patient's leukopenia improved.  Continue treatment with Neupogen.  Continue antibiotics.  DC vancomycin. Patient otherwise denies fever, chills, nausea, vomiting, SOB, CP, headache, constipation, cough, or sputum.      Consultants/Specialty  Oncology, GI    Disposition  TBD        Review of Systems   Constitutional: Negative for chills, fever and weight loss.   HENT: Negative for congestion, ear discharge, ear pain, hearing loss and nosebleeds.    Eyes: Negative for blurred vision and pain.   Respiratory: Negative for cough, sputum production, shortness of breath and wheezing.    Cardiovascular: Negative for chest pain, palpitations, leg swelling and PND.   Gastrointestinal: Positive for abdominal pain, diarrhea and nausea. Negative for constipation, heartburn and vomiting.   Genitourinary: Negative for dysuria, frequency and hematuria.   Musculoskeletal: Negative for back pain, falls, joint pain and neck pain.   Skin: Negative for rash.   Neurological: Negative for dizziness, tremors, speech change, seizures, weakness and headaches.   Psychiatric/Behavioral: Negative for depression, substance abuse and suicidal ideas.      Physical Exam  Laboratory/Imaging   Hemodynamics  Temp (24hrs), Av.5 °C (97.7 °F), Min:36.1 °C (96.9 °F), Max:37.2 °C (99 °F)   Temperature: 36.3 °C (97.4 °F)  Pulse  Av.5  Min: 60  Max: 120    Blood Pressure: (!) 84/50      Respiratory      Respiration: 20, Pulse Oximetry: 95 %        RUL Breath Sounds: Clear, RML Breath Sounds: Clear, RLL Breath Sounds: Diminished, RADHA Breath Sounds: Clear, LLL Breath Sounds: Diminished    Fluids    Intake/Output Summary (Last 24 hours) at 06/15/18 1002  Last data filed at 06/14/18 2000   Gross per 24 hour   Intake             1320 ml   Output             2000 ml   Net             -680 ml       Nutrition  Orders Placed This Encounter   Procedures   • Diet Order     Standing Status:   Standing     Number of Occurrences:   1     Order Specific Question:   Diet:     Answer:   Regular [1]     Physical Exam   Constitutional: She is oriented to person, place, and time. She appears well-developed and well-nourished.   HENT:   Head: Normocephalic.   Nose: Nose normal.   Mouth/Throat: No oropharyngeal exudate.   Eyes: EOM are normal. Pupils are equal, round, and reactive to light.   Neck: Normal range of motion. Neck supple. No JVD present. No thyromegaly present.   Cardiovascular: Normal rate, regular rhythm and normal heart sounds.  Exam reveals no gallop and no friction rub.    No murmur heard.  Pulmonary/Chest: Effort normal and breath sounds normal. No respiratory distress. She has no wheezes. She has no rales.   Abdominal: Soft. Bowel sounds are normal. She exhibits no distension and no mass. There is tenderness. There is no rebound and no guarding.   Musculoskeletal: Normal range of motion. She exhibits no edema or tenderness.   Lymphadenopathy:     She has no cervical adenopathy.   Neurological: She is alert and oriented to person, place, and time. No cranial nerve deficit.   Skin: Skin is warm. No rash noted.   Psychiatric: Her behavior is normal.       Recent Labs      06/13/18   2138  06/15/18   0539   WBC  1.0*  2.6*   RBC  5.24  4.14*   HEMOGLOBIN  13.5  10.7*   HEMATOCRIT  42.3  34.1*   MCV  80.7*  82.9   MCH  25.8*  25.4*   MCHC  31.9*  30.6*   RDW  42.2  45.1    PLATELETCT  321  244   MPV  9.2  9.2     Recent Labs      06/13/18   2138  06/15/18   0539   SODIUM  137  140   POTASSIUM  3.2*  3.6   CHLORIDE  104  112   CO2  22  20   GLUCOSE  105*  95   BUN  13  4*   CREATININE  0.56  0.42*   CALCIUM  9.6  8.5                      Assessment/Plan     * Sepsis (HCC)- (present on admission)   Assessment & Plan    This is sepsis (without associated acute organ dysfunction).    We will put the patient on full sepsis protocol including lactic acid checks  ivf and iv abx  From ileitis  Culture pending          Neutropenic fever (HCC)- (present on admission)   Assessment & Plan    Recurrent issue, in setting of unclear familial neutropenia (parents, son and daughter all with the same).   Seen by oncologist in the hospital  Continue antibiotics cefepime, DC vancomycin  Start Neupogen  Slightly improved        Ileitis, terminal (HCC)- (present on admission)   Assessment & Plan    CT confirmed presence of a ileitis at her mental section, terminal  Etiology unclear, infectious versus inflammatory  GI is consulted  Recommend supportive care  If symptoms not improving may need to screening for Crohn's disease with colonoscopy        Hypokalemia- (present on admission)   Assessment & Plan    Replacement.         Abdominal pain- (present on admission)   Assessment & Plan    Evidence of severe enteritis on imaging.  Continue with IV antibiotics, IVF, monitor.              Quality-Core Measures   Reviewed items::  Labs reviewed, Medications reviewed and Radiology images reviewed  Cisneros catheter::  No Cisneros  DVT prophylaxis - mechanical:  SCDs  Ulcer Prophylaxis::  Not indicated  Antibiotics:  Treating active infection/contamination beyond 24 hours perioperative coverage   For complexity-based billing, please refer to the history, exam, and decison making above. In addition, I spent >35 minutes caring for the patient today. More than 50% of the time was spent counseling and coordinating  care.    I have discussed with RN and CM and SW and other consultants about patient's plan.

## 2018-06-16 ENCOUNTER — PATIENT OUTREACH (OUTPATIENT)
Dept: HEALTH INFORMATION MANAGEMENT | Facility: OTHER | Age: 28
End: 2018-06-16

## 2018-06-16 VITALS
OXYGEN SATURATION: 97 % | HEART RATE: 60 BPM | WEIGHT: 132.06 LBS | SYSTOLIC BLOOD PRESSURE: 96 MMHG | RESPIRATION RATE: 16 BRPM | TEMPERATURE: 97.9 F | DIASTOLIC BLOOD PRESSURE: 65 MMHG | BODY MASS INDEX: 26.62 KG/M2 | HEIGHT: 59 IN

## 2018-06-16 PROBLEM — A41.9 SEPSIS (HCC): Status: RESOLVED | Noted: 2017-01-14 | Resolved: 2018-06-16

## 2018-06-16 PROBLEM — D70.9 NEUTROPENIC FEVER (HCC): Status: RESOLVED | Noted: 2017-01-15 | Resolved: 2018-06-16

## 2018-06-16 PROBLEM — K50.00 ILEITIS, TERMINAL (HCC): Status: RESOLVED | Noted: 2018-06-15 | Resolved: 2018-06-16

## 2018-06-16 PROBLEM — R50.81 NEUTROPENIC FEVER (HCC): Status: RESOLVED | Noted: 2017-01-15 | Resolved: 2018-06-16

## 2018-06-16 LAB
ANION GAP SERPL CALC-SCNC: 7 MMOL/L (ref 0–11.9)
ANISOCYTOSIS BLD QL SMEAR: ABNORMAL
BASOPHILS # BLD AUTO: 0 % (ref 0–1.8)
BASOPHILS # BLD: 0 K/UL (ref 0–0.12)
BUN SERPL-MCNC: 4 MG/DL (ref 8–22)
CALCIUM SERPL-MCNC: 8.5 MG/DL (ref 8.5–10.5)
CHLORIDE SERPL-SCNC: 111 MMOL/L (ref 96–112)
CO2 SERPL-SCNC: 25 MMOL/L (ref 20–33)
CREAT SERPL-MCNC: 0.49 MG/DL (ref 0.5–1.4)
EOSINOPHIL # BLD AUTO: 0.39 K/UL (ref 0–0.51)
EOSINOPHIL NFR BLD: 9.6 % (ref 0–6.9)
ERYTHROCYTE [DISTWIDTH] IN BLOOD BY AUTOMATED COUNT: 43.6 FL (ref 35.9–50)
FERRITIN SERPL-MCNC: 72.9 NG/ML (ref 10–291)
GLUCOSE SERPL-MCNC: 86 MG/DL (ref 65–99)
HCT VFR BLD AUTO: 33.9 % (ref 37–47)
HGB BLD-MCNC: 10.7 G/DL (ref 12–16)
IRON SERPL-MCNC: 36 UG/DL (ref 40–170)
LYMPHOCYTES # BLD AUTO: 1.82 K/UL (ref 1–4.8)
LYMPHOCYTES NFR BLD: 44.3 % (ref 22–41)
MANUAL DIFF BLD: NORMAL
MCH RBC QN AUTO: 25.6 PG (ref 27–33)
MCHC RBC AUTO-ENTMCNC: 31.6 G/DL (ref 33.6–35)
MCV RBC AUTO: 81.1 FL (ref 81.4–97.8)
METAMYELOCYTES NFR BLD MANUAL: 0.9 %
MICROCYTES BLD QL SMEAR: ABNORMAL
MONOCYTES # BLD AUTO: 0.93 K/UL (ref 0–0.85)
MONOCYTES NFR BLD AUTO: 22.6 % (ref 0–13.4)
MORPHOLOGY BLD-IMP: NORMAL
NEUTROPHILS # BLD AUTO: 0.93 K/UL (ref 2–7.15)
NEUTROPHILS NFR BLD: 22.6 % (ref 44–72)
NRBC # BLD AUTO: 0 K/UL
NRBC BLD-RTO: 0 /100 WBC
OVALOCYTES BLD QL SMEAR: NORMAL
PLATELET # BLD AUTO: 248 K/UL (ref 164–446)
PLATELET BLD QL SMEAR: NORMAL
PMV BLD AUTO: 9.8 FL (ref 9–12.9)
POIKILOCYTOSIS BLD QL SMEAR: NORMAL
POTASSIUM SERPL-SCNC: 3.4 MMOL/L (ref 3.6–5.5)
RBC # BLD AUTO: 4.18 M/UL (ref 4.2–5.4)
RBC BLD AUTO: PRESENT
SMUDGE CELLS BLD QL SMEAR: NORMAL
SODIUM SERPL-SCNC: 143 MMOL/L (ref 135–145)
WBC # BLD AUTO: 4.1 K/UL (ref 4.8–10.8)

## 2018-06-16 PROCEDURE — 700102 HCHG RX REV CODE 250 W/ 637 OVERRIDE(OP): Performed by: INTERNAL MEDICINE

## 2018-06-16 PROCEDURE — 700111 HCHG RX REV CODE 636 W/ 250 OVERRIDE (IP): Performed by: HOSPITALIST

## 2018-06-16 PROCEDURE — 83516 IMMUNOASSAY NONANTIBODY: CPT

## 2018-06-16 PROCEDURE — 85007 BL SMEAR W/DIFF WBC COUNT: CPT

## 2018-06-16 PROCEDURE — 700105 HCHG RX REV CODE 258: Performed by: HOSPITALIST

## 2018-06-16 PROCEDURE — 82728 ASSAY OF FERRITIN: CPT

## 2018-06-16 PROCEDURE — 85027 COMPLETE CBC AUTOMATED: CPT

## 2018-06-16 PROCEDURE — 80048 BASIC METABOLIC PNL TOTAL CA: CPT

## 2018-06-16 PROCEDURE — A9270 NON-COVERED ITEM OR SERVICE: HCPCS | Performed by: INTERNAL MEDICINE

## 2018-06-16 PROCEDURE — 36415 COLL VENOUS BLD VENIPUNCTURE: CPT

## 2018-06-16 PROCEDURE — 700102 HCHG RX REV CODE 250 W/ 637 OVERRIDE(OP): Performed by: HOSPITALIST

## 2018-06-16 PROCEDURE — 82784 ASSAY IGA/IGD/IGG/IGM EACH: CPT

## 2018-06-16 PROCEDURE — 83540 ASSAY OF IRON: CPT

## 2018-06-16 PROCEDURE — A9270 NON-COVERED ITEM OR SERVICE: HCPCS | Performed by: HOSPITALIST

## 2018-06-16 PROCEDURE — 700105 HCHG RX REV CODE 258: Performed by: FAMILY MEDICINE

## 2018-06-16 PROCEDURE — 99239 HOSP IP/OBS DSCHRG MGMT >30: CPT | Performed by: INTERNAL MEDICINE

## 2018-06-16 RX ORDER — SODIUM CHLORIDE 9 MG/ML
500 INJECTION, SOLUTION INTRAVENOUS ONCE
Status: COMPLETED | OUTPATIENT
Start: 2018-06-16 | End: 2018-06-16

## 2018-06-16 RX ORDER — FAMOTIDINE 20 MG/1
20 TABLET, FILM COATED ORAL 2 TIMES DAILY
Qty: 60 TAB | Refills: 1 | Status: SHIPPED | OUTPATIENT
Start: 2018-06-16 | End: 2020-07-07

## 2018-06-16 RX ORDER — POTASSIUM CHLORIDE 20 MEQ/1
40 TABLET, EXTENDED RELEASE ORAL ONCE
Status: COMPLETED | OUTPATIENT
Start: 2018-06-16 | End: 2018-06-16

## 2018-06-16 RX ADMIN — FAMOTIDINE 20 MG: 20 TABLET, FILM COATED ORAL at 05:20

## 2018-06-16 RX ADMIN — CEFEPIME 2 G: 2 INJECTION, POWDER, FOR SOLUTION INTRAMUSCULAR; INTRAVENOUS at 05:20

## 2018-06-16 RX ADMIN — POTASSIUM CHLORIDE 40 MEQ: 1500 TABLET, EXTENDED RELEASE ORAL at 08:59

## 2018-06-16 RX ADMIN — METRONIDAZOLE 500 MG: 500 TABLET ORAL at 05:20

## 2018-06-16 RX ADMIN — SODIUM CHLORIDE 500 ML: 9 INJECTION, SOLUTION INTRAVENOUS at 05:19

## 2018-06-16 RX ADMIN — THERA TABS 1 TABLET: TAB at 05:20

## 2018-06-16 ASSESSMENT — ENCOUNTER SYMPTOMS
CONSTITUTIONAL NEGATIVE: 1
CARDIOVASCULAR NEGATIVE: 1
RESPIRATORY NEGATIVE: 1
ABDOMINAL PAIN: 1

## 2018-06-16 NOTE — PROGRESS NOTES
MEWS up to 4 from 2-3. Paged Dr. Gibson (hospitalist) @ [phone] Notified Dr. Gibson of MEWS score and hypotension. Order for 500 cc bolus recieved.

## 2018-06-16 NOTE — DISCHARGE SUMMARY
"Discharge Summary    CHIEF COMPLAINT ON ADMISSION  Chief Complaint   Patient presents with   • Nausea/Vomiting/Diarrhea     Pt states N/V/D for past two days.  Pt states \"large amounts\" of emesis and diarrhea.  Pt states associated chest wall pain in her sternum and bodies aches 7/10       Reason for Admission  N/V/D     Admission Date  6/13/2018    CODE STATUS  Full Code    HPI & HOSPITAL COURSE  This is a 27 y.o. female here with history of familial neutropenia presented with nausea vomiting diarrhea.  Apparently patient had history of familial neutropenia and has been followed by outpatient hematologist.  However patient has not seen them for a while.  Patient came in and was noticed to have possible signs of neutropenic fever patient was put on empiric antibiotics.  Patient's culture remains no growth to date.  Patient's fever resolved.  Patient was seen by oncologist and recommend to use Neupogen and patient's leukopenia improved.  Patient also showing signs of ileitis from CT scan.  GI was consulted and recommend continue follow-up during the hospital stay.  Patient's symptoms resolved.  Likely patient does have neutropenic ileitis.  Patient currently stable and GI recommend outpatient follow-up.  Patient is waiting to be discharged home.       Therefore, she is discharged in good and stable condition to home with close outpatient follow-up.    The patient met 2-midnight criteria for an inpatient stay at the time of discharge.    Discharge Date  6/16/2018    FOLLOW UP ITEMS POST DISCHARGE  none    DISCHARGE DIAGNOSES      Sepsis (HCC) POA: Yes    Neutropenic fever (HCC) POA: Yes    Abdominal pain POA: Yes    Hypokalemia POA: Yes    Ileitis, terminal (HCC) POA: Yes      FOLLOW UP  No future appointments.  Giuseppe Bravo P.A.-C.  [address]  [facility] [address]  [phone]    Go on 6/18/2018  Please arrive at 4:15pm for your 4:30pm appointment. Thank you    Eladio Ware M.D.  [address]  [place] [address]  [phone]    In 1 week  If symptoms worsen      MEDICATIONS ON DISCHARGE     Medication List      START taking these medications      Instructions   famotidine 20 MG Tabs  Commonly known as:  PEPCID   Take 1 Tab by mouth 2 Times a Day.  Dose:  20 mg        CONTINUE taking these medications      Instructions   MAGNESIUM PO   Take 1 Tab by mouth every day.  Dose:  1 Tab     multivitamin Tabs   Take 1 Tab by mouth every day.  Dose:  1 Tab        STOP taking these medications    NEXPLANON 68 MG Impl implant  Generic drug:  etonogestrel            Allergies  No Known Allergies    DIET  Orders Placed This Encounter   Procedures   • Diet Order     Standing Status:   Standing     Number of Occurrences:   1     Order Specific Question:   Diet:     Answer:   Regular [1]     Comments:   [place]     Order Specific Question:   Diet:     Answer:   2 Gram Sodium [7]       ACTIVITY  As tolerated.  Weight bearing as tolerated    CONSULTATIONS  GI  onc    PROCEDURES  None  CT-ABDOMEN-PELVIS WITH   Final Result         1.  Changes compatible with severe enteritis, involvement of the terminal ileum raises concern for inflammatory bowel disease.   2.  Hepatomegaly      DX-CHEST-PORTABLE (1 VIEW)   Final Result         1.  No acute cardiopulmonary disease.          PE:  Gen: AAOx3, NAD  Eyes: PELLA  Neck: no JVD, no lymphadenopathy  Cardia: RRR, no mrg  Lungs: CTAB, no rales, rhonci or wheezing  Abd: NABS, soft, non extended, no mass  EXT: No C/C/E, peripheral pulse 2+ b/l  Neuro: CN II-XII intact, non focal, reflex 2+ symmetrical  Skin: Intact, no lesion, warm  Psych: Appropriate.      LABORATORY  Lab Results   Component Value Date    SODIUM 143 06/16/2018    POTASSIUM 3.4 (L) 06/16/2018    CHLORIDE 111 06/16/2018    CO2 25 06/16/2018    GLUCOSE 86 06/16/2018    BUN 4 (L) 06/16/2018    CREATININE 0.49 (L) 06/16/2018        Lab Results   Component Value Date    WBC 4.1 (L) 06/16/2018    HEMOGLOBIN 10.7 (L)  06/16/2018    HEMATOCRIT 33.9 (L) 06/16/2018    PLATELETCT 248 06/16/2018        Total time of the discharge process exceeds 38 minutes.

## 2018-06-16 NOTE — DISCHARGE INSTRUCTIONS
Discharge Instructions    Discharged to home by car with relative. Discharged via wheelchair, hospital escort: Yes.  Special equipment needed: Not Applicable    Be sure to schedule a follow-up appointment with your primary care doctor or any specialists as instructed.     Discharge Plan:   Influenza Vaccine Indication: Patient Refuses (pt states they make her sick)    I understand that a diet low in cholesterol, fat, and sodium is recommended for good health. Unless I have been given specific instructions below for another diet, I accept this instruction as my diet prescription.   Other diet: Heart Healthy, Low Sodium, Low Cholesterol    Special Instructions: Sepsis, Adult  Sepsis is a serious infection of your blood or tissues that affects your whole body. The infection that causes sepsis may be bacterial, viral, fungal, or parasitic. Sepsis may be life threatening. Sepsis can cause your blood pressure to drop. This may result in shock. Shock causes your central nervous system and your organs to stop working correctly.  What increases the risk?  Sepsis can happen in anyone, but it is more likely to happen in people who have weakened immune systems.  What are the signs or symptoms?  Symptoms of sepsis can include:  · Fever or low body temperature (hypothermia).  · Rapid breathing (hyperventilation).  · Chills.  · Rapid heartbeat (tachycardia).  · Confusion or light-headedness.  · Trouble breathing.  · Urinating much less than usual.  · Cool, clammy skin or red, flushed skin.  · Other problems with the heart, kidneys, or brain.  How is this diagnosed?  Your health care provider will likely do tests to look for an infection, to see if the infection has spread to your blood, and to see how serious your condition is. Tests can include:  · Blood tests, including cultures of your blood.  · Cultures of other fluids from your body, such as:  ¨ Urine.  ¨ Pus from wounds.  ¨ Mucus coughed up from your lungs.  · Urine tests  other than cultures.  · X-ray exams or other imaging tests.  How is this treated?  Treatment will begin with elimination of the source of infection. If your sepsis is likely caused by a bacterial or fungal infection, you will be given antibiotic or antifungal medicines.  You may also receive:  · Oxygen.  · Fluids through an IV tube.  · Medicines to increase your blood pressure.  · A machine to clean your blood (dialysis) if your kidneys fail.  · A machine to help you breathe if your lungs fail.  Get help right away if:  You get an infection or develop any of the signs and symptoms of sepsis after surgery or a hospitalization.  This information is not intended to replace advice given to you by your health care provider. Make sure you discuss any questions you have with your health care provider.  Document Released: 09/15/2004 Document Revised: 05/25/2017 Document Reviewed: 08/25/2014  [employer] Interactive Patient Education © 2017 [employer]      · Is patient discharged on Warfarin / Coumadin?   No     Depression / Suicide Risk    As you are discharged from this [facility] facility, it is important to learn how to keep safe from harming yourself.    Recognize the warning signs:  · Abrupt changes in personality, positive or negative- including increase in energy   · Giving away possessions  · Change in eating patterns- significant weight changes-  positive or negative  · Change in sleeping patterns- unable to sleep or sleeping all the time   · Unwillingness or inability to communicate  · Depression  · Unusual sadness, discouragement and loneliness  · Talk of wanting to die  · Neglect of personal appearance   · Rebelliousness- reckless behavior  · Withdrawal from people/activities they love  · Confusion- inability to concentrate     If you or a loved one observes any of these behaviors or has concerns about self-harm, here's what you can do:  · Talk about it- your feelings and reasons for harming  yourself  · Remove any means that you might use to hurt yourself (examples: pills, rope, extension cords, firearm)  · Get professional help from the community (Mental Health, Substance Abuse, psychological counseling)  · Do not be alone:Call your Safe Contact- someone whom you trust who will be there for you.  · Call your local CRISIS HOTLINE 174-3772 or 029-432-0517  · Call your local Children's Mobile Crisis Response Team [place] (997) 897-0173 or www.[employer]  · Call the toll free National Suicide Prevention Hotlines   · National Suicide Prevention Lifeline 602-263-FJZX (9853)  · National Hope Line Network 800-SUICIDE (823-9809)

## 2018-06-16 NOTE — PROGRESS NOTES
Report received at bedside.  RN assumed care.  Chart reviewed.  Patient resting in bed. Updated plan of care. Patient verified on telemetry.  Call light in reach.  Bed in lowest position.  Non-slip socks on.

## 2018-06-16 NOTE — ASSESSMENT & PLAN NOTE
CT confirmed presence of a ileitis at her mental section, terminal  Etiology unclear, infectious versus inflammatory  GI is consulted  Recommend supportive care  If symptoms not improving may need to screening for Crohn's disease with colonoscopy

## 2018-06-16 NOTE — PROGRESS NOTES
Patient discharged. A&O x 4. Discharge instructions, personal belongings in possession of the patient. PIV and tele monitor removed.  Copy of discharge instructions in the patient chart, signed and reviewed. Patient verbalizes the understanding of the discharge instructions. Questions / concerns addressed prior to leaving the unit. Patient is instructed to follow up with PCP and GI. Transported via walking, refused transport. Patient is discharged to home. Family is present.

## 2018-06-16 NOTE — PROGRESS NOTES
Gastroenterology Progress Note     Author: Eladio Ware   Date & Time Created: 6/16/2018 8:20 AM    Chief Complaint:  Problem: Neutropenic ileitis    Interval History:  S: Minimal discomfort overnight with no fever. WBC up to 4.1. Tolerating PO. Thinks she's ok to go home today.     Review of Systems:  Review of Systems   Constitutional: Negative.    Respiratory: Negative.    Cardiovascular: Negative.    Gastrointestinal: Positive for abdominal pain.       Physical Exam:  Physical Exam   Constitutional: She is oriented to person, place, and time. She appears well-developed and well-nourished.   Cardiovascular: Normal rate, regular rhythm and normal heart sounds.    Pulmonary/Chest: Effort normal and breath sounds normal. No respiratory distress. She has no wheezes.   Abdominal: Soft. Bowel sounds are normal. She exhibits no distension. There is tenderness. There is no rebound and no guarding.   Minimal RLQ tenderness.    Musculoskeletal: Normal range of motion. She exhibits no edema.   Neurological: She is alert and oriented to person, place, and time.   Skin: Skin is warm and dry.   Psychiatric: She has a normal mood and affect.       Labs:        Invalid input(s): [id number]  Recent Labs      06/13/18 2138   TROPONINI  <0.01     Recent Labs      06/13/18   2138  06/15/18   0539  06/16/18   0306   SODIUM  137  140  143   POTASSIUM  3.2*  3.6  3.4*   CHLORIDE  104  112  111   CO2  22  20  25   BUN  13  4*  4*   CREATININE  0.56  0.42*  0.49*   CALCIUM  9.6  8.5  8.5     Recent Labs      06/13/18   2138  06/15/18   0539  06/16/18   0306   ALTSGPT  22   --    --    ASTSGOT  22   --    --    ALKPHOSPHAT  72   --    --    TBILIRUBIN  0.6   --    --    GLUCOSE  105*  95  86     Recent Labs      06/13/18   2138  06/15/18   0539  06/16/18   0306   RBC  5.24  4.14*  4.18*   HEMOGLOBIN  13.5  10.7*  10.7*   HEMATOCRIT  42.3  34.1*  33.9*   PLATELETCT  321  244  248   IRON   --    --   36*   FERRITIN   --    --    72.9     Recent Labs      18   2138  06/15/18   0539  18   0306   WBC  1.0*  2.6*  4.1*   NEUTSPOLYS  22.30*  7.10*  22.60*   LYMPHOCYTES  60.70*  59.80*  44.30*   MONOCYTES  12.50  6.30  22.60*   EOSINOPHILS  0.00  11.60*  9.60*   BASOPHILS  1.80  0.90  0.00   ASTSGOT  22   --    --    ALTSGPT  22   --    --    ALKPHOSPHAT  72   --    --    TBILIRUBIN  0.6   --    --      Hemodynamics:  Temp (24hrs), Av.1 °C (97 °F), Min:36 °C (96.8 °F), Max:36.3 °C (97.4 °F)  Temperature: 36 °C (96.8 °F)  Pulse  Av.6  Min: 60  Max: 120  Blood Pressure: (!) 92/52     Respiratory:    Respiration: 16, Pulse Oximetry: 95 %        RUL Breath Sounds: Clear, RML Breath Sounds: Clear, RLL Breath Sounds: Diminished, RADHA Breath Sounds: Clear, LLL Breath Sounds: Diminished  Fluids:    Intake/Output Summary (Last 24 hours) at 18 0820  Last data filed at 06/15/18 1800   Gross per 24 hour   Intake             1350 ml   Output                0 ml   Net             1350 ml        GI/Nutrition:  Orders Placed This Encounter   Procedures   • Diet Order     Standing Status:   Standing     Number of Occurrences:   1     Order Specific Question:   Diet:     Answer:   Regular [1]     Comments:   [place]     Order Specific Question:   Diet:     Answer:   2 Gram Sodium [7]     Medical Decision Making, by Problem:  There are no active hospital problems to display for this patient.  Impression:  1. Neutropenic ileitis. This is seen in myelodysplasia patients who develop neutropenia on chemo and her presentation could be a variant of that. With current improvement I think it's unlikely this is Crohn's.  2. Familial neutropenia    Plan:  1. OK for DC today from my perspective. She can follow up with me as needed if ongoing symptoms.     Quality-Core Measures   Reviewed items::  Labs reviewed and Medications reviewed

## 2018-06-16 NOTE — PROGRESS NOTES
Assumed care of patient from Wiley HOLLAND. Bedside report done. Patient verbalizes no complaints at this time. Call light within reach. Bed in low position. Will continue to monitor

## 2018-06-16 NOTE — CARE PLAN
Problem: Safety  Goal: Will remain free from falls  Outcome: PROGRESSING AS EXPECTED  Patient remains free from falls. Demonstrates use of call light and verbalizes understanding of fall precautions.     Problem: Knowledge Deficit  Goal: Knowledge of the prescribed therapeutic regimen will improve  Outcome: PROGRESSING AS EXPECTED  Patient verbalizes understanding of treatment plan.     Problem: Pain Management  Goal: Pain level will decrease to patient's comfort goal  Outcome: PROGRESSING AS EXPECTED  Patient's pain is well controlled with non pharmacological and pharmacological interventions.

## 2018-06-18 LAB
BACTERIA BLD CULT: NORMAL
IGA SERPL-MCNC: 320 MG/DL (ref 68–408)
SIGNIFICANT IND 70042: NORMAL
SITE SITE: NORMAL
SOURCE SOURCE: NORMAL

## 2018-06-19 LAB
BACTERIA BLD CULT: NORMAL
SIGNIFICANT IND 70042: NORMAL
SITE SITE: NORMAL
SOURCE SOURCE: NORMAL
TTG IGG SER IA-ACNC: 1 U/ML (ref 0–5)

## 2019-08-06 ENCOUNTER — APPOINTMENT (OUTPATIENT)
Dept: RADIOLOGY | Facility: MEDICAL CENTER | Age: 29
End: 2019-08-06
Attending: EMERGENCY MEDICINE
Payer: MEDICAID

## 2019-08-06 ENCOUNTER — HOSPITAL ENCOUNTER (EMERGENCY)
Facility: MEDICAL CENTER | Age: 29
End: 2019-08-06
Attending: EMERGENCY MEDICINE
Payer: MEDICAID

## 2019-08-06 VITALS
BODY MASS INDEX: 21.82 KG/M2 | RESPIRATION RATE: 16 BRPM | DIASTOLIC BLOOD PRESSURE: 76 MMHG | HEIGHT: 59 IN | OXYGEN SATURATION: 100 % | WEIGHT: 108.25 LBS | TEMPERATURE: 97.9 F | HEART RATE: 75 BPM | SYSTOLIC BLOOD PRESSURE: 109 MMHG

## 2019-08-06 DIAGNOSIS — N93.9 VAGINAL BLEEDING: ICD-10-CM

## 2019-08-06 DIAGNOSIS — O26.891 RH NEGATIVE, ANTEPARTUM, FIRST TRIMESTER: ICD-10-CM

## 2019-08-06 DIAGNOSIS — Z67.91 RH NEGATIVE, ANTEPARTUM, FIRST TRIMESTER: ICD-10-CM

## 2019-08-06 DIAGNOSIS — O03.9 MISCARRIAGE: ICD-10-CM

## 2019-08-06 LAB
ACTION RH IMMUNE GLOB 8505RHG: NORMAL
ALBUMIN SERPL BCP-MCNC: 4.7 G/DL (ref 3.2–4.9)
ALBUMIN/GLOB SERPL: 1.3 G/DL
ALP SERPL-CCNC: 53 U/L (ref 30–99)
ALT SERPL-CCNC: 7 U/L (ref 2–50)
ANION GAP SERPL CALC-SCNC: 9 MMOL/L (ref 0–11.9)
APPEARANCE UR: CLEAR
AST SERPL-CCNC: 11 U/L (ref 12–45)
B-HCG SERPL-ACNC: 16 MIU/ML (ref 0–5)
BACTERIA #/AREA URNS HPF: NEGATIVE /HPF
BACTERIA GENITAL QL WET PREP: NORMAL
BASOPHILS # BLD AUTO: 1.4 % (ref 0–1.8)
BASOPHILS # BLD: 0.05 K/UL (ref 0–0.12)
BILIRUB SERPL-MCNC: 0.4 MG/DL (ref 0.1–1.5)
BILIRUB UR QL STRIP.AUTO: NEGATIVE
BUN SERPL-MCNC: 9 MG/DL (ref 8–22)
CALCIUM SERPL-MCNC: 9.3 MG/DL (ref 8.5–10.5)
CHLORIDE SERPL-SCNC: 105 MMOL/L (ref 96–112)
CO2 SERPL-SCNC: 25 MMOL/L (ref 20–33)
COLOR UR: YELLOW
CREAT SERPL-MCNC: 0.5 MG/DL (ref 0.5–1.4)
EOSINOPHIL # BLD AUTO: 0.26 K/UL (ref 0–0.51)
EOSINOPHIL NFR BLD: 7.2 % (ref 0–6.9)
EPI CELLS #/AREA URNS HPF: NORMAL /HPF
ERYTHROCYTE [DISTWIDTH] IN BLOOD BY AUTOMATED COUNT: 42.8 FL (ref 35.9–50)
GLOBULIN SER CALC-MCNC: 3.7 G/DL (ref 1.9–3.5)
GLUCOSE SERPL-MCNC: 86 MG/DL (ref 65–99)
GLUCOSE UR STRIP.AUTO-MCNC: NEGATIVE MG/DL
HCG SERPL QL: POSITIVE
HCT VFR BLD AUTO: 40.7 % (ref 37–47)
HGB BLD-MCNC: 12.9 G/DL (ref 12–16)
HYALINE CASTS #/AREA URNS LPF: NORMAL /LPF
IMM GRANULOCYTES # BLD AUTO: 0.01 K/UL (ref 0–0.11)
IMM GRANULOCYTES NFR BLD AUTO: 0.3 % (ref 0–0.9)
KETONES UR STRIP.AUTO-MCNC: NEGATIVE MG/DL
LEUKOCYTE ESTERASE UR QL STRIP.AUTO: NEGATIVE
LYMPHOCYTES # BLD AUTO: 1.44 K/UL (ref 1–4.8)
LYMPHOCYTES NFR BLD: 40.1 % (ref 22–41)
MCH RBC QN AUTO: 27.4 PG (ref 27–33)
MCHC RBC AUTO-ENTMCNC: 31.7 G/DL (ref 33.6–35)
MCV RBC AUTO: 86.6 FL (ref 81.4–97.8)
MICRO URNS: ABNORMAL
MONOCYTES # BLD AUTO: 0.9 K/UL (ref 0–0.85)
MONOCYTES NFR BLD AUTO: 25.1 % (ref 0–13.4)
NEUTROPHILS # BLD AUTO: 0.93 K/UL (ref 2–7.15)
NEUTROPHILS NFR BLD: 25.9 % (ref 44–72)
NITRITE UR QL STRIP.AUTO: NEGATIVE
NRBC # BLD AUTO: 0 K/UL
NRBC BLD-RTO: 0 /100 WBC
NUMBER OF RH DOSES IND 8505RD: 1
PH UR STRIP.AUTO: 6 [PH] (ref 5–8)
PLATELET # BLD AUTO: 318 K/UL (ref 164–446)
PMV BLD AUTO: 9.2 FL (ref 9–12.9)
POTASSIUM SERPL-SCNC: 3.3 MMOL/L (ref 3.6–5.5)
PROT SERPL-MCNC: 8.4 G/DL (ref 6–8.2)
PROT UR QL STRIP: NEGATIVE MG/DL
RBC # BLD AUTO: 4.7 M/UL (ref 4.2–5.4)
RBC # URNS HPF: NORMAL /HPF
RBC UR QL AUTO: ABNORMAL
RH BLD: NORMAL
SIGNIFICANT IND 70042: NORMAL
SITE SITE: NORMAL
SODIUM SERPL-SCNC: 139 MMOL/L (ref 135–145)
SOURCE SOURCE: NORMAL
SP GR UR STRIP.AUTO: 1.01
UROBILINOGEN UR STRIP.AUTO-MCNC: 0.2 MG/DL
WBC # BLD AUTO: 3.6 K/UL (ref 4.8–10.8)
WBC #/AREA URNS HPF: NORMAL /HPF

## 2019-08-06 PROCEDURE — 36415 COLL VENOUS BLD VENIPUNCTURE: CPT

## 2019-08-06 PROCEDURE — 76801 OB US < 14 WKS SINGLE FETUS: CPT

## 2019-08-06 PROCEDURE — 96374 THER/PROPH/DIAG INJ IV PUSH: CPT

## 2019-08-06 PROCEDURE — 80053 COMPREHEN METABOLIC PANEL: CPT

## 2019-08-06 PROCEDURE — 99284 EMERGENCY DEPT VISIT MOD MDM: CPT

## 2019-08-06 PROCEDURE — 87591 N.GONORRHOEAE DNA AMP PROB: CPT

## 2019-08-06 PROCEDURE — 84702 CHORIONIC GONADOTROPIN TEST: CPT

## 2019-08-06 PROCEDURE — 85025 COMPLETE CBC W/AUTO DIFF WBC: CPT

## 2019-08-06 PROCEDURE — 87491 CHLMYD TRACH DNA AMP PROBE: CPT

## 2019-08-06 PROCEDURE — 84703 CHORIONIC GONADOTROPIN ASSAY: CPT

## 2019-08-06 PROCEDURE — 81001 URINALYSIS AUTO W/SCOPE: CPT

## 2019-08-06 PROCEDURE — 86901 BLOOD TYPING SEROLOGIC RH(D): CPT

## 2019-08-06 ASSESSMENT — ENCOUNTER SYMPTOMS
HEADACHES: 0
FEVER: 0
BACK PAIN: 1
SHORTNESS OF BREATH: 0
VOMITING: 0

## 2019-08-06 NOTE — ED NOTES
Pt discharged with s/o. Follow up with ob, call for appointment. Pt to return to ed with worsening symptoms.

## 2019-08-06 NOTE — ED PROVIDER NOTES
ED Provider Note    ED Provider Note    Primary care provider: KIARA Marques  Means of arrival: POV  History obtained from: Patient  History limited by: None    CHIEF COMPLAINT  Chief Complaint   Patient presents with   • Vaginal Bleeding       HPI  Vanessa Ballesteros is a 28 y.o. female who presents to the Emergency Department with a chief complaint of abdominal cramping particularly on the right side with vaginal bleeding that started this morning.  Patient's last menstrual period was June 27.  She believes herself to be about 5 almost 6 weeks pregnant.  This is her fourth pregnancy.  She has 2 children she is had one miscarriage.  She is tried to get into see her gynecologist but has not yet been able to make an appointment.  She sees Dr. James at [facility].  Patient denies chest pain or shortness of breath.  No fever.  No dysuria.  She does state that she is had some abnormal discharge.  She is also concerned because she is Rh- and knows she needs to receive RhoGam.    REVIEW OF SYSTEMS  Review of Systems   Constitutional: Negative for fever.   HENT: Negative for congestion.    Respiratory: Negative for shortness of breath.    Gastrointestinal: Negative for vomiting.   Genitourinary: Negative for dysuria.   Musculoskeletal: Positive for back pain.   Neurological: Negative for headaches.   All other systems reviewed and are negative.      PAST MEDICAL HISTORY   has a past medical history of Microcytic anemia (2/14/2016).    SURGICAL HISTORY   has a past surgical history that includes jose a by laparoscopy (12/20/2016) and other abdominal surgery.    SOCIAL HISTORY  Social History     Tobacco Use   • Smoking status: Former Smoker     Packs/day: 0.25     Years: 2.00     Pack years: 0.50     Types: Cigarettes   • Smokeless tobacco: Never Used   • Tobacco comment: quit 12/12   Substance Use Topics   • Alcohol use: No   • Drug use: No      Social History     Substance and Sexual Activity   Drug Use No  "      FAMILY HISTORY  Family History   Problem Relation Age of Onset   • Heart Attack Mother    • Stroke Mother    • Hypertension Maternal Grandmother    • Diabetes Maternal Grandfather    • Cancer Paternal Grandmother         lung cancer        CURRENT MEDICATIONS  Home Medications    **Home medications have not yet been reviewed for this encounter**         ALLERGIES  No Known Allergies    PHYSICAL EXAM  VITAL SIGNS: /76   Pulse 75   Temp 36.6 °C (97.9 °F)   Resp 16   Ht 1.499 m (4' 11\")   Wt 49.1 kg (108 lb 3.9 oz)   SpO2 100%   BMI 21.86 kg/m²   Vitals reviewed.  Constitutional: Patient is oriented to person, place, and time. Appears well-developed and well-nourished. No distress.    Head: Normocephalic and atraumatic.   Ears: Normal external ears bilaterally.   Mouth/Throat: Oropharynx is clear and moist  Eyes: Conjunctivae are normal. Pupils are equal, round, and reactive to light.   Neck: Normal range of motion. Neck supple.  Cardiovascular: Normal rate, regular rhythm and normal heart sounds.   Pulmonary/Chest: Effort normal and breath sounds normal. No respiratory distress, no wheezes, rhonchi, or rales.   Abdominal: Soft. Bowel sounds are normal. There is very mild tenderness to the lower abd L > R. No rebound or guarding, or peritoneal signs. No CVA tenderness.  Pelvic: No abnormal discharge.  No masses or blood in the vaginal vault.  Os is closed.  Musculoskeletal: No edema and no tenderness.   Neurological: No focal deficits.   Skin: Skin is warm and dry. No erythema. No pallor.   Psychiatric: Patient has a normal mood and affect.     LABS  Results for orders placed or performed during the hospital encounter of 08/06/19   CBC with Differential   Result Value Ref Range    WBC 3.6 (L) 4.8 - 10.8 K/uL    RBC 4.70 4.20 - 5.40 M/uL    Hemoglobin 12.9 12.0 - 16.0 g/dL    Hematocrit 40.7 37.0 - 47.0 %    MCV 86.6 81.4 - 97.8 fL    MCH 27.4 27.0 - 33.0 pg    MCHC 31.7 (L) 33.6 - 35.0 g/dL    RDW " 42.8 35.9 - 50.0 fL    Platelet Count 318 164 - 446 K/uL    MPV 9.2 9.0 - 12.9 fL    Neutrophils-Polys 25.90 (L) 44.00 - 72.00 %    Lymphocytes 40.10 22.00 - 41.00 %    Monocytes 25.10 (H) 0.00 - 13.40 %    Eosinophils 7.20 (H) 0.00 - 6.90 %    Basophils 1.40 0.00 - 1.80 %    Immature Granulocytes 0.30 0.00 - 0.90 %    Nucleated RBC 0.00 /100 WBC    Neutrophils (Absolute) 0.93 (L) 2.00 - 7.15 K/uL    Lymphs (Absolute) 1.44 1.00 - 4.80 K/uL    Monos (Absolute) 0.90 (H) 0.00 - 0.85 K/uL    Eos (Absolute) 0.26 0.00 - 0.51 K/uL    Baso (Absolute) 0.05 0.00 - 0.12 K/uL    Immature Granulocytes (abs) 0.01 0.00 - 0.11 K/uL    NRBC (Absolute) 0.00 K/uL   Comp Metabolic Panel   Result Value Ref Range    Sodium 139 135 - 145 mmol/L    Potassium 3.3 (L) 3.6 - 5.5 mmol/L    Chloride 105 96 - 112 mmol/L    Co2 25 20 - 33 mmol/L    Anion Gap 9.0 0.0 - 11.9    Glucose 86 65 - 99 mg/dL    Bun 9 8 - 22 mg/dL    Creatinine 0.50 0.50 - 1.40 mg/dL    Calcium 9.3 8.5 - 10.5 mg/dL    AST(SGOT) 11 (L) 12 - 45 U/L    ALT(SGPT) 7 2 - 50 U/L    Alkaline Phosphatase 53 30 - 99 U/L    Total Bilirubin 0.4 0.1 - 1.5 mg/dL    Albumin 4.7 3.2 - 4.9 g/dL    Total Protein 8.4 (H) 6.0 - 8.2 g/dL    Globulin 3.7 (H) 1.9 - 3.5 g/dL    A-G Ratio 1.3 g/dL   HCG Qual Serum   Result Value Ref Range    Beta-Hcg Qualitative Serum Positive (A) Negative   URINALYSIS,CULTURE IF INDICATED   Result Value Ref Range    Color Yellow     Character Clear     Specific Gravity 1.007 <1.035    Ph 6.0 5.0 - 8.0    Glucose Negative Negative mg/dL    Ketones Negative Negative mg/dL    Protein Negative Negative mg/dL    Bilirubin Negative Negative    Urobilinogen, Urine 0.2 Negative    Nitrite Negative Negative    Leukocyte Esterase Negative Negative    Occult Blood Large (A) Negative    Micro Urine Req Microscopic    URINE MICROSCOPIC (W/UA)   Result Value Ref Range    WBC 0-2 /hpf    RBC 0-2 /hpf    Bacteria Negative None /hpf    Epithelial Cells Few /hpf    Hyaline Cast  0-2 /lpf   ESTIMATED GFR   Result Value Ref Range    GFR If African American >60 >60 mL/min/1.73 m 2    GFR If Non African American >60 >60 mL/min/1.73 m 2   BETA-HCG QUANTITATIVE SERUM   Result Value Ref Range    Bhcg 16.0 (H) 0.0 - 5.0 mIU/mL   CHLAMYDIA & GC BY PCR   Result Value Ref Range    C. trachomatis by PCR Negative Negative    N. gonorrhoeae by PCR Negative Negative    Source Vaginal    RH TYPE FOR RHOGAM FROM E.D.   Result Value Ref Range    Emergency Department Rh Typing NEG     Number Of Rh Doses Indicated 1    WET PREP   Result Value Ref Range    Significant Indicator NEG     Source GEN     Site -     Wet Prep For Parasites       No yeast.  No motile Trichomonas seen.  No clue cells seen.     ACTION: RH IMMUNE GLOBULIN   Result Value Ref Range    Action  Rh Immune Globulin [id number]'      issued       1 Syrng        All labs reviewed by me.    RADIOLOGY  US-OB 1ST TRIMESTER WITH TRANSVAGINAL (COMBO)   Final Result      No intrauterine gestation. This may be due to early stages of gestation, gestation of unknown location or missed . Consider follow-up with serial beta-hCGs and another pelvic ultrasound.        The radiologist's interpretation of all radiological studies have been reviewed by me.    COURSE & MEDICAL DECISION MAKING  Pertinent Labs & Imaging studies reviewed. (See chart for details)    Obtained and reviewed past medical records. Last encounter was in 2018. Patient was admitted for enteritis, N/V/D. History of familial neutropenia.    11:53 AM - Patient seen and examined at bedside.  Patient presents with lower abdominal, pelvic cramping with vaginal bleeding in the setting of early pregnancy.  Patient will be treated with RhoGam as she is Rh-. Ordered ultrasound, beta quant, UA and wet mount to evaluate her symptoms.     The differential diagnoses include but are not limited to: Vaginitis, threatened miscarriage, miscarriage, UTI    11:45 AM patient's reevaluated the  bedside.  We discussed ultrasound findings which show an empty uterus.  Awaiting beta quantitative hCG.  I explained, that she likely has miscarried.  The beta quantitative hCG will help clarify this.  However, it is possible that she is early in the pregnancy.  She is advised to continue follow-up with Dr. James as planned.  Patient is quite upset about this US result.  Awaiting RhoGam, also awaiting wet mount results.    Patient was reevaluated at the bedside.  Wet mount negative.  Awaiting RhoGam.  We spoke at length, about miscarriage.  Patient was concerned about the ability to carry a pregnancy in the future.  Of advised that she follow-up with her OB/GYN as planned despite what appears to be a miscarriage at this time.  Patient will be discharged home in stable condition.    FINAL IMPRESSION  1. Vaginal bleeding    2. Miscarriage    3. Rh negative, antepartum, first trimester

## 2019-08-06 NOTE — ED TRIAGE NOTES
Pt c/o vaginal bleeding approx 5 weeks pregnant. Pt concerned because hx of miscarriages, RH - and concerned she needs RHOgam.

## 2019-08-07 LAB
C TRACH DNA SPEC QL NAA+PROBE: NEGATIVE
N GONORRHOEA DNA SPEC QL NAA+PROBE: NEGATIVE
SPECIMEN SOURCE: NORMAL

## 2020-03-25 ENCOUNTER — HOSPITAL ENCOUNTER (EMERGENCY)
Facility: MEDICAL CENTER | Age: 30
End: 2020-03-25
Attending: EMERGENCY MEDICINE
Payer: MEDICAID

## 2020-03-25 VITALS
BODY MASS INDEX: 23.64 KG/M2 | HEART RATE: 69 BPM | DIASTOLIC BLOOD PRESSURE: 55 MMHG | OXYGEN SATURATION: 97 % | TEMPERATURE: 97.4 F | RESPIRATION RATE: 16 BRPM | SYSTOLIC BLOOD PRESSURE: 93 MMHG | HEIGHT: 59 IN | WEIGHT: 117.28 LBS

## 2020-03-25 DIAGNOSIS — E87.6 HYPOKALEMIA: ICD-10-CM

## 2020-03-25 DIAGNOSIS — R19.7 DIARRHEA, UNSPECIFIED TYPE: ICD-10-CM

## 2020-03-25 LAB
ALBUMIN SERPL BCP-MCNC: 4.6 G/DL (ref 3.2–4.9)
ALBUMIN/GLOB SERPL: 1.2 G/DL
ALP SERPL-CCNC: 48 U/L (ref 30–99)
ALT SERPL-CCNC: 11 U/L (ref 2–50)
ANION GAP SERPL CALC-SCNC: 14 MMOL/L (ref 7–16)
APPEARANCE UR: CLEAR
AST SERPL-CCNC: 12 U/L (ref 12–45)
B-HCG SERPL-ACNC: ABNORMAL MIU/ML (ref 0–5)
BASOPHILS # BLD AUTO: 0.7 % (ref 0–1.8)
BASOPHILS # BLD: 0.03 K/UL (ref 0–0.12)
BILIRUB SERPL-MCNC: 0.3 MG/DL (ref 0.1–1.5)
BILIRUB UR QL STRIP.AUTO: NEGATIVE
BUN SERPL-MCNC: 12 MG/DL (ref 8–22)
CALCIUM SERPL-MCNC: 9.6 MG/DL (ref 8.5–10.5)
CHLORIDE SERPL-SCNC: 103 MMOL/L (ref 96–112)
CO2 SERPL-SCNC: 19 MMOL/L (ref 20–33)
COLOR UR: YELLOW
CREAT SERPL-MCNC: 0.43 MG/DL (ref 0.5–1.4)
EOSINOPHIL # BLD AUTO: 0.09 K/UL (ref 0–0.51)
EOSINOPHIL NFR BLD: 2.2 % (ref 0–6.9)
ERYTHROCYTE [DISTWIDTH] IN BLOOD BY AUTOMATED COUNT: 41.2 FL (ref 35.9–50)
GLOBULIN SER CALC-MCNC: 3.9 G/DL (ref 1.9–3.5)
GLUCOSE SERPL-MCNC: 79 MG/DL (ref 65–99)
GLUCOSE UR STRIP.AUTO-MCNC: NEGATIVE MG/DL
HCT VFR BLD AUTO: 38.3 % (ref 37–47)
HGB BLD-MCNC: 12.8 G/DL (ref 12–16)
IMM GRANULOCYTES # BLD AUTO: 0.01 K/UL (ref 0–0.11)
IMM GRANULOCYTES NFR BLD AUTO: 0.2 % (ref 0–0.9)
KETONES UR STRIP.AUTO-MCNC: NEGATIVE MG/DL
LEUKOCYTE ESTERASE UR QL STRIP.AUTO: NEGATIVE
LYMPHOCYTES # BLD AUTO: 2.01 K/UL (ref 1–4.8)
LYMPHOCYTES NFR BLD: 49.9 % (ref 22–41)
MCH RBC QN AUTO: 28 PG (ref 27–33)
MCHC RBC AUTO-ENTMCNC: 33.4 G/DL (ref 33.6–35)
MCV RBC AUTO: 83.8 FL (ref 81.4–97.8)
MICRO URNS: NORMAL
MONOCYTES # BLD AUTO: 0.88 K/UL (ref 0–0.85)
MONOCYTES NFR BLD AUTO: 21.8 % (ref 0–13.4)
NEUTROPHILS # BLD AUTO: 1.01 K/UL (ref 2–7.15)
NEUTROPHILS NFR BLD: 25.2 % (ref 44–72)
NITRITE UR QL STRIP.AUTO: NEGATIVE
NRBC # BLD AUTO: 0 K/UL
NRBC BLD-RTO: 0 /100 WBC
PH UR STRIP.AUTO: 7 [PH] (ref 5–8)
PLATELET # BLD AUTO: 314 K/UL (ref 164–446)
PMV BLD AUTO: 8.9 FL (ref 9–12.9)
POTASSIUM SERPL-SCNC: 3.4 MMOL/L (ref 3.6–5.5)
PROT SERPL-MCNC: 8.5 G/DL (ref 6–8.2)
PROT UR QL STRIP: NEGATIVE MG/DL
RBC # BLD AUTO: 4.57 M/UL (ref 4.2–5.4)
RBC UR QL AUTO: NEGATIVE
SODIUM SERPL-SCNC: 136 MMOL/L (ref 135–145)
SP GR UR STRIP.AUTO: 1
UROBILINOGEN UR STRIP.AUTO-MCNC: 0.2 MG/DL
WBC # BLD AUTO: 4 K/UL (ref 4.8–10.8)

## 2020-03-25 PROCEDURE — 81003 URINALYSIS AUTO W/O SCOPE: CPT

## 2020-03-25 PROCEDURE — 80053 COMPREHEN METABOLIC PANEL: CPT

## 2020-03-25 PROCEDURE — 700111 HCHG RX REV CODE 636 W/ 250 OVERRIDE (IP): Performed by: EMERGENCY MEDICINE

## 2020-03-25 PROCEDURE — 85025 COMPLETE CBC W/AUTO DIFF WBC: CPT

## 2020-03-25 PROCEDURE — 700105 HCHG RX REV CODE 258: Performed by: EMERGENCY MEDICINE

## 2020-03-25 PROCEDURE — 84702 CHORIONIC GONADOTROPIN TEST: CPT

## 2020-03-25 PROCEDURE — 99284 EMERGENCY DEPT VISIT MOD MDM: CPT

## 2020-03-25 RX ORDER — ONDANSETRON 4 MG/1
4 TABLET, ORALLY DISINTEGRATING ORAL ONCE
Status: COMPLETED | OUTPATIENT
Start: 2020-03-25 | End: 2020-03-25

## 2020-03-25 RX ORDER — SODIUM CHLORIDE, SODIUM LACTATE, POTASSIUM CHLORIDE, CALCIUM CHLORIDE 600; 310; 30; 20 MG/100ML; MG/100ML; MG/100ML; MG/100ML
1000 INJECTION, SOLUTION INTRAVENOUS ONCE
Status: COMPLETED | OUTPATIENT
Start: 2020-03-25 | End: 2020-03-25

## 2020-03-25 RX ORDER — ONDANSETRON 4 MG/1
4 TABLET, ORALLY DISINTEGRATING ORAL EVERY 6 HOURS PRN
Qty: 15 TAB | Refills: 0 | Status: SHIPPED | OUTPATIENT
Start: 2020-03-25 | End: 2020-07-07

## 2020-03-25 RX ADMIN — ONDANSETRON 4 MG: 4 TABLET, ORALLY DISINTEGRATING ORAL at 13:47

## 2020-03-25 RX ADMIN — SODIUM CHLORIDE, POTASSIUM CHLORIDE, SODIUM LACTATE AND CALCIUM CHLORIDE 1000 ML: 600; 310; 30; 20 INJECTION, SOLUTION INTRAVENOUS at 13:41

## 2020-03-25 ASSESSMENT — ENCOUNTER SYMPTOMS
COUGH: 0
BACK PAIN: 0
VOMITING: 0
SHORTNESS OF BREATH: 0
ABDOMINAL PAIN: 0
DIARRHEA: 1
BLOOD IN STOOL: 0
SORE THROAT: 0
HEADACHES: 0
FEVER: 0

## 2020-03-25 ASSESSMENT — LIFESTYLE VARIABLES: DO YOU DRINK ALCOHOL: NO

## 2020-03-25 ASSESSMENT — FIBROSIS 4 INDEX: FIB4 SCORE: 0.37

## 2020-03-25 NOTE — ED NOTES
Patient discharged to home with spouse. Patient alert and oriented x 4, ambulatory with steady gait. Patient verbalizes understanding of discharge instructions, follow up care w/ OBGYN, return precautions, and prescription x 1. Patient denies questions at time of discharge.

## 2020-03-25 NOTE — ED TRIAGE NOTES
.  Chief Complaint   Patient presents with   • Diarrhea     5 days   • Nausea   • Pregnancy     9 weeks    ambulated to triage sent by ob for evaluation of diarrhea x 5 days.

## 2020-03-25 NOTE — ED NOTES
ERP at bedside to review results, plan of care, and follow up with patient.     IVF bolus initiated and patient may be discharged s/p IVF bolus per ERP.

## 2020-03-25 NOTE — ED NOTES
"Patient is  sent at direction of her OBGYN for evaluation of diarrhea and nausea vomiting. Patient states having BM 2-3 times per hour, vomiting 1-2 times per day, primarily in the mornings. Patient states she is taking \"vegan friendly\" pre- vitamin, but is unsure if it has iron as an ingredient. States stool is watery.    Denies any vaginal bleeding or spotting. States some clear discharge. Denies abdominal pain or cramping.  "

## 2020-03-25 NOTE — ED PROVIDER NOTES
ED Provider Note    ED Provider Note    Primary care provider: KIARA Marques  Means of arrival: POV  History obtained from: Patient  History limited by: None    CHIEF COMPLAINT  Chief Complaint   Patient presents with   • Diarrhea     5 days   • Nausea   • Pregnancy     9 weeks        HPI  Vanessa Ballesteros is a 29 y.o. female who presents to the Emergency Department with her partner with a chief complaint of diarrhea.  Patient has had diarrhea for the last 5 days.  It started last week approximately Sunday, she had it for 2 days and then it resolved and returned on Friday of last week and now she has had it for 5 days.  She denies any fever.  No vaginal bleeding.  No abdominal pain.  She is approximately 9 weeks pregnant based on ultrasound done with her OB/GYN, Dr. James.  She has had 2 prior miscarriages.  Has been having morning sickness and this has not changed since the onset of her diarrhea.  The symptoms.  Today, she is been drinking copious amounts of fluid.  She contacted her gynecologist who recommended that she come for evaluation.  Patient was reluctant as she has a history of neutropenia and then given the current    REVIEW OF SYSTEMS  Review of Systems   Constitutional: Negative for fever.   HENT: Negative for congestion and sore throat.    Respiratory: Negative for cough and shortness of breath.    Cardiovascular: Negative for chest pain.   Gastrointestinal: Positive for diarrhea. Negative for abdominal pain, blood in stool and vomiting.   Genitourinary: Negative for dysuria.   Musculoskeletal: Negative for back pain.   Neurological: Negative for headaches.   All other systems reviewed and are negative.      PAST MEDICAL HISTORY   has a past medical history of Microcytic anemia (2/14/2016).    SURGICAL HISTORY   has a past surgical history that includes jose a by laparoscopy (12/20/2016) and other abdominal surgery.    SOCIAL HISTORY  Social History     Tobacco Use   • Smoking status: Former  "Smoker     Packs/day: 0.25     Years: 2.00     Pack years: 0.50     Types: Cigarettes   • Smokeless tobacco: Never Used   • Tobacco comment: quit 12/12   Substance Use Topics   • Alcohol use: No   • Drug use: No      Social History     Substance and Sexual Activity   Drug Use No       FAMILY HISTORY  Family History   Problem Relation Age of Onset   • Heart Attack Mother    • Stroke Mother    • Hypertension Maternal Grandmother    • Diabetes Maternal Grandfather    • Cancer Paternal Grandmother         lung cancer        CURRENT MEDICATIONS  Home Medications     Reviewed by Diana Sims R.N. (Registered Nurse) on 03/25/20 at 1209  Med List Status: <None>   Medication Last Dose Status   famotidine (PEPCID) 20 MG Tab  Active   MAGNESIUM PO  Active   multivitamin (THERAGRAN) Tab  Active                ALLERGIES  No Known Allergies    PHYSICAL EXAM  VITAL SIGNS: BP (!) 93/55   Pulse 69   Temp 36.3 °C (97.4 °F) (Temporal)   Resp 16   Ht 1.499 m (4' 11\")   Wt 53.2 kg (117 lb 4.6 oz)   SpO2 97%   BMI 23.69 kg/m²   Vitals reviewed.  Constitutional: Patient is oriented to person, place, and time. Appears well-developed and well-nourished. No distress.    Head: Normocephalic and atraumatic.   Ears: Normal external ears bilaterally.   Mouth/Throat: Oropharynx is clear   Eyes: Conjunctivae are normal.  Neck: Normal range of motion.  Cardiovascular: Normal rate, regular rhythm and normal heart sounds. Normal peripheral pulses, bilateral UE.  Pulmonary/Chest: Effort normal and breath sounds normal. No respiratory distress, no wheezes, rhonchi, or rales.  Abdominal: Soft. Bowel sounds are normal. There is no tenderness. No rebound or guarding, or peritoneal signs.   Musculoskeletal: No edema and no tenderness.   Lymphadenopathy: No cervical adenopathy.   Neurological: No focal deficits.   Skin: Skin is warm and dry. No erythema. No pallor.   Psychiatric: Patient has a normal mood and affect.     LABS  Results for " orders placed or performed during the hospital encounter of 03/25/20   CBC WITH DIFFERENTIAL   Result Value Ref Range    WBC 4.0 (L) 4.8 - 10.8 K/uL    RBC 4.57 4.20 - 5.40 M/uL    Hemoglobin 12.8 12.0 - 16.0 g/dL    Hematocrit 38.3 37.0 - 47.0 %    MCV 83.8 81.4 - 97.8 fL    MCH 28.0 27.0 - 33.0 pg    MCHC 33.4 (L) 33.6 - 35.0 g/dL    RDW 41.2 35.9 - 50.0 fL    Platelet Count 314 164 - 446 K/uL    MPV 8.9 (L) 9.0 - 12.9 fL    Neutrophils-Polys 25.20 (L) 44.00 - 72.00 %    Lymphocytes 49.90 (H) 22.00 - 41.00 %    Monocytes 21.80 (H) 0.00 - 13.40 %    Eosinophils 2.20 0.00 - 6.90 %    Basophils 0.70 0.00 - 1.80 %    Immature Granulocytes 0.20 0.00 - 0.90 %    Nucleated RBC 0.00 /100 WBC    Neutrophils (Absolute) 1.01 (L) 2.00 - 7.15 K/uL    Lymphs (Absolute) 2.01 1.00 - 4.80 K/uL    Monos (Absolute) 0.88 (H) 0.00 - 0.85 K/uL    Eos (Absolute) 0.09 0.00 - 0.51 K/uL    Baso (Absolute) 0.03 0.00 - 0.12 K/uL    Immature Granulocytes (abs) 0.01 0.00 - 0.11 K/uL    NRBC (Absolute) 0.00 K/uL   Comp Metabolic Panel   Result Value Ref Range    Sodium 136 135 - 145 mmol/L    Potassium 3.4 (L) 3.6 - 5.5 mmol/L    Chloride 103 96 - 112 mmol/L    Co2 19 (L) 20 - 33 mmol/L    Anion Gap 14.0 7.0 - 16.0    Glucose 79 65 - 99 mg/dL    Bun 12 8 - 22 mg/dL    Creatinine 0.43 (L) 0.50 - 1.40 mg/dL    Calcium 9.6 8.5 - 10.5 mg/dL    AST(SGOT) 12 12 - 45 U/L    ALT(SGPT) 11 2 - 50 U/L    Alkaline Phosphatase 48 30 - 99 U/L    Total Bilirubin 0.3 0.1 - 1.5 mg/dL    Albumin 4.6 3.2 - 4.9 g/dL    Total Protein 8.5 (H) 6.0 - 8.2 g/dL    Globulin 3.9 (H) 1.9 - 3.5 g/dL    A-G Ratio 1.2 g/dL   HCG QUANTITATIVE   Result Value Ref Range    Bhcg 30077.0 (H) 0.0 - 5.0 mIU/mL   URINALYSIS,CULTURE IF INDICATED   Result Value Ref Range    Color Yellow     Character Clear     Specific Gravity 1.002 <1.035    Ph 7.0 5.0 - 8.0    Glucose Negative Negative mg/dL    Ketones Negative Negative mg/dL    Protein Negative Negative mg/dL    Bilirubin Negative  "Negative    Urobilinogen, Urine 0.2 Negative    Nitrite Negative Negative    Leukocyte Esterase Negative Negative    Occult Blood Negative Negative    Micro Urine Req see below    ESTIMATED GFR   Result Value Ref Range    GFR If African American >60 >60 mL/min/1.73 m 2    GFR If Non African American >60 >60 mL/min/1.73 m 2       All labs reviewed by me.    COURSE & MEDICAL DECISION MAKING  Pertinent Labs & Imaging studies reviewed. (See chart for details)    Obtained and reviewed past medical records.  Patient's last encounter was in August of last year she was seen by myself for vaginal bleeding.  Patient was noted to be approximately 6 weeks pregnant at that time.  She reported being A1    12:32 PM - Patient seen and examined at bedside.  This is a pleasant 29-year-old female who presents with diarrhea and pregnancy.  She is Rh- but she is not having any abdominal pain or vaginal bleeding.  No leakage of fluid.  She presents on the recommendation of her PCP for possible dehydration.  She has had what she describes as \"morning sickness, throughout her pregnancy, this has not changed.  An IV was established, labs drawn per nursing protocols.  Differentials include are not limited to electrolyte disturbance, dehydration, gastroenteritis, UTI.     1:45 PM patient's reevaluated the bedside.  No new complaints.  She is not had any diarrhea or vomiting here in the department.  IV fluids are infusing which she was given for history of diarrhea with low bicarbonate and low potassium.  After this, I anticipate, she can discharge to home.  She is given anti-medics because she is complaining of nausea.  She will be discharged home with Zofran.  She is advised to follow-up with her on this week.  She is given strict return precautions.    FINAL IMPRESSION  1. Diarrhea, unspecified type    2. Hypokalemia                     "

## 2020-06-26 ENCOUNTER — HOSPITAL ENCOUNTER (OUTPATIENT)
Facility: MEDICAL CENTER | Age: 30
End: 2020-06-26
Attending: OBSTETRICS & GYNECOLOGY | Admitting: OBSTETRICS & GYNECOLOGY
Payer: MEDICAID

## 2020-06-26 ENCOUNTER — APPOINTMENT (OUTPATIENT)
Dept: RADIOLOGY | Facility: MEDICAL CENTER | Age: 30
End: 2020-06-26
Attending: OBSTETRICS & GYNECOLOGY
Payer: MEDICAID

## 2020-06-26 VITALS
HEART RATE: 89 BPM | TEMPERATURE: 98.1 F | DIASTOLIC BLOOD PRESSURE: 58 MMHG | SYSTOLIC BLOOD PRESSURE: 100 MMHG | WEIGHT: 120 LBS | BODY MASS INDEX: 24.19 KG/M2 | RESPIRATION RATE: 12 BRPM | HEIGHT: 59 IN | OXYGEN SATURATION: 95 %

## 2020-06-26 LAB
APPEARANCE UR: CLEAR
COLOR UR AUTO: YELLOW
CRYSTALS AMN MICRO: NORMAL
GLUCOSE UR QL STRIP.AUTO: NEGATIVE MG/DL
KETONES UR QL STRIP.AUTO: NEGATIVE MG/DL
LEUKOCYTE ESTERASE UR QL STRIP.AUTO: NEGATIVE
NITRITE UR QL STRIP.AUTO: NEGATIVE
PH UR STRIP.AUTO: 7 [PH] (ref 5–8)
PROT UR QL STRIP: NEGATIVE MG/DL
RBC UR QL AUTO: NEGATIVE
SP GR UR STRIP.AUTO: 1.02 (ref 1–1.03)

## 2020-06-26 PROCEDURE — 89060 EXAM SYNOVIAL FLUID CRYSTALS: CPT

## 2020-06-26 PROCEDURE — 81002 URINALYSIS NONAUTO W/O SCOPE: CPT

## 2020-06-26 PROCEDURE — 76815 OB US LIMITED FETUS(S): CPT

## 2020-06-26 ASSESSMENT — FIBROSIS 4 INDEX: FIB4 SCORE: 0.32

## 2020-06-27 NOTE — PROGRESS NOTES
GIBRAN 11/3/2020  [place]  21w3d\    Arrived to labor unit c/o slight LOF since last noght on and off. Denies VB, denies UCs, reports +FM.     - Doppler heart tones 155 with audibile movement   Sterile spec done, KASIA collected and sent. POC urine collected   - NEG FERN - call to Dr. Berry. Left VM   - US ordered to check fluid level. OB limited.   - US at bedside.   - Call to Dr. Berry. US all WNL. Pt ok to be discharged home.  labor precautions discussed. Pt ambulatory off unit with all personal belongings.

## 2020-07-07 ENCOUNTER — HOSPITAL ENCOUNTER (EMERGENCY)
Facility: MEDICAL CENTER | Age: 30
End: 2020-07-07
Attending: EMERGENCY MEDICINE
Payer: MEDICAID

## 2020-07-07 VITALS
TEMPERATURE: 97 F | RESPIRATION RATE: 16 BRPM | HEART RATE: 82 BPM | WEIGHT: 127.21 LBS | SYSTOLIC BLOOD PRESSURE: 91 MMHG | DIASTOLIC BLOOD PRESSURE: 58 MMHG | OXYGEN SATURATION: 98 % | BODY MASS INDEX: 25.69 KG/M2

## 2020-07-07 DIAGNOSIS — K04.7: ICD-10-CM

## 2020-07-07 PROCEDURE — 99282 EMERGENCY DEPT VISIT SF MDM: CPT

## 2020-07-07 RX ORDER — AMOXICILLIN 500 MG/1
500 CAPSULE ORAL 3 TIMES DAILY
Qty: 21 CAP | Refills: 0 | Status: SHIPPED | OUTPATIENT
Start: 2020-07-07 | End: 2020-07-14

## 2020-07-07 ASSESSMENT — FIBROSIS 4 INDEX: FIB4 SCORE: 0.32

## 2020-07-07 NOTE — ED PROVIDER NOTES
ED Provider Note    ER PROVIDER NOTE    CHIEF COMPLAINT  Chief Complaint   Patient presents with   • Facial Pain     right side, x3days   • Facial Droop       HPI  Vanessa Ballesteros is a 29 y.o. female who presents to the emergency department complaining of facial and dental pain.  Patient reports she has had the pain over the last 3 days, it is just to the right side of her face and upper, she states she has had some dental work done back in November, and has had some intermittent episodes with continued dental pain.  Denies any swelling or fevers or chills.  No visual symptoms or eye pain.  No headaches.  He does note states facial droop, patient states this is just the pain in the area and she is noted no drooping or numbness    Patient is 5 months pregnant    REVIEW OF SYSTEMS  Pertinent positives include dental pain. Pertinent negatives include no fever or headache. See HPI for details. All other systems reviewed and are negative.    PAST MEDICAL HISTORY   has a past medical history of Ileitis, terminal (HCC) and Microcytic anemia (2/14/2016).    SURGICAL HISTORY   has a past surgical history that includes jose a by laparoscopy (12/20/2016) and other abdominal surgery.    FAMILY HISTORY  Family History   Problem Relation Age of Onset   • Heart Attack Mother    • Stroke Mother    • Hypertension Maternal Grandmother    • Diabetes Maternal Grandfather    • Cancer Paternal Grandmother         lung cancer        SOCIAL HISTORY  Social History     Socioeconomic History   • Marital status: Single     Spouse name: Not on file   • Number of children: Not on file   • Years of education: Not on file   • Highest education level: Not on file   Occupational History   • Not on file   Social Needs   • Financial resource strain: Not on file   • Food insecurity     Worry: Not on file     Inability: Not on file   • Transportation needs     Medical: Not on file     Non-medical: Not on file   Tobacco Use   • Smoking status: Former  Smoker     Packs/day: 0.25     Years: 2.00     Pack years: 0.50     Types: Cigarettes   • Smokeless tobacco: Never Used   • Tobacco comment: quit 12/12   Substance and Sexual Activity   • Alcohol use: No   • Drug use: No   • Sexual activity: Yes     Partners: Male     Comment: none    Lifestyle   • Physical activity     Days per week: Not on file     Minutes per session: Not on file   • Stress: Not on file   Relationships   • Social connections     Talks on phone: Not on file     Gets together: Not on file     Attends [employer] service: Not on file     Active member of club or organization: Not on file     Attends meetings of clubs or organizations: Not on file     Relationship status: Not on file   • Intimate partner violence     Fear of current or ex partner: Not on file     Emotionally abused: Not on file     Physically abused: Not on file     Forced sexual activity: Not on file   Other Topics Concern   • Not on file   Social History Narrative   • Not on file      Social History     Substance and Sexual Activity   Drug Use No       CURRENT MEDICATIONS  Home Medications     Reviewed by Angie Toney R.N. (Registered Nurse) on 07/07/20 at 1303  Med List Status: Complete   Medication Last Dose Status        Patient Arley Taking any Medications                       ALLERGIES  No Known Allergies    PHYSICAL EXAM  VITAL SIGNS: /60   Pulse 87   Temp 36.1 °C (96.9 °F) (Temporal)   Resp 16   Wt 57.7 kg (127 lb 3.3 oz)   SpO2 97%   BMI 25.69 kg/m²   Pulse ox interpretation: I interpret this pulse ox as normal.    Constitutional: Alert in no apparent distress.  HENT: No signs of trauma, Bilateral external ears normal, Nose normal.  No trismus, multiple areas of dental decay, she has some tenderness to percussion over the first premolar on the upper right with some associated dental decay, slight swelling at the apex, no fluctuance, no facial swelling or erythema  Eyes: Pupils are equal and reactive,  Conjunctiva normal, Non-icteric.   Neck: Normal range of motion, No tenderness, Supple, No stridor.   Lymphatic: No lymphadenopathy noted.   Cardiovascular: Regular rate and rhythm, no murmurs.   Thorax & Lungs: Normal breath sounds, No respiratory distress, No wheezing, No chest tenderness.   Abdomen: Bowel sounds normal, Soft, No tenderness, No masses, No pulsatile masses. No peritoneal signs.  Skin: Warm, Dry, No erythema, No rash.   Musculoskeletal: Good range of motion in all major joints. No tenderness to palpation or major deformities noted.   Neurologic: Alert , radial nerves II through XII are intact normal motor function, Normal sensory function, No focal deficits noted.   Psychiatric: Affect normal, Judgment normal, Mood normal.     DIAGNOSTIC STUDIES / PROCEDURES      RADIOLOGY  No orders to display     The radiologist's interpretation of all radiological studies have been reviewed by me.    COURSE & MEDICAL DECISION MAKING  Nursing notes, VS, PMSFHx reviewed in chart.    1:18 PM Patient seen and examined at bedside.  Discussed findings with the patient, plan for discharge        Decision Making:  This is a 29 y.o. female with facial and dental pain, likely early apical abscess.  She will be started on amoxicillin, Tylenol for pain as she is pregnant, and she will follow-up with her dentist.  No e/o ANUG as no oral lesions/bleeding or gingival inflammation, Doubt abscess/deep space with no swelling and no pronounced ttp,no facial cellulitis on exam and no systemic sx (f/c/n/v), no sinus ttp to suggest sinus thrombus or CN palsy and no other emergent condition identified.     The patient will return for new or worsening symptoms and is stable at the time of discharge.    The patient is referred to a primary physician for blood pressure management, diabetic screening, and for all other preventative health concerns.    DISPOSITION:  Patient will be discharged home in stable condition.    FOLLOW UP:  With  your dentist    In 1 week        OUTPATIENT MEDICATIONS:  New Prescriptions    AMOXICILLIN (AMOXIL) 500 MG CAP    Take 1 Cap by mouth 3 times a day for 7 days.         FINAL IMPRESSION  1. Abscess, apical          The note accurately reflects work and decisions made by me.  Kevin Lo M.D.  7/7/2020  1:53 PM

## 2020-07-07 NOTE — ED TRIAGE NOTES
Chief Complaint   Patient presents with   • Facial Pain     right side, x3days   • Facial Droop     Pt ambulated to triage with above complaints x3days, no other neuro deficit.   Pt 23wks pregnant and pt said that she is neutropenic unknown reason.

## 2020-08-06 ENCOUNTER — APPOINTMENT (OUTPATIENT)
Dept: RADIOLOGY | Facility: MEDICAL CENTER | Age: 30
End: 2020-08-06
Attending: EMERGENCY MEDICINE
Payer: MEDICAID

## 2020-08-06 ENCOUNTER — HOSPITAL ENCOUNTER (EMERGENCY)
Facility: MEDICAL CENTER | Age: 30
End: 2020-08-06
Attending: EMERGENCY MEDICINE
Payer: MEDICAID

## 2020-08-06 VITALS
OXYGEN SATURATION: 97 % | RESPIRATION RATE: 16 BRPM | BODY MASS INDEX: 26.67 KG/M2 | HEIGHT: 59 IN | TEMPERATURE: 98.3 F | SYSTOLIC BLOOD PRESSURE: 97 MMHG | HEART RATE: 84 BPM | WEIGHT: 132.28 LBS | DIASTOLIC BLOOD PRESSURE: 52 MMHG

## 2020-08-06 DIAGNOSIS — J06.9 UPPER RESPIRATORY TRACT INFECTION, UNSPECIFIED TYPE: ICD-10-CM

## 2020-08-06 DIAGNOSIS — D70.9 NEUTROPENIA, UNSPECIFIED TYPE (HCC): ICD-10-CM

## 2020-08-06 LAB
ALBUMIN SERPL BCP-MCNC: 3.8 G/DL (ref 3.2–4.9)
ALBUMIN/GLOB SERPL: 1.1 G/DL
ALP SERPL-CCNC: 130 U/L (ref 30–99)
ALT SERPL-CCNC: 21 U/L (ref 2–50)
ANION GAP SERPL CALC-SCNC: 17 MMOL/L (ref 7–16)
AST SERPL-CCNC: 15 U/L (ref 12–45)
BASOPHILS # BLD AUTO: 0.9 % (ref 0–1.8)
BASOPHILS # BLD: 0.02 K/UL (ref 0–0.12)
BILIRUB SERPL-MCNC: <0.2 MG/DL (ref 0.1–1.5)
BUN SERPL-MCNC: 4 MG/DL (ref 8–22)
CALCIUM SERPL-MCNC: 9 MG/DL (ref 8.5–10.5)
CHLORIDE SERPL-SCNC: 102 MMOL/L (ref 96–112)
CO2 SERPL-SCNC: 19 MMOL/L (ref 20–33)
COVID ORDER STATUS COVID19: NORMAL
CREAT SERPL-MCNC: 0.27 MG/DL (ref 0.5–1.4)
EKG IMPRESSION: NORMAL
EOSINOPHIL # BLD AUTO: 0.18 K/UL (ref 0–0.51)
EOSINOPHIL NFR BLD: 8.4 % (ref 0–6.9)
ERYTHROCYTE [DISTWIDTH] IN BLOOD BY AUTOMATED COUNT: 42.9 FL (ref 35.9–50)
GLOBULIN SER CALC-MCNC: 3.6 G/DL (ref 1.9–3.5)
GLUCOSE SERPL-MCNC: 76 MG/DL (ref 65–99)
HCG SERPL QL: POSITIVE
HCT VFR BLD AUTO: 33.9 % (ref 37–47)
HGB BLD-MCNC: 10.8 G/DL (ref 12–16)
LYMPHOCYTES # BLD AUTO: 1.3 K/UL (ref 1–4.8)
LYMPHOCYTES NFR BLD: 58.9 % (ref 22–41)
MANUAL DIFF BLD: NORMAL
MCH RBC QN AUTO: 27.3 PG (ref 27–33)
MCHC RBC AUTO-ENTMCNC: 31.9 G/DL (ref 33.6–35)
MCV RBC AUTO: 85.6 FL (ref 81.4–97.8)
METAMYELOCYTES NFR BLD MANUAL: 0.9 %
MONOCYTES # BLD AUTO: 0.47 K/UL (ref 0–0.85)
MONOCYTES NFR BLD AUTO: 21.5 % (ref 0–13.4)
MORPHOLOGY BLD-IMP: NORMAL
NEUTROPHILS # BLD AUTO: 0.21 K/UL (ref 2–7.15)
NEUTROPHILS NFR BLD: 7.5 % (ref 44–72)
NEUTS BAND NFR BLD MANUAL: 1.9 % (ref 0–10)
NRBC # BLD AUTO: 0 K/UL
NRBC BLD-RTO: 0 /100 WBC
PLATELET # BLD AUTO: 290 K/UL (ref 164–446)
PLATELET BLD QL SMEAR: NORMAL
PMV BLD AUTO: 8.7 FL (ref 9–12.9)
POTASSIUM SERPL-SCNC: 4 MMOL/L (ref 3.6–5.5)
PROT SERPL-MCNC: 7.4 G/DL (ref 6–8.2)
RBC # BLD AUTO: 3.96 M/UL (ref 4.2–5.4)
RBC BLD AUTO: NORMAL
SARS-COV-2 RNA RESP QL NAA+PROBE: DETECTED
SODIUM SERPL-SCNC: 138 MMOL/L (ref 135–145)
SPECIMEN SOURCE: ABNORMAL
WBC # BLD AUTO: 2.2 K/UL (ref 4.8–10.8)

## 2020-08-06 PROCEDURE — 80053 COMPREHEN METABOLIC PANEL: CPT

## 2020-08-06 PROCEDURE — 93005 ELECTROCARDIOGRAM TRACING: CPT

## 2020-08-06 PROCEDURE — 84703 CHORIONIC GONADOTROPIN ASSAY: CPT

## 2020-08-06 PROCEDURE — 85027 COMPLETE CBC AUTOMATED: CPT

## 2020-08-06 PROCEDURE — 71045 X-RAY EXAM CHEST 1 VIEW: CPT

## 2020-08-06 PROCEDURE — 99285 EMERGENCY DEPT VISIT HI MDM: CPT

## 2020-08-06 PROCEDURE — 700111 HCHG RX REV CODE 636 W/ 250 OVERRIDE (IP): Performed by: EMERGENCY MEDICINE

## 2020-08-06 PROCEDURE — 85007 BL SMEAR W/DIFF WBC COUNT: CPT

## 2020-08-06 PROCEDURE — U0003 INFECTIOUS AGENT DETECTION BY NUCLEIC ACID (DNA OR RNA); SEVERE ACUTE RESPIRATORY SYNDROME CORONAVIRUS 2 (SARS-COV-2) (CORONAVIRUS DISEASE [COVID-19]), AMPLIFIED PROBE TECHNIQUE, MAKING USE OF HIGH THROUGHPUT TECHNOLOGIES AS DESCRIBED BY CMS-2020-01-R: HCPCS

## 2020-08-06 PROCEDURE — 93005 ELECTROCARDIOGRAM TRACING: CPT | Performed by: EMERGENCY MEDICINE

## 2020-08-06 PROCEDURE — 36415 COLL VENOUS BLD VENIPUNCTURE: CPT

## 2020-08-06 PROCEDURE — C9803 HOPD COVID-19 SPEC COLLECT: HCPCS | Performed by: EMERGENCY MEDICINE

## 2020-08-06 PROCEDURE — 700105 HCHG RX REV CODE 258: Performed by: EMERGENCY MEDICINE

## 2020-08-06 PROCEDURE — 96365 THER/PROPH/DIAG IV INF INIT: CPT

## 2020-08-06 RX ADMIN — CEFTRIAXONE SODIUM 1 G: 1 INJECTION, POWDER, FOR SOLUTION INTRAMUSCULAR; INTRAVENOUS at 19:21

## 2020-08-06 ASSESSMENT — FIBROSIS 4 INDEX: FIB4 SCORE: 0.32

## 2020-08-06 ASSESSMENT — LIFESTYLE VARIABLES: DO YOU DRINK ALCOHOL: NO

## 2020-08-06 ASSESSMENT — ENCOUNTER SYMPTOMS
FEVER: 0
COUGH: 1
CHILLS: 0

## 2020-08-06 NOTE — ED NOTES
"Pt arrives to room, a&ox4, changed into gown, tearful- \"I am scared to death to be here.\"  Chart up for ERP evaluation.   "

## 2020-08-06 NOTE — ED PROVIDER NOTES
"ED Provider Note    Scribed for Aba Gonsalez M.D. by Macho Rose. 8/6/2020, 4:32 PM.    Primary care provider: KIARA Marques  Means of arrival: walk-in  History obtained from: patient  History limited by: none    CHIEF COMPLAINT  Chief Complaint   Patient presents with   • Shortness of Breath     with deep breath and exertion.   • Congestion     \"Small\" cough       PPE Note: I personally donned full PPE for all patient encounters during this visit, including being clean-shaven with an N95 respirator mask, gloves, and eye protection. Scribe remained outside the patient's room and did not have any contact with the patient for the duration of patient encounter.       HPI  Vanessa Ballesteros is a 29 y.o. female who presents to the Emergency Department with complaints of mild shortness of breath over the past week. She states that she started feeling a mild, dry cough 5 days ago. This then progressed to nasal congestion. Her cough has since resolved. No alleviating or exacerbating factors noted. Patient is afebrile. Patient is concerned as she has a history of neutropenia.    REVIEW OF SYSTEMS  Review of Systems   Constitutional: Negative for chills and fever.   HENT: Positive for congestion.    Respiratory: Positive for cough (resolved).    All other systems reviewed and are negative.      PAST MEDICAL HISTORY   has a past medical history of Ileitis, terminal (HCC) and Microcytic anemia (2/14/2016).    SURGICAL HISTORY   has a past surgical history that includes jose a by laparoscopy (12/20/2016) and other abdominal surgery.    SOCIAL HISTORY  Social History     Tobacco Use   • Smoking status: Former Smoker     Packs/day: 0.25     Years: 2.00     Pack years: 0.50     Types: Cigarettes   • Smokeless tobacco: Never Used   • Tobacco comment: quit 12/12   Substance Use Topics   • Alcohol use: No   • Drug use: No      Social History     Substance and Sexual Activity   Drug Use No       FAMILY HISTORY  Family " "History   Problem Relation Age of Onset   • Heart Attack Mother    • Stroke Mother    • Hypertension Maternal Grandmother    • Diabetes Maternal Grandfather    • Cancer Paternal Grandmother         lung cancer        CURRENT MEDICATIONS  Home Medications     Reviewed by Estefania Yan R.N. (Registered Nurse) on 08/06/20 at 1600  Med List Status: Partial   Medication Last Dose Status        Patient Arley Taking any Medications                       ALLERGIES  No Known Allergies    PHYSICAL EXAM  VITAL SIGNS: /65   Pulse 96   Temp 36.6 °C (97.9 °F) (Temporal)   Resp 17   Ht 1.499 m (4' 11\")   Wt 60 kg (132 lb 4.4 oz)   SpO2 98%   BMI 26.72 kg/m²     Constitutional:  Mild distress, tearful  HENT: Nasal congestion, Moist mucous membranes  Eyes:  No conjunctivitis or icterus  Neck:  trachea is midline, no palpable thyroid  Lymphatic:  No cervical lymphadenopathy  Cardiovascular:  Regular rate and rhythm, no murmurs  Thorax & Lungs:  Normal breath sounds, no rhonchi  Abdomen:  Soft, Non-tender  Skin:.  no rash  Back:  Non-tender, no CVA tenderness  Extremities:   no edema  Vascular:  symmetric radial pulse  Neurologic:  Normal gross motor    LABS  Labs Reviewed   CBC WITH DIFFERENTIAL - Abnormal; Notable for the following components:       Result Value    WBC 2.2 (*)     RBC 3.96 (*)     Hemoglobin 10.8 (*)     Hematocrit 33.9 (*)     MCHC 31.9 (*)     MPV 8.7 (*)     Neutrophils-Polys 7.50 (*)     Lymphocytes 58.90 (*)     Monocytes 21.50 (*)     Eosinophils 8.40 (*)     Neutrophils (Absolute) 0.21 (*)     All other components within normal limits    Narrative:     Droplet, Contact, and Eye Protection   COMP METABOLIC PANEL - Abnormal; Notable for the following components:    Co2 19 (*)     Anion Gap 17.0 (*)     Bun 4 (*)     Creatinine 0.27 (*)     Alkaline Phosphatase 130 (*)     Globulin 3.6 (*)     All other components within normal limits    Narrative:     Droplet, Contact, and Eye Protection   HCG QUAL " SERUM - Abnormal; Notable for the following components:    Beta-Hcg Qualitative Serum Positive (*)     All other components within normal limits    Narrative:     Droplet, Contact, and Eye Protection   COVID/SARS COV-2   SARS-COV-2, PCR (IN-HOUSE)   DIFFERENTIAL MANUAL    Narrative:     Droplet, Contact, and Eye Protection   PERIPHERAL SMEAR REVIEW    Narrative:     Droplet, Contact, and Eye Protection   PLATELET ESTIMATE    Narrative:     Droplet, Contact, and Eye Protection   MORPHOLOGY    Narrative:     Droplet, Contact, and Eye Protection   ESTIMATED GFR    Narrative:     Droplet, Contact, and Eye Protection     All labs reviewed by me.    RADIOLOGY  DX-CHEST-PORTABLE (1 VIEW)   Final Result      1.  The cardiac silhouette is mildly enlarged.        The radiologist's interpretation of all radiological studies have been reviewed by me.    EKG Interpretation  Interpreted by me  Normal Sinus Rhythm. Normal Rate  Normal QTc  Normal QRS  Normal Axis  Non-specific ST change    COURSE & MEDICAL DECISION MAKING  Pertinent Labs & Imaging studies reviewed. (See chart for details)    4:32 PM - Patient seen and examined at bedside. Ordered CXR, COVID testing, CBC w/ diff, and EKG to evaluate her symptoms. The differential diagnoses include but are not limited to: CXR for infiltrates send-out COVID testing, and CBC to assess for neutropenia.     6:15 PM - Reviewed patient's labs which show a critically low WBC of 2.2. ANC calculated to be 165. Paged oncology. CMP and HCG qual ordered.      Medical Decision Making:   The patient's white count is low.  She has an absolute neutrophil count of 165.  I did contact Dr. Swenson of oncology.  He did review their records and she has been seen about a year and a half ago and was presumptively diagnosed with familial neutropenia.  Her x-ray shows no pneumonia.  She appears in well condition initially we are going to give her antibiotics and Neupogen.  Turns out she is 27 weeks pregnant I  did discuss that with Dr. Swenson once more and is been elected not to place her on that due to fetal risk and her very stable presentation.  I am given her Rocephin.  She is agreeable not to take Neupogen for the concern of the fetus.  She has no fever.  I am giving her the Rocephin and discharging her to follow-up with oncology with a phone call tomorrow.  She knows to return if she gives a fever above 100.8 for immediate reevaluation.    DISPOSITION:.     FINAL IMPRESSION  1. Upper respiratory tract infection, unspecified type    2. Neutropenia, unspecified type (HCC)          Macho FARIAS (Scribe), am scribing for, and in the presence of, Aba Gonsalez M.D..    Electronically signed by: Macho Rose (Scribe), 8/6/2020    Aba FARIAS M.D. personally performed the services described in this documentation, as scribed by Macho oRse in my presence, and it is both accurate and complete.    The note accurately reflects work and decisions made by me.  Aba Gonsalez M.D.  8/6/2020  7:32 PM

## 2020-08-07 ENCOUNTER — HOSPITAL ENCOUNTER (OUTPATIENT)
Facility: MEDICAL CENTER | Age: 30
End: 2020-08-07
Attending: NURSE PRACTITIONER
Payer: MEDICAID

## 2020-08-07 LAB
FORWARD REASON: SPWHY: NORMAL
FORWARDED TO LAB: SPWHR: NORMAL
SPECIMEN SENT: SPWT1: NORMAL

## 2020-08-07 NOTE — ED NOTES
COVID-19 Test Follow-Up  08/07/20    Patient is positive for COVID-19.    I have informed the patient of the positive result by telephone and that the Health Dept would be in contact soon. Instructed them to continue to quarantine and self-isolate according with the CDC guidance or as otherwise directed by the Health Dept.    Per the CDC (non-test-based strategy) should continue to quarantine until: At least 3 days (72 hours) have passed since recovery defined as resolution of fever without the use of fever-reducing medications and improvement in respiratory symptoms (e.g., cough, shortness of breath); and, At least 10 days have passed since symptoms first appeared.    They are advised to return to the ER for worsening symptoms including difficulty breathing, ongoing fever, weakness or chest pain.    Patient is pregnant per ED chart note, advised her to follow up with OBGYN provider.  Patient was concerned about getting her children tested. Advised patient to contact [facility] Scheduling department at [phone] to schedule testing for her children.     Anson Dubon, PharmD

## 2020-08-07 NOTE — ED NOTES
"Pt discharged Home, pt ambulatory to the [facility]. A/O x 4, ambulatory and with a steady gait. IV discontinued and gauze placed, pt in possession of belongings. Pt provided discharge education and information pertaining follow up appointments. Pt received copy of discharge instructions and verbalized understanding. Questions and concerns answered at bedside.        BP (!) 97/52   Pulse 84   Temp 36.8 °C (98.3 °F) (Temporal)   Resp 16   Ht 1.499 m (4' 11\")   Wt 60 kg (132 lb 4.4 oz)   SpO2 97%   BMI 26.72 kg/m²   "

## 2020-10-05 LAB — STREP GP B DNA PCR: NEGATIVE

## 2020-10-16 ENCOUNTER — HOSPITAL ENCOUNTER (OUTPATIENT)
Dept: OBGYN | Facility: MEDICAL CENTER | Age: 30
End: 2020-10-16
Attending: OBSTETRICS & GYNECOLOGY
Payer: MEDICAID

## 2020-10-16 LAB
COVID ORDER STATUS COVID19: NORMAL
SARS-COV-2 RNA RESP QL NAA+PROBE: NOTDETECTED
SPECIMEN SOURCE: NORMAL

## 2020-10-16 PROCEDURE — U0003 INFECTIOUS AGENT DETECTION BY NUCLEIC ACID (DNA OR RNA); SEVERE ACUTE RESPIRATORY SYNDROME CORONAVIRUS 2 (SARS-COV-2) (CORONAVIRUS DISEASE [COVID-19]), AMPLIFIED PROBE TECHNIQUE, MAKING USE OF HIGH THROUGHPUT TECHNOLOGIES AS DESCRIBED BY CMS-2020-01-R: HCPCS

## 2020-10-16 PROCEDURE — C9803 HOPD COVID-19 SPEC COLLECT: HCPCS | Performed by: OBSTETRICS & GYNECOLOGY

## 2020-10-16 NOTE — PROGRESS NOTES
Pt here for covid screen; test performed; pt tolerated well; given self isolating discharge instructions, verbalizes understanding; discharged to home.

## 2020-10-19 ENCOUNTER — HOSPITAL ENCOUNTER (INPATIENT)
Facility: MEDICAL CENTER | Age: 30
LOS: 2 days | End: 2020-10-21
Attending: OBSTETRICS & GYNECOLOGY | Admitting: OBSTETRICS & GYNECOLOGY
Payer: MEDICAID

## 2020-10-19 ENCOUNTER — ANESTHESIA (OUTPATIENT)
Dept: ANESTHESIOLOGY | Facility: MEDICAL CENTER | Age: 30
End: 2020-10-19
Payer: MEDICAID

## 2020-10-19 ENCOUNTER — ANESTHESIA EVENT (OUTPATIENT)
Dept: ANESTHESIOLOGY | Facility: MEDICAL CENTER | Age: 30
End: 2020-10-19
Payer: MEDICAID

## 2020-10-19 ENCOUNTER — APPOINTMENT (OUTPATIENT)
Dept: OBGYN | Facility: MEDICAL CENTER | Age: 30
End: 2020-10-19
Attending: OBSTETRICS & GYNECOLOGY
Payer: MEDICAID

## 2020-10-19 LAB
BASOPHILS # BLD AUTO: 1.7 % (ref 0–1.8)
BASOPHILS # BLD: 0.05 K/UL (ref 0–0.12)
EOSINOPHIL # BLD AUTO: 0.3 K/UL (ref 0–0.51)
EOSINOPHIL NFR BLD: 9.5 % (ref 0–6.9)
ERYTHROCYTE [DISTWIDTH] IN BLOOD BY AUTOMATED COUNT: 43.9 FL (ref 35.9–50)
HCT VFR BLD AUTO: 34.4 % (ref 37–47)
HGB BLD-MCNC: 10.6 G/DL (ref 12–16)
HOLDING TUBE BB 8507: NORMAL
LYMPHOCYTES # BLD AUTO: 1.82 K/UL (ref 1–4.8)
LYMPHOCYTES NFR BLD: 56.9 % (ref 22–41)
MANUAL DIFF BLD: NORMAL
MCH RBC QN AUTO: 24.8 PG (ref 27–33)
MCHC RBC AUTO-ENTMCNC: 30.8 G/DL (ref 33.6–35)
MCV RBC AUTO: 80.4 FL (ref 81.4–97.8)
MONOCYTES # BLD AUTO: 0.75 K/UL (ref 0–0.85)
MONOCYTES NFR BLD AUTO: 23.3 % (ref 0–13.4)
MORPHOLOGY BLD-IMP: NORMAL
NEUTROPHILS # BLD AUTO: 0.28 K/UL (ref 2–7.15)
NEUTROPHILS NFR BLD: 8.6 % (ref 44–72)
NRBC # BLD AUTO: 0 K/UL
NRBC BLD-RTO: 0 /100 WBC
PLATELET # BLD AUTO: 363 K/UL (ref 164–446)
PLATELET BLD QL SMEAR: NORMAL
PMV BLD AUTO: 9.5 FL (ref 9–12.9)
RBC # BLD AUTO: 4.28 M/UL (ref 4.2–5.4)
WBC # BLD AUTO: 3.2 K/UL (ref 4.8–10.8)

## 2020-10-19 PROCEDURE — 85027 COMPLETE CBC AUTOMATED: CPT

## 2020-10-19 PROCEDURE — 85007 BL SMEAR W/DIFF WBC COUNT: CPT

## 2020-10-19 PROCEDURE — 700105 HCHG RX REV CODE 258: Performed by: OBSTETRICS & GYNECOLOGY

## 2020-10-19 PROCEDURE — 36415 COLL VENOUS BLD VENIPUNCTURE: CPT

## 2020-10-19 PROCEDURE — A9270 NON-COVERED ITEM OR SERVICE: HCPCS | Performed by: OBSTETRICS & GYNECOLOGY

## 2020-10-19 PROCEDURE — 770002 HCHG ROOM/CARE - OB PRIVATE (112)

## 2020-10-19 PROCEDURE — 700101 HCHG RX REV CODE 250: Performed by: ANESTHESIOLOGY

## 2020-10-19 PROCEDURE — 700111 HCHG RX REV CODE 636 W/ 250 OVERRIDE (IP)

## 2020-10-19 PROCEDURE — 700102 HCHG RX REV CODE 250 W/ 637 OVERRIDE(OP): Performed by: OBSTETRICS & GYNECOLOGY

## 2020-10-19 PROCEDURE — 59200 INSERT CERVICAL DILATOR: CPT

## 2020-10-19 RX ORDER — SODIUM CHLORIDE, SODIUM LACTATE, POTASSIUM CHLORIDE, CALCIUM CHLORIDE 600; 310; 30; 20 MG/100ML; MG/100ML; MG/100ML; MG/100ML
INJECTION, SOLUTION INTRAVENOUS CONTINUOUS
Status: DISPENSED | OUTPATIENT
Start: 2020-10-19 | End: 2020-10-20

## 2020-10-19 RX ORDER — ROPIVACAINE HYDROCHLORIDE 2 MG/ML
INJECTION, SOLUTION EPIDURAL; INFILTRATION; PERINEURAL
Status: COMPLETED
Start: 2020-10-19 | End: 2020-10-19

## 2020-10-19 RX ORDER — DEXTROSE, SODIUM CHLORIDE, SODIUM LACTATE, POTASSIUM CHLORIDE, AND CALCIUM CHLORIDE 5; .6; .31; .03; .02 G/100ML; G/100ML; G/100ML; G/100ML; G/100ML
INJECTION, SOLUTION INTRAVENOUS CONTINUOUS
Status: DISCONTINUED | OUTPATIENT
Start: 2020-10-20 | End: 2020-10-20

## 2020-10-19 RX ORDER — ONDANSETRON 4 MG/1
4 TABLET, ORALLY DISINTEGRATING ORAL EVERY 6 HOURS PRN
Status: DISCONTINUED | OUTPATIENT
Start: 2020-10-19 | End: 2020-10-21 | Stop reason: HOSPADM

## 2020-10-19 RX ORDER — ONDANSETRON 2 MG/ML
4 INJECTION INTRAMUSCULAR; INTRAVENOUS EVERY 6 HOURS PRN
Status: DISCONTINUED | OUTPATIENT
Start: 2020-10-19 | End: 2020-10-21 | Stop reason: HOSPADM

## 2020-10-19 RX ORDER — URSODIOL 300 MG/1
300 CAPSULE ORAL EVERY 8 HOURS
COMMUNITY
End: 2022-02-11

## 2020-10-19 RX ORDER — MISOPROSTOL 200 UG/1
800 TABLET ORAL
Status: DISCONTINUED | OUTPATIENT
Start: 2020-10-19 | End: 2020-10-20 | Stop reason: HOSPADM

## 2020-10-19 RX ORDER — LIDOCAINE HYDROCHLORIDE AND EPINEPHRINE 15; 5 MG/ML; UG/ML
INJECTION, SOLUTION EPIDURAL
Status: COMPLETED | OUTPATIENT
Start: 2020-10-19 | End: 2020-10-19

## 2020-10-19 RX ADMIN — SODIUM CHLORIDE, POTASSIUM CHLORIDE, SODIUM LACTATE AND CALCIUM CHLORIDE: 600; 310; 30; 20 INJECTION, SOLUTION INTRAVENOUS at 20:21

## 2020-10-19 RX ADMIN — ROPIVACAINE HYDROCHLORIDE 200 MG: 2 INJECTION, SOLUTION EPIDURAL; INFILTRATION; PERINEURAL at 21:10

## 2020-10-19 RX ADMIN — ROPIVACAINE HYDROCHLORIDE 200 MG: 2 INJECTION, SOLUTION EPIDURAL; INFILTRATION at 21:10

## 2020-10-19 RX ADMIN — MISOPROSTOL 25 MCG: 100 TABLET ORAL at 18:30

## 2020-10-19 RX ADMIN — LIDOCAINE HYDROCHLORIDE,EPINEPHRINE BITARTRATE 3 ML: 15; .005 INJECTION, SOLUTION EPIDURAL; INFILTRATION; INTRACAUDAL; PERINEURAL at 21:10

## 2020-10-19 RX ADMIN — SODIUM CHLORIDE, POTASSIUM CHLORIDE, SODIUM LACTATE AND CALCIUM CHLORIDE: 600; 310; 30; 20 INJECTION, SOLUTION INTRAVENOUS at 21:17

## 2020-10-19 ASSESSMENT — PATIENT HEALTH QUESTIONNAIRE - PHQ9
SUM OF ALL RESPONSES TO PHQ9 QUESTIONS 1 AND 2: 0
SUM OF ALL RESPONSES TO PHQ9 QUESTIONS 1 AND 2: 0
2. FEELING DOWN, DEPRESSED, IRRITABLE, OR HOPELESS: NOT AT ALL
1. LITTLE INTEREST OR PLEASURE IN DOING THINGS: NOT AT ALL
1. LITTLE INTEREST OR PLEASURE IN DOING THINGS: NOT AT ALL
2. FEELING DOWN, DEPRESSED, IRRITABLE, OR HOPELESS: NOT AT ALL

## 2020-10-19 ASSESSMENT — LIFESTYLE VARIABLES
EVER_SMOKED: NEVER
ALCOHOL_USE: NO

## 2020-10-19 ASSESSMENT — FIBROSIS 4 INDEX: FIB4 SCORE: 0.32

## 2020-10-20 LAB
ACTION RH IMMUNE GLOB 8505RHG: NORMAL
ERYTHROCYTE [DISTWIDTH] IN BLOOD BY AUTOMATED COUNT: 44.2 FL (ref 35.9–50)
HCT VFR BLD AUTO: 31.6 % (ref 37–47)
HGB BLD-MCNC: 10 G/DL (ref 12–16)
IMMUNE ROSETTING TEST 8505FMH: NORMAL
MCH RBC QN AUTO: 25.3 PG (ref 27–33)
MCHC RBC AUTO-ENTMCNC: 31.6 G/DL (ref 33.6–35)
MCV RBC AUTO: 80 FL (ref 81.4–97.8)
NUMBER OF RH DOSES IND 8505RD: 1
PLATELET # BLD AUTO: 352 K/UL (ref 164–446)
PMV BLD AUTO: 9.9 FL (ref 9–12.9)
RBC # BLD AUTO: 3.95 M/UL (ref 4.2–5.4)
WBC # BLD AUTO: 2.7 K/UL (ref 4.8–10.8)

## 2020-10-20 PROCEDURE — 700111 HCHG RX REV CODE 636 W/ 250 OVERRIDE (IP): Performed by: OBSTETRICS & GYNECOLOGY

## 2020-10-20 PROCEDURE — A9270 NON-COVERED ITEM OR SERVICE: HCPCS | Performed by: OBSTETRICS & GYNECOLOGY

## 2020-10-20 PROCEDURE — 3E0P7VZ INTRODUCTION OF HORMONE INTO FEMALE REPRODUCTIVE, VIA NATURAL OR ARTIFICIAL OPENING: ICD-10-PCS | Performed by: OBSTETRICS & GYNECOLOGY

## 2020-10-20 PROCEDURE — 770002 HCHG ROOM/CARE - OB PRIVATE (112)

## 2020-10-20 PROCEDURE — 59409 OBSTETRICAL CARE: CPT

## 2020-10-20 PROCEDURE — 85027 COMPLETE CBC AUTOMATED: CPT

## 2020-10-20 PROCEDURE — 36415 COLL VENOUS BLD VENIPUNCTURE: CPT

## 2020-10-20 PROCEDURE — 85461 HEMOGLOBIN FETAL: CPT

## 2020-10-20 PROCEDURE — 700102 HCHG RX REV CODE 250 W/ 637 OVERRIDE(OP): Performed by: OBSTETRICS & GYNECOLOGY

## 2020-10-20 PROCEDURE — 303615 HCHG EPIDURAL/SPINAL ANESTHESIA FOR LABOR

## 2020-10-20 PROCEDURE — 302151 K-PAD 14X20: Performed by: OBSTETRICS & GYNECOLOGY

## 2020-10-20 PROCEDURE — 304965 HCHG RECOVERY SERVICES

## 2020-10-20 PROCEDURE — 0HQ9XZZ REPAIR PERINEUM SKIN, EXTERNAL APPROACH: ICD-10-PCS | Performed by: OBSTETRICS & GYNECOLOGY

## 2020-10-20 RX ORDER — ROPIVACAINE HYDROCHLORIDE 2 MG/ML
INJECTION, SOLUTION EPIDURAL; INFILTRATION; PERINEURAL CONTINUOUS
Status: DISCONTINUED | OUTPATIENT
Start: 2020-10-20 | End: 2020-10-20

## 2020-10-20 RX ORDER — HYDROCODONE BITARTRATE AND ACETAMINOPHEN 10; 325 MG/1; MG/1
1 TABLET ORAL EVERY 4 HOURS PRN
Status: DISCONTINUED | OUTPATIENT
Start: 2020-10-20 | End: 2020-10-21 | Stop reason: HOSPADM

## 2020-10-20 RX ORDER — MISOPROSTOL 200 UG/1
600 TABLET ORAL
Status: DISCONTINUED | OUTPATIENT
Start: 2020-10-20 | End: 2020-10-21 | Stop reason: HOSPADM

## 2020-10-20 RX ORDER — BISACODYL 10 MG
10 SUPPOSITORY, RECTAL RECTAL PRN
Status: DISCONTINUED | OUTPATIENT
Start: 2020-10-20 | End: 2020-10-21 | Stop reason: HOSPADM

## 2020-10-20 RX ORDER — SODIUM CHLORIDE, SODIUM LACTATE, POTASSIUM CHLORIDE, CALCIUM CHLORIDE 600; 310; 30; 20 MG/100ML; MG/100ML; MG/100ML; MG/100ML
INJECTION, SOLUTION INTRAVENOUS PRN
Status: DISCONTINUED | OUTPATIENT
Start: 2020-10-20 | End: 2020-10-21 | Stop reason: HOSPADM

## 2020-10-20 RX ORDER — HYDROCODONE BITARTRATE AND ACETAMINOPHEN 5; 325 MG/1; MG/1
1 TABLET ORAL EVERY 4 HOURS PRN
Status: DISCONTINUED | OUTPATIENT
Start: 2020-10-20 | End: 2020-10-21 | Stop reason: HOSPADM

## 2020-10-20 RX ORDER — METHYLERGONOVINE MALEATE 0.2 MG/ML
0.2 INJECTION INTRAVENOUS
Status: DISCONTINUED | OUTPATIENT
Start: 2020-10-20 | End: 2020-10-21 | Stop reason: HOSPADM

## 2020-10-20 RX ORDER — SODIUM CHLORIDE, SODIUM LACTATE, POTASSIUM CHLORIDE, AND CALCIUM CHLORIDE .6; .31; .03; .02 G/100ML; G/100ML; G/100ML; G/100ML
250 INJECTION, SOLUTION INTRAVENOUS PRN
Status: DISCONTINUED | OUTPATIENT
Start: 2020-10-20 | End: 2020-10-20 | Stop reason: HOSPADM

## 2020-10-20 RX ORDER — SODIUM CHLORIDE, SODIUM LACTATE, POTASSIUM CHLORIDE, AND CALCIUM CHLORIDE .6; .31; .03; .02 G/100ML; G/100ML; G/100ML; G/100ML
1000 INJECTION, SOLUTION INTRAVENOUS
Status: DISCONTINUED | OUTPATIENT
Start: 2020-10-20 | End: 2020-10-20 | Stop reason: HOSPADM

## 2020-10-20 RX ORDER — DOCUSATE SODIUM 100 MG/1
100 CAPSULE, LIQUID FILLED ORAL 2 TIMES DAILY PRN
Status: DISCONTINUED | OUTPATIENT
Start: 2020-10-20 | End: 2020-10-21 | Stop reason: HOSPADM

## 2020-10-20 RX ORDER — IBUPROFEN 600 MG/1
600 TABLET ORAL EVERY 6 HOURS PRN
Status: DISCONTINUED | OUTPATIENT
Start: 2020-10-20 | End: 2020-10-21 | Stop reason: HOSPADM

## 2020-10-20 RX ADMIN — OXYTOCIN 125 ML/HR: 10 INJECTION, SOLUTION INTRAMUSCULAR; INTRAVENOUS at 04:30

## 2020-10-20 RX ADMIN — OXYTOCIN 2000 ML/HR: 10 INJECTION, SOLUTION INTRAMUSCULAR; INTRAVENOUS at 03:31

## 2020-10-20 RX ADMIN — IBUPROFEN 600 MG: 600 TABLET, FILM COATED ORAL at 20:52

## 2020-10-20 RX ADMIN — IBUPROFEN 600 MG: 600 TABLET, FILM COATED ORAL at 14:41

## 2020-10-20 RX ADMIN — IBUPROFEN 600 MG: 600 TABLET, FILM COATED ORAL at 05:24

## 2020-10-20 ASSESSMENT — EDINBURGH POSTNATAL DEPRESSION SCALE (EPDS)
THINGS HAVE BEEN GETTING ON TOP OF ME: NO, MOST OF THE TIME I HAVE COPED QUITE WELL
I HAVE BEEN ANXIOUS OR WORRIED FOR NO GOOD REASON: HARDLY EVER
I HAVE BEEN SO UNHAPPY THAT I HAVE HAD DIFFICULTY SLEEPING: NOT AT ALL
THE THOUGHT OF HARMING MYSELF HAS OCCURRED TO ME: NEVER
I HAVE BEEN ABLE TO LAUGH AND SEE THE FUNNY SIDE OF THINGS: AS MUCH AS I ALWAYS COULD
I HAVE LOOKED FORWARD WITH ENJOYMENT TO THINGS: AS MUCH AS I EVER DID
I HAVE FELT SCARED OR PANICKY FOR NO GOOD REASON: NO, NOT MUCH
I HAVE BEEN SO UNHAPPY THAT I HAVE BEEN CRYING: NO, NEVER
I HAVE BLAMED MYSELF UNNECESSARILY WHEN THINGS WENT WRONG: YES, SOME OF THE TIME
I HAVE FELT SAD OR MISERABLE: NO, NOT AT ALL

## 2020-10-20 ASSESSMENT — PATIENT HEALTH QUESTIONNAIRE - PHQ9
SUM OF ALL RESPONSES TO PHQ9 QUESTIONS 1 AND 2: 0
2. FEELING DOWN, DEPRESSED, IRRITABLE, OR HOPELESS: NOT AT ALL
1. LITTLE INTEREST OR PLEASURE IN DOING THINGS: NOT AT ALL

## 2020-10-20 ASSESSMENT — PAIN DESCRIPTION - PAIN TYPE: TYPE: ACUTE PAIN

## 2020-10-20 ASSESSMENT — PAIN SCALES - GENERAL: PAIN_LEVEL: 2

## 2020-10-20 NOTE — PROGRESS NOTES
2000 Report received from ADELFO Amaral RN. Assumed care of pt.     2008 Pt reports LOF and intensifying contractions. SVE 3/60/-3. No LOF noted on exam. Bulging bag felt.     2103 Dr. Palma at bedside for epidural placement.     2230 Report given to ADELFO Amaral RN.

## 2020-10-20 NOTE — PROGRESS NOTES
0630- Pt up to bathroom. Able to void. Very unstable on feet. Took Rn and FOB to ambulate. Was able to void. [place] care complete. Pad and brief applied. Pt back to bed. Infant wrapped under warmer with no heat until PP room available.

## 2020-10-20 NOTE — CARE PLAN
Problem: Knowledge Deficit  Goal: Knowledge of disease process/condition, treatment plan, diagnostic tests, and medications will improve  Outcome: PROGRESSING AS EXPECTED  Note: Prt and fob are up to date ion the POC. They will ask questions as needed and the RN will notify them of any changes.     Problem: Pain Management  Goal: Pain level will decrease to patient's comfort goal  Outcome: PROGRESSING AS EXPECTED  Flowsheets (Taken 10/20/2020 5826)  Pain Rating Scale (NPRS): 4  Note: Pt knows to ask for pain management intervention

## 2020-10-20 NOTE — PROGRESS NOTES
0700. Report from Ronit RICO RN. POC discussed and resumed. Pt given heat packs for her back. Pt has no needs at this time.

## 2020-10-20 NOTE — L&D DELIVERY NOTE
DATE OF SERVICE:  10/20/2020    Patient is a 29-year-old female  5, para 2-0-2-2 at 38 weeks who was   sent in for induction of labor secondary to cholestasis of pregnancy.  Patient   also has polyhydramnios with this pregnancy.  The patient had an abnormal   quad screen initially, but was followed up with [facility] testing and all   chromosome testing and genetic testing was noted to be within normal limits.    The patient, however, did develop cholestasis and polyhydramnios during this   pregnancy as well.  The patient upon arrival was noted to be 2 cm thick, -2   station, posterior and soft.  The patient was given Cytotec x1 for cervical   ripening and the patient went into active labor without any need for any other   agents.  The patient was then given an epidural for pain management, had   spontaneous rupture of membranes with copious amounts of clear amniotic fluid,   and went to complete dilation.  She delivered a viable male infant, Apgars of   8 and 9, over first-degree perineal laceration.  There was no nuchal cord   encountered.  Bulb suction was performed on the perineum.  Remainder infant   delivered without complications.  Placenta delivered spontaneously.    Three-vessel cord noted to be intact.  EBL is 150 mL.  Anesthesia again is   epidural.  The first-degree perineal laceration was reapproximated with 3-0   Vicryl.  Sex of the infant is male.  Weight of the infant is 6 pounds 4.5   ounces, Apgars are 8 and 9.  Currently, mother and infant are both doing well.       ____________________________________     MD CARL Levine / ADRIAN    DD:  10/20/2020 04:18:17  DT:  10/20/2020 07:26:26    D#:  [id number]  Job#:  [id number]

## 2020-10-20 NOTE — PROGRESS NOTES
Patient admitted to the floor. VSS. Bands and cuddles checked. Bleeding light and fundus firm. Will monitor.

## 2020-10-20 NOTE — LACTATION NOTE
"This note was copied from a baby's chart.  Baby 38 weeks, , MOB Hx IOL for Cholestasis, Polyhydramnios, breasts asymetrical. MOB chooses to breast & bottle feed this baby, but mostly breast. Mother reports she was not successful at breastfeeding previous two babies, c/o low milk supply. She reports she tried for 1-3 weeks then formula fed. Hand expression demo provided, able to express small drop from right breast. Encouraged mother to hand express & spoon feed back 4-5 times during day until milk supply comes in. Baby's diaper changed attempted to latch baby to right breast, no latch - baby too sleepy, see assessment. Teaching on \"supply & demand\", the more stimulation to breasts the more colostrum/BM. Encouraged mother to keep baby skin to skin to promote more breastfeeds, which will stimulate breasts.    Teaching on hunger cues, breastfeeding on cue, breastfeed a minimum 8x/24 hours, importance of skin to skin & cluster feeding.    Injoy breastfeeding card downloaded.    Breastfeeding plan:  Breastfeed on cue a minimum 8x/24 hours or by 4 hours from last feed. Hand express & spoon feed back 4-5 times during day in addition to breastfeeding until milk supply comes in.   "

## 2020-10-20 NOTE — ANESTHESIA PREPROCEDURE EVALUATION
Relevant Problems   No relevant active problems       Physical Exam    Airway   Mallampati: II  TM distance: >3 FB       Cardiovascular - normal exam  Rhythm: regular  Rate: normal     Dental - normal exam           Pulmonary    Abdominal - normal exam     Neurological - normal exam                 Anesthesia Plan    ASA 2       Plan - epidural   Neuraxial block will be labor analgesia      Plan Factors:   Patient was not previously instructed to abstain from smoking on day of procedure.  Patient did not smoke on day of procedure.                Informed Consent:    Anesthetic plan and risks discussed with patient.    Use of blood products discussed with: patient whom consented to blood products.

## 2020-10-20 NOTE — ANESTHESIA POSTPROCEDURE EVALUATION
Patient: Vanessa Ballesteros    Procedure Summary     Date: 10/19/20 Room / Location:     Anesthesia Start: 2109 Anesthesia Stop: 10/20/20 0328    Procedure: Labor Epidural Diagnosis:     Scheduled Providers:  Responsible Provider: Thuan Palma M.D.    Anesthesia Type: epidural ASA Status: 2          Final Anesthesia Type: epidural  Last vitals  BP   Blood Pressure: 134/79    Temp   36.4 °C (97.6 °F)    Pulse   Pulse: 69   Resp   18    SpO2   99 %      Anesthesia Post Evaluation    Patient location during evaluation: PACU  Patient participation: complete - patient participated  Level of consciousness: awake  Pain score: 2    Airway patency: patent  Anesthetic complications: no  Cardiovascular status: adequate  Respiratory status: acceptable  Hydration status: acceptable    PONV: none           Nurse Pain Score: 0 (NPRS)

## 2020-10-20 NOTE — H&P
History and Physical      Vanessa Ballesteros is a 29 y.o. female  at 38w0d who presents for IOL for cholestasis of pregnancy    Subjective:   negative  For CTXS.   negative Feels pain   negative for LOF  negative for vaginal bleeding.   positive for fetal movement    ROS: A comprehensive review of systems was negative.    Past Medical History:   Diagnosis Date   • Ileitis, terminal (HCC)    • Microcytic anemia 2016     Past Surgical History:   Procedure Laterality Date   • MARCELINO BY LAPAROSCOPY  2016    Procedure: MARCELINO BY LAPAROSCOPY;  Surgeon: Cem Pedro M.D.;  Location: SURGERY [facility];  Service:    • OTHER ABDOMINAL SURGERY       Family History   Problem Relation Age of Onset   • Heart Attack Mother    • Stroke Mother    • Hypertension Maternal Grandmother    • Diabetes Maternal Grandfather    • Cancer Paternal Grandmother         lung cancer      OB History    Para Term  AB Living   5 2     2 2   SAB TAB Ectopic Molar Multiple Live Births   2         2      # Outcome Date GA Lbr Mehul/2nd Weight Sex Delivery Anes PTL Lv   5 Current            4 SAB 2012 8w0d          3 SAB            2 Para            1 Para              Social History     Socioeconomic History   • Marital status: Single     Spouse name: Not on file   • Number of children: Not on file   • Years of education: Not on file   • Highest education level: Not on file   Occupational History   • Not on file   Social Needs   • Financial resource strain: Not on file   • Food insecurity     Worry: Not on file     Inability: Not on file   • Transportation needs     Medical: Not on file     Non-medical: Not on file   Tobacco Use   • Smoking status: Former Smoker     Packs/day: 0.25     Years: 2.00     Pack years: 0.50     Types: Cigarettes   • Smokeless tobacco: Never Used   • Tobacco comment: quit    Substance and Sexual Activity   • Alcohol use: No   • Drug use: No   • Sexual activity: Yes     Partners:  "Male     Comment: none    Lifestyle   • Physical activity     Days per week: Not on file     Minutes per session: Not on file   • Stress: Not on file   Relationships   • Social connections     Talks on phone: Not on file     Gets together: Not on file     Attends Druze service: Not on file     Active member of club or organization: Not on file     Attends meetings of clubs or organizations: Not on file     Relationship status: Not on file   • Intimate partner violence     Fear of current or ex partner: Not on file     Emotionally abused: Not on file     Physically abused: Not on file     Forced sexual activity: Not on file   Other Topics Concern   • Not on file   Social History Narrative   • Not on file     Allergies: Patient has no known allergies.    Prenatal care with James starting at 11 weeks with following problems:  Cholestasis and polyhydramnios  Objective:      BP (!) 92/60   Pulse 66   Temp 36.4 °C (97.6 °F) (Temporal)   Resp 18   Ht 1.499 m (4' 11\")   Wt 62.6 kg (138 lb)   SpO2 99%     General:   no acute distress, alert, cooperative   Skin:   normal   HEENT:  PERRLA, EOMI, Sclera clear, anicteric and Neck supple with midline trachea   Lungs:   CTA bilateral   Heart:   S1, S2 normal, no murmur, click, rub or gallop, regular rate and rhythm, regular rate and rhythm, brisk carotid upstroke without bruits, peripheral pulses very brisk, chest is clear without rales or wheezing, no pedal edema, no JVD, no hepatosplenomegaly   Abdomen:   gravid, NT   EFW:  6 lbs   Pelvis:  adequate with gynecoid pelvis   FHT:  reactive NST   Uterine Size: S=D   Presentations: Cephalic   Cervix:     Dilation: 2cm    Effacement: Long    Station:  -2    Consistency: Soft    Position: Posterior     Lab Review  Lab:   Covid negative     Assessment:   Vanessa Ballesteros at 38w0d  Labor status: IOL  Obstetrical history significant for cholestasis and polyhydramnios  Plan:   Admit to L&D  GBS negative  Cytotec with pitocin if " needed  Epidural for pain

## 2020-10-20 NOTE — PROGRESS NOTES
Discussed with patient about what her plans are with formula and breastfeeding.  Patient did not breastfeed her last two infants for long, about a month, she stated that her breast milk really did not come in and her infants were not gaining any weight.   Patient had already given the infant formula 2 times.  One time down in labor and deliver and one time after she got infant from the nursery.  Educated her on supply and demand.  Educated her to placed infant skin to skin and offer breast whenever infant is hungry, if she wants to breastfeed.

## 2020-10-20 NOTE — ANESTHESIA TIME REPORT
Anesthesia Start and Stop Event Times     Date Time Event    10/19/2020 2109 Anesthesia Start     2118 Ready for Procedure    10/20/2020 0328 Anesthesia Stop        Responsible Staff  10/19/20 to 10/20/20    Name Role Begin End    Thuan Palma M.D. Anesth 2109 0328        Preop Diagnosis (Free Text):  Pre-op Diagnosis             Preop Diagnosis (Codes):    Post op Diagnosis  Pain during labor      Premium Reason  A. 3PM - 7AM    Comments:

## 2020-10-20 NOTE — ANESTHESIA PROCEDURE NOTES
Epidural Block    Date/Time: 10/19/2020 9:10 PM  Performed by: Thuan Palma M.D.  Authorized by: Thuan Palma M.D.     Start Time:  10/19/2020 9:10 PM  End Time:  10/19/2020 9:15 PM  Reason for Block: labor analgesia    patient identified, IV checked, site marked, risks and benefits discussed, surgical consent, monitors and equipment checked, pre-op evaluation, timeout performed and at patient's request    Patient Position:  Sitting  Prep: Betadine    Monitoring:  Blood pressure and continuous pulse oximetry  Approach:  Midline  Location:  L3-L4  Injection Technique:  ZULEMA air  Strength:  1%  Dose:  3ml  Needle Type:  Tuohy  Needle Gauge:  17 G  Needle Length:  3.5 in  Loss of resistance::  6  Catheter at Skin Depth:  13  Test Dose Result:  Negative

## 2020-10-20 NOTE — PROGRESS NOTES
Late entry due to patient care.   1900 Received beside report from [facility] RN; assuming care of patient; all questions answered; patient resting comfortably in bed, denies needs at this time; vital signs in stable condition; patient educated regarding call light system; call light and patient belongings in reach; patient encouraged to call RN for any needs, wants, desires or concerns. Whiteboard updated. Educated to alert RN when desiring epidural.   2000 [facility] assuming are of patient. Patient eventually desires epidural, but ok at this time.   2230 Resuming patient care, report received from [facility].   2240 Cisneros catheter placed; patient tolerated well. Patient resting comfortably in bed, still feeling one sided contractions on her left, bolus button pressed by patient, RN will come back to reassess, patient denies needs at this time, call light and patient belongings in reach, encouraged patient to call RN for any needs, wants, desires or concerns.   2317 Notified Dr. James regarding patient status, sve, fhr and contraction pattern, clarified with MD regarding medication patient takes for cholestasias, she is ok to not take it here. Orders received to start pitocin 2by2mU if patient contractions space out, RN to notify MD when patient is 8cm.   0150 Patient desires to change positions, as RN turning patient, noting bed feels wet, patient denies feeling warm and wet, patient grossly SROM clear fluid, SVE, complete linen changed.   0246 SVE, 9cm. Dr. James notified patient is 9cm. MD in route to hospital.   0320 Called Dr. James for MD vaibhav in parking lot.   0325 Dr. James at bedside, set up for delivery.  0328 Delivery of viable male at 38 weeks + 0 days with copious amount of clear fluid. Dr. James delivered, infant to mother chest dried and stimulated with warm towel. Infant in stable condition, apgars 8/9 placed to mother's chest skin to skin. Delivery bands placed on infant's ankle and wrist by transition RN,  band placed on patient's wrist, and second adult band placed on significant other , Leonel's wrist.   0332 Placenta delivered, spontaneously and intact. Repair in progress. Epidural off.   0340 Count correct. Fundus firm.    0630 Postpartum charge called inquiring if RN can move patient upstairs and give report to a dayshift RN. Charge sad they would get back to RN. Patient states she she feels so much better after she got up to void.   0700 bedside report given to Isatu RN, assumed care, POC discussed, all questions answered; vital signs stable.

## 2020-10-20 NOTE — PROGRESS NOTES
1800- Assumed care of pt. US and toco on. VS/S.  29 year old, , GIBRAN 20 at 37.6 wks, arrived to L&D for IOL- polyhydramnios and cholestasis.  Pt denies UC's, VB, or LOF. Pt reports +FM.  SVE cl/thick/high, posterior, soft, (outer os 2-3), JOSHUA Goel RN. Dr. James updated on pt's status, orders received for admit. Cytotec to be used for IOL. Dr. James to be called for additional orders.  Dr. James approves of pt having SL and eating until active labor.  Pt requesting epidural placement as labor progresses.  Pt is Covid negative (10/16).  - Cytotec placed, per Dr. James.  Admit complete.  - Report given to ADELFO Amaral RN.

## 2020-10-21 VITALS
WEIGHT: 138 LBS | DIASTOLIC BLOOD PRESSURE: 65 MMHG | HEIGHT: 59 IN | SYSTOLIC BLOOD PRESSURE: 100 MMHG | TEMPERATURE: 98.1 F | OXYGEN SATURATION: 98 % | BODY MASS INDEX: 27.82 KG/M2 | HEART RATE: 79 BPM | RESPIRATION RATE: 19 BRPM

## 2020-10-21 PROCEDURE — A9270 NON-COVERED ITEM OR SERVICE: HCPCS | Performed by: OBSTETRICS & GYNECOLOGY

## 2020-10-21 PROCEDURE — 700102 HCHG RX REV CODE 250 W/ 637 OVERRIDE(OP): Performed by: OBSTETRICS & GYNECOLOGY

## 2020-10-21 RX ADMIN — IBUPROFEN 600 MG: 600 TABLET, FILM COATED ORAL at 03:17

## 2020-10-21 RX ADMIN — IBUPROFEN 600 MG: 600 TABLET, FILM COATED ORAL at 10:37

## 2020-10-21 ASSESSMENT — PAIN DESCRIPTION - PAIN TYPE: TYPE: ACUTE PAIN

## 2020-10-21 NOTE — PROGRESS NOTES
"Called to patient room due to report of patient fall by FOB. RN observed patient getting up from the floor in the bathroom. Patient states she was getting up from using the toilet and \"slipped and fell on the water coming over the lip of the shower.\" RN observed water covering the bathroom floor in front of the toilet. Patient states she fell on her buttocks and feels like her perineal stitches opened. RN assisted patient to bed and assessed vagina and perineum which was found to be intact with no visual bleeding or tear. MIDAS placed and floor RN Billie notified. Patient walked with stable gait into shower. RN placed dry towels outside the shower to soak up water on the floor.   "

## 2020-10-21 NOTE — DISCHARGE SUMMARY
Discharge Summary:      Vanessa Ballesteros    Admit Date:   10/19/2020  Discharge Date:  10/21/2020     Admitting diagnosis:  Pregnancy  Indication for care in labor or delivery  Discharge Diagnosis: Status post vaginal, spontaneous.  Pregnancy Complications: polyhydramnios, cholestasis  Tubal Ligation:  no        History:  Past Medical History:   Diagnosis Date   • Ileitis, terminal (HCC)    • Microcytic anemia 2016     OB History    Para Term  AB Living   5 3 1   2 3   SAB TAB Ectopic Molar Multiple Live Births   2       0 3      # Outcome Date GA Lbr Mehul/2nd Weight Sex Delivery Anes PTL Lv   5 Term 10/20/20 38w0d / 00:33 2.85 kg (6 lb 4.5 oz) M Vag-Spont EPI N JEFERSON   4 SAB 2012 8w0d          3 SAB            2 Para            1 Para                 Patient has no known allergies.  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 , now para 3, was admitted with the above mentioned diagnosis, underwent Induction of Labor, vaginal, spontaneous. Patient postpartum course was unremarkable, with progressive advancement in diet , ambulation and toleration of oral analgesia. Patient without complaints today and desires discharge.      Vitals:    10/20/20 1850 10/20/20 2233 10/21/20 0234 10/21/20 0523   BP: 124/71 110/70 106/78 (!) 99/62   Pulse:  82 66 83   Resp:  19 18 16   Temp:  36.7 °C (98 °F) 36.8 °C (98.2 °F) 36.9 °C (98.4 °F)   TempSrc:  Temporal Temporal Temporal   SpO2:  96% 97% 96%   Weight:       Height:           Current Facility-Administered Medications   Medication Dose   • oxytocin  (PITOCIN) infusion (for postpartum)   mL/hr   • ibuprofen (MOTRIN) tablet 600 mg  600 mg   • HYDROcodone-acetaminophen (NORCO) 5-325 MG per tablet 1 Tab  1 Tab   • HYDROcodone/acetaminophen (NORCO)  MG per tablet 1 Tab  1 Tab   • LR infusion     • tetanus-dipth-acell pertussis (Tdap) inj 0.5 mL  0.5 mL   • measles, mumps and rubella vaccine (MMR) injection 0.5 mL  0.5 mL   • PRN oxytocin (PITOCIN) (20 Units/1000 mL) PRN for excessive uterine bleeding - See Admin Instr  125-999 mL/hr   • miSOPROStol (CYTOTEC) tablet 600 mcg  600 mcg   • methylergonovine (METHERGINE) injection 0.2 mg  0.2 mg   • docusate sodium (COLACE) capsule 100 mg  100 mg   • bisacodyl (DULCOLAX) suppository 10 mg  10 mg   • magnesium hydroxide (MILK OF MAGNESIA) suspension 30 mL  30 mL   • ondansetron (ZOFRAN ODT) dispertab 4 mg  4 mg    Or   • ondansetron (ZOFRAN) syringe/vial injection 4 mg  4 mg   • miSOPROStol (CYTOTEC) tablet 25 mcg  25 mcg       Exam:  Breast Exam: negative  Abdomen: Abdomen soft, non-tender. BS normal. No masses,  No organomegaly  Fundus Non Tender: yes  Incision: none  Perineum: perineum intact  Extremity: extremities, peripheral pulses and reflexes normal     Labs:  Recent Labs     10/19/20  1755 10/20/20  1330   WBC 3.2* 2.7*   RBC 4.28 3.95*   HEMOGLOBIN 10.6* 10.0*   HEMATOCRIT 34.4* 31.6*   MCV 80.4* 80.0*   MCH 24.8* 25.3*   MCHC 30.8* 31.6*   RDW 43.9 44.2   PLATELETCT 363 352   MPV 9.5 9.9        Activity:   Discharge to home  Pelvic Rest x 6 weeks    Assessment:  normal postpartum course  Discharge Assessment: No heavy bleeding or foul vaginal discharge      Follow up:6 weeks with James    Discharge Meds:   No current outpatient medications on file.       Faye James M.D.

## 2020-10-21 NOTE — PROGRESS NOTES
Discharge education reviewed with pt. Follow up instructions given. Pt verbalized understanding.

## 2020-10-21 NOTE — CARE PLAN
Problem: Communication  Goal: The ability to communicate needs accurately and effectively will improve  Outcome: MET     Problem: Safety  Goal: Will remain free from injury  Outcome: MET  Goal: Will remain free from falls  Outcome: MET     Problem: Infection  Goal: Will remain free from infection  Outcome: MET     Problem: Venous Thromboembolism (VTW)/Deep Vein Thrombosis (DVT) Prevention:  Goal: Patient will participate in Venous Thrombosis (VTE)/Deep Vein Thrombosis (DVT)Prevention Measures  Outcome: MET     Problem: Bowel/Gastric:  Goal: Normal bowel function is maintained or improved  Outcome: MET  Goal: Will not experience complications related to bowel motility  Outcome: MET     Problem: Knowledge Deficit  Goal: Knowledge of disease process/condition, treatment plan, diagnostic tests, and medications will improve  Outcome: MET  Goal: Knowledge of the prescribed therapeutic regimen will improve  Outcome: MET     Problem: Discharge Barriers/Planning  Goal: Patient's continuum of care needs will be met  Outcome: MET     Problem: Pain Management  Goal: Pain level will decrease to patient's comfort goal  Outcome: MET     Problem: Fluid Volume:  Goal: Will maintain balanced intake and output  Outcome: MET     Problem: Psychosocial Needs:  Goal: Level of anxiety will decrease  Outcome: MET     Problem: Altered physiologic condition related to immediate post-delivery state and potential for bleeding/hemorrhage  Goal: Patient physiologically stable as evidenced by normal lochia, palpable uterine involution and vital signs within normal limits  Outcome: MET     Problem: Potential for postpartum infection related to presence of episiotomy/vaginal tear and/or uterine contamination  Goal: Patient will be absent from signs and symptoms of infection  Outcome: MET     Problem: Alteration in comfort related to episiotomy, vaginal repair and/or after birth pains  Goal: Patient is able to ambulate, care for self and  infant  Outcome: MET  Goal: Patient verbalizes acceptable pain level  Outcome: MET     Problem: Potential knowledge deficit related to lack of understanding of self and  care  Goal: Patient will verbalize understanding of self and infant care  Outcome: MET  Goal: Patient will demonstrate ability to care for self and infant  Outcome: MET     Problem: Potential anxiety related to difficulty adapting to parental role  Goal: Patient will verbalize and demonstrate effective bonding and parenting behavior  Outcome: MET

## 2020-10-21 NOTE — CARE PLAN
Problem: Altered physiologic condition related to immediate post-delivery state and potential for bleeding/hemorrhage  Goal: Patient physiologically stable as evidenced by normal lochia, palpable uterine involution and vital signs within normal limits  Outcome: PROGRESSING AS EXPECTED     Problem: Alteration in comfort related to episiotomy, vaginal repair and/or after birth pains  Goal: Patient is able to ambulate, care for self and infant  Outcome: PROGRESSING AS EXPECTED     Problem: Potential anxiety related to difficulty adapting to parental role  Goal: Patient will verbalize and demonstrate effective bonding and parenting behavior  Outcome: PROGRESSING AS EXPECTED

## 2020-10-21 NOTE — PROGRESS NOTES
"Patient very tearful and anxious, requesting to leave the hospital stating \"I'm so tired of being here, I need to go home.\" Patient was able to be consoled, agreed to take an ibuprofen for discomfort and rest until the baby needs to eat again.     Fall safety reviewed, patient verbalizes understanding.    0300 Patient's mood is much improved, no longer tearful or impulsive. Making needs known appropriately.  "

## 2020-10-21 NOTE — DISCHARGE INSTRUCTIONS
"POSTPARTUM DISCHARGE INSTRUCTIONS FOR MOM    YOB: 1990   Age: 29 y.o.               Admit Date: 10/19/2020     Discharge Date: 10/21/2020  Attending Doctor:  Faye James M.D.                  Allergies:  Patient has no known allergies.    Be sure to schedule a follow-up appointment with your primary care doctor or any specialists as instructed.     REASONS TO CALL YOUR OBSTETRICIAN:  1.   Persistent fever or shaking chills (Temperature higher than 100.4)  2.   Heavy bleeding (soaking more than 1 pad per hour); Passing clots  3.   Foul odor from vagina  4.   Mastitis (Breast infection; breast pain, chills, fever, redness)  5.   Urinary pain, burning or frequency  6.   Episiotomy infection  7.   Severe depression longer than 24 hours    HAND WASHING  · Prior to handling the baby.  · Before breastfeeding or bottle feeding baby.  · After using the bathroom or changing the baby's diaper.     VAGINAL CARE  · Nothing inside vagina for 6 weeks: no sexual intercourse, tampons or douching.  · Bleeding may continue for 2-4 weeks.  Amount may vary.    · Call your physician for heavy bleeding which means soaking more than 1 pad per hour    BIRTH CONTROL  · It is possible to become pregnant at any time after delivery and while breastfeeding.  · Plan to discuss a method of birth control with your physician at your follow up visit. visit.    DIET AND ELIMINATION  · Eating more fiber (bran cereal, fruits, and vegetables) and drinking plenty of fluids will help to avoid constipation.  · Urinary frequency after childbirth is normal.    POSTPARTUM BLUES  During the first few days after birth, you may experience a sense of the \"blues\" which may include impatience, irritability or even crying.  These feeling come and go quickly.  However, as many as 1 in 10 women experience emotional symptoms known as postpartum depression.    Postpartum depression:  May start as early as the second or third day after delivery or take several " "weeks or months to develop.  Symptoms of \"blues\" are present, but are more intense:  Crying spells; loss of appetite; feelings of hopelessness or loss of control; fear of touching the baby; over concern or no concern at all about the baby; little or no concern about your own appearance/caring for yourself; and/or inability to sleep or excessive sleeping.  Contact your physician if you are experiencing any of these symptoms.    Crisis Hotline:  · [place] Crisis Hotline:  7-733-EBTVYPB  Or 1-861.730.7102  · [place] Crisis Hotline:  1-583.104.6331  Or 268-453-9242    PREVENTING SHAKEN BABY:  If you are angry or stressed, PUT THE BABY IN THE CRIB, step away, take some deep breaths, and wait until you are calm to care for the baby.  DO NOT SHAKE THE BABY.  You are not alone, call a supporter for help.    · Crisis Call Center 24/7 crisis line 611-424-1510 or 1-491.968.6447  · You can also text them, text \"ANSWER\" to 511421    QUIT SMOKING/TOBACCO USE:  I understand the use of any tobacco products increases my chance of suffering from future heart disease and could cause other illnesses which may shorten my life. Quitting the use of tobacco products is the single most important thing I can do to improve my health. For further information on smoking / tobacco cessation call a Toll Free Quit Line at 1-847.496.1097 (*National [employer]) or 1-850.114.9105 (American Lung Association) or you can access the web based program at www.lungusa.org.    · [place] Tobacco Users Help Line:  (198) 392-5418       Toll Free: 1-463.289.3917  · Quit Tobacco Program [facility] (281)222-8772    DEPRESSION / SUICIDE RISK:  As you are discharged from this [facility], it is important to learn how to keep safe from harming yourself.    Recognize the warning signs:  · Abrupt changes in personality, positive or negative- including increase in energy   · Giving away possessions  · Change in eating patterns- " significant weight changes-  positive or negative  · Change in sleeping patterns- unable to sleep or sleeping all the time   · Unwillingness or inability to communicate  · Depression  · Unusual sadness, discouragement and loneliness  · Talk of wanting to die  · Neglect of personal appearance   · Rebelliousness- reckless behavior  · Withdrawal from people/activities they love  · Confusion- inability to concentrate     If you or a loved one observes any of these behaviors or has concerns about self-harm, here's what you can do:  · Talk about it- your feelings and reasons for harming yourself  · Remove any means that you might use to hurt yourself (examples: pills, rope, extension cords, firearm)  · Get professional help from the community (Mental Health, Substance Abuse, psychological counseling)  · Do not be alone:Call your Safe Contact- someone whom you trust who will be there for you.  · Call your local CRISIS HOTLINE 286-0457 or 488-534-8489  · Call your local Children's Mobile Crisis Response Team [place] (915) 478-3543 or www[employer]  · Call the toll free National Suicide Prevention Hotlines   · National Suicide Prevention Lifeline 940-164-SDPQ (9900)  · National Hope Line Network 800-SUICIDE (327-2416)    DISCHARGE SURVEY:  Thank you for choosing [facility].  We hope we provided you with very good care.  You may be receiving a survey in the mail.  Please fill it out.  Your opinion is valuable to us.    ADDITIONAL EDUCATIONAL MATERIALS GIVEN TO PATIENT:        My signature on this form indicates that:  1.  I have reviewed and understand the above information  2.  My questions regarding this information have been answered to my satisfaction.  3.  I have formulated a plan with my discharge nurse to obtain my prescribed medication for home.

## 2020-10-21 NOTE — PROGRESS NOTES
Called Dr. James and notified her about the patient falling.  Notified her that patient fell on her bottom, but does not have a marcie.  She might bruise later.  Notified her about vitals being done at 1800 and after the fall. Dr. James does not want to have patient taking anymore showers. Will monitor.

## 2021-10-23 NOTE — PROGRESS NOTES
MS:   -ST   (R) PVC.16/.08/.34     29 yo M with asthma exacerbation  -basic labs, rvp/covid, CXR, IV, hydration, albuterol, prednisone  -f/u results, reeval

## 2022-02-10 ENCOUNTER — HOSPITAL ENCOUNTER (INPATIENT)
Facility: MEDICAL CENTER | Age: 32
LOS: 1 days | DRG: 871 | End: 2022-02-12
Attending: EMERGENCY MEDICINE | Admitting: INTERNAL MEDICINE
Payer: MEDICAID

## 2022-02-10 ENCOUNTER — APPOINTMENT (OUTPATIENT)
Dept: RADIOLOGY | Facility: MEDICAL CENTER | Age: 32
DRG: 871 | End: 2022-02-10
Attending: EMERGENCY MEDICINE
Payer: MEDICAID

## 2022-02-10 DIAGNOSIS — I95.89 OTHER SPECIFIED HYPOTENSION: ICD-10-CM

## 2022-02-10 DIAGNOSIS — J18.9 PNEUMONIA OF RIGHT UPPER LOBE DUE TO INFECTIOUS ORGANISM: ICD-10-CM

## 2022-02-10 DIAGNOSIS — J05.10 EPIGLOTTITIS: ICD-10-CM

## 2022-02-10 DIAGNOSIS — J02.9 PHARYNGITIS, UNSPECIFIED ETIOLOGY: ICD-10-CM

## 2022-02-10 DIAGNOSIS — R79.89 ELEVATED LFTS: ICD-10-CM

## 2022-02-10 LAB
ALBUMIN SERPL BCP-MCNC: 4.6 G/DL (ref 3.2–4.9)
ALBUMIN/GLOB SERPL: 1.1 G/DL
ALP SERPL-CCNC: 166 U/L (ref 30–99)
ALT SERPL-CCNC: 231 U/L (ref 2–50)
ANION GAP SERPL CALC-SCNC: 13 MMOL/L (ref 7–16)
APAP SERPL-MCNC: <5 UG/ML (ref 10–30)
AST SERPL-CCNC: 125 U/L (ref 12–45)
BASOPHILS # BLD AUTO: 0.9 % (ref 0–1.8)
BASOPHILS # BLD: 0.04 K/UL (ref 0–0.12)
BILIRUB SERPL-MCNC: 0.4 MG/DL (ref 0.1–1.5)
BUN SERPL-MCNC: 9 MG/DL (ref 8–22)
BURR CELLS BLD QL SMEAR: NORMAL
CALCIUM SERPL-MCNC: 9.4 MG/DL (ref 8.5–10.5)
CHLORIDE SERPL-SCNC: 103 MMOL/L (ref 96–112)
CO2 SERPL-SCNC: 22 MMOL/L (ref 20–33)
CREAT SERPL-MCNC: 0.46 MG/DL (ref 0.5–1.4)
EKG IMPRESSION: NORMAL
EOSINOPHIL # BLD AUTO: 0.29 K/UL (ref 0–0.51)
EOSINOPHIL NFR BLD: 6.1 % (ref 0–6.9)
ERYTHROCYTE [DISTWIDTH] IN BLOOD BY AUTOMATED COUNT: 41.1 FL (ref 35.9–50)
FLUAV RNA SPEC QL NAA+PROBE: NEGATIVE
FLUBV RNA SPEC QL NAA+PROBE: NEGATIVE
GLOBULIN SER CALC-MCNC: 4.2 G/DL (ref 1.9–3.5)
GLUCOSE SERPL-MCNC: 97 MG/DL (ref 65–99)
HAV IGM SERPL QL IA: NORMAL
HBV CORE IGM SER QL: NORMAL
HBV SURFACE AG SER QL: NORMAL
HCG SERPL QL: NEGATIVE
HCT VFR BLD AUTO: 32.6 % (ref 37–47)
HCV AB SER QL: NORMAL
HETEROPH AB SER QL: NEGATIVE
HGB BLD-MCNC: 10.1 G/DL (ref 12–16)
LACTATE BLD-SCNC: 0.9 MMOL/L (ref 0.5–2)
LG PLATELETS BLD QL SMEAR: NORMAL
LYMPHOCYTES # BLD AUTO: 1.14 K/UL (ref 1–4.8)
LYMPHOCYTES NFR BLD: 23.7 % (ref 22–41)
MANUAL DIFF BLD: NORMAL
MCH RBC QN AUTO: 25.4 PG (ref 27–33)
MCHC RBC AUTO-ENTMCNC: 31 G/DL (ref 33.6–35)
MCV RBC AUTO: 82.1 FL (ref 81.4–97.8)
MONOCYTES # BLD AUTO: 0.8 K/UL (ref 0–0.85)
MONOCYTES NFR BLD AUTO: 16.7 % (ref 0–13.4)
MORPHOLOGY BLD-IMP: NORMAL
NEUTROPHILS # BLD AUTO: 2.52 K/UL (ref 2–7.15)
NEUTROPHILS NFR BLD: 52.6 % (ref 44–72)
NRBC # BLD AUTO: 0 K/UL
NRBC BLD-RTO: 0 /100 WBC
PLATELET # BLD AUTO: 502 K/UL (ref 164–446)
PLATELET BLD QL SMEAR: NORMAL
PMV BLD AUTO: 8.8 FL (ref 9–12.9)
POIKILOCYTOSIS BLD QL SMEAR: NORMAL
POTASSIUM SERPL-SCNC: 3.5 MMOL/L (ref 3.6–5.5)
PROCALCITONIN SERPL-MCNC: <0.05 NG/ML
PROT SERPL-MCNC: 8.8 G/DL (ref 6–8.2)
RBC # BLD AUTO: 3.97 M/UL (ref 4.2–5.4)
RBC BLD AUTO: PRESENT
RSV RNA SPEC QL NAA+PROBE: NEGATIVE
S PYO DNA SPEC NAA+PROBE: NOT DETECTED
SARS-COV-2 RNA RESP QL NAA+PROBE: NOTDETECTED
SODIUM SERPL-SCNC: 138 MMOL/L (ref 135–145)
SPECIMEN SOURCE: NORMAL
WBC # BLD AUTO: 4.8 K/UL (ref 4.8–10.8)

## 2022-02-10 PROCEDURE — 83605 ASSAY OF LACTIC ACID: CPT

## 2022-02-10 PROCEDURE — 93005 ELECTROCARDIOGRAM TRACING: CPT | Performed by: EMERGENCY MEDICINE

## 2022-02-10 PROCEDURE — 76705 ECHO EXAM OF ABDOMEN: CPT

## 2022-02-10 PROCEDURE — 80074 ACUTE HEPATITIS PANEL: CPT

## 2022-02-10 PROCEDURE — 94760 N-INVAS EAR/PLS OXIMETRY 1: CPT

## 2022-02-10 PROCEDURE — C9803 HOPD COVID-19 SPEC COLLECT: HCPCS

## 2022-02-10 PROCEDURE — 96365 THER/PROPH/DIAG IV INF INIT: CPT

## 2022-02-10 PROCEDURE — 700111 HCHG RX REV CODE 636 W/ 250 OVERRIDE (IP): Performed by: EMERGENCY MEDICINE

## 2022-02-10 PROCEDURE — 700105 HCHG RX REV CODE 258: Performed by: EMERGENCY MEDICINE

## 2022-02-10 PROCEDURE — 80053 COMPREHEN METABOLIC PANEL: CPT

## 2022-02-10 PROCEDURE — 85007 BL SMEAR W/DIFF WBC COUNT: CPT

## 2022-02-10 PROCEDURE — 96368 THER/DIAG CONCURRENT INF: CPT

## 2022-02-10 PROCEDURE — 84145 PROCALCITONIN (PCT): CPT

## 2022-02-10 PROCEDURE — 86308 HETEROPHILE ANTIBODY SCREEN: CPT

## 2022-02-10 PROCEDURE — 96376 TX/PRO/DX INJ SAME DRUG ADON: CPT

## 2022-02-10 PROCEDURE — 0241U HCHG SARS-COV-2 COVID-19 NFCT DS RESP RNA 4 TRGT MIC: CPT

## 2022-02-10 PROCEDURE — 84703 CHORIONIC GONADOTROPIN ASSAY: CPT

## 2022-02-10 PROCEDURE — 85027 COMPLETE CBC AUTOMATED: CPT

## 2022-02-10 PROCEDURE — C9803 HOPD COVID-19 SPEC COLLECT: HCPCS | Performed by: EMERGENCY MEDICINE

## 2022-02-10 PROCEDURE — 71275 CT ANGIOGRAPHY CHEST: CPT

## 2022-02-10 PROCEDURE — 87651 STREP A DNA AMP PROBE: CPT

## 2022-02-10 PROCEDURE — 96375 TX/PRO/DX INJ NEW DRUG ADDON: CPT

## 2022-02-10 PROCEDURE — 87040 BLOOD CULTURE FOR BACTERIA: CPT

## 2022-02-10 PROCEDURE — 700117 HCHG RX CONTRAST REV CODE 255: Performed by: EMERGENCY MEDICINE

## 2022-02-10 PROCEDURE — 80143 DRUG ASSAY ACETAMINOPHEN: CPT

## 2022-02-10 PROCEDURE — 99285 EMERGENCY DEPT VISIT HI MDM: CPT

## 2022-02-10 RX ORDER — MORPHINE SULFATE 4 MG/ML
4 INJECTION INTRAVENOUS ONCE
Status: COMPLETED | OUTPATIENT
Start: 2022-02-10 | End: 2022-02-10

## 2022-02-10 RX ORDER — AMOXICILLIN AND CLAVULANATE POTASSIUM 875; 125 MG/1; MG/1
1 TABLET, FILM COATED ORAL ONCE
Status: DISCONTINUED | OUTPATIENT
Start: 2022-02-10 | End: 2022-02-10

## 2022-02-10 RX ORDER — ONDANSETRON 2 MG/ML
4 INJECTION INTRAMUSCULAR; INTRAVENOUS ONCE
Status: COMPLETED | OUTPATIENT
Start: 2022-02-10 | End: 2022-02-10

## 2022-02-10 RX ORDER — SODIUM CHLORIDE, SODIUM LACTATE, POTASSIUM CHLORIDE, CALCIUM CHLORIDE 600; 310; 30; 20 MG/100ML; MG/100ML; MG/100ML; MG/100ML
1000 INJECTION, SOLUTION INTRAVENOUS ONCE
Status: COMPLETED | OUTPATIENT
Start: 2022-02-10 | End: 2022-02-10

## 2022-02-10 RX ORDER — NAPROXEN 500 MG/1
500 TABLET ORAL 2 TIMES DAILY WITH MEALS
Qty: 60 TABLET | Refills: 0 | Status: SHIPPED | OUTPATIENT
Start: 2022-02-10 | End: 2023-02-20

## 2022-02-10 RX ORDER — KETOROLAC TROMETHAMINE 30 MG/ML
15 INJECTION, SOLUTION INTRAMUSCULAR; INTRAVENOUS ONCE
Status: COMPLETED | OUTPATIENT
Start: 2022-02-10 | End: 2022-02-10

## 2022-02-10 RX ORDER — AZITHROMYCIN 500 MG/1
500 INJECTION, POWDER, LYOPHILIZED, FOR SOLUTION INTRAVENOUS ONCE
Status: COMPLETED | OUTPATIENT
Start: 2022-02-10 | End: 2022-02-10

## 2022-02-10 RX ORDER — AMOXICILLIN AND CLAVULANATE POTASSIUM 875; 125 MG/1; MG/1
1 TABLET, FILM COATED ORAL 2 TIMES DAILY
Qty: 14 TABLET | Refills: 0 | Status: SHIPPED | OUTPATIENT
Start: 2022-02-10 | End: 2022-02-12

## 2022-02-10 RX ADMIN — MORPHINE SULFATE 4 MG: 4 INJECTION INTRAVENOUS at 16:00

## 2022-02-10 RX ADMIN — ONDANSETRON 4 MG: 2 INJECTION INTRAMUSCULAR; INTRAVENOUS at 16:00

## 2022-02-10 RX ADMIN — KETOROLAC TROMETHAMINE 15 MG: 30 INJECTION, SOLUTION INTRAMUSCULAR at 21:30

## 2022-02-10 RX ADMIN — SODIUM CHLORIDE 3 G: 900 INJECTION INTRAVENOUS at 19:14

## 2022-02-10 RX ADMIN — SODIUM CHLORIDE, POTASSIUM CHLORIDE, SODIUM LACTATE AND CALCIUM CHLORIDE 1000 ML: 600; 310; 30; 20 INJECTION, SOLUTION INTRAVENOUS at 22:15

## 2022-02-10 RX ADMIN — KETOROLAC TROMETHAMINE 15 MG: 30 INJECTION, SOLUTION INTRAMUSCULAR at 18:06

## 2022-02-10 RX ADMIN — MORPHINE SULFATE 4 MG: 4 INJECTION INTRAVENOUS at 22:47

## 2022-02-10 RX ADMIN — AZITHROMYCIN MONOHYDRATE 500 MG: 500 INJECTION, POWDER, LYOPHILIZED, FOR SOLUTION INTRAVENOUS at 18:30

## 2022-02-10 RX ADMIN — IOHEXOL 40 ML: 350 INJECTION, SOLUTION INTRAVENOUS at 17:29

## 2022-02-10 RX ADMIN — SODIUM CHLORIDE, POTASSIUM CHLORIDE, SODIUM LACTATE AND CALCIUM CHLORIDE 1000 ML: 600; 310; 30; 20 INJECTION, SOLUTION INTRAVENOUS at 18:06

## 2022-02-10 ASSESSMENT — FIBROSIS 4 INDEX: FIB4 SCORE: 0.28

## 2022-02-10 ASSESSMENT — PAIN DESCRIPTION - PAIN TYPE: TYPE: ACUTE PAIN

## 2022-02-10 NOTE — ED PROVIDER NOTES
ED Provider Note    Scribed for Mary Pickard M.D. by Josue Cortez-Reyes. 2/10/2022  2:56 PM    Primary care provider: KIARA Marques  Means of arrival: Walk-in  History obtained from: Patient  History limited by: None    CHIEF COMPLAINT  Chief Complaint   Patient presents with   • Sore Throat     pt was covid + 3 weeks ago   • Fever   • Headache   • Cough       HPI  Vanessa Ballesteros is a 31 y.o. female who presents to the Emergency Department for evaluation of a sore throat onset 3-4 days ago. She endorses an additional cough, headache, lower extremity swelling, and chest pain but denies any rhinorrhea. She notes that she tested positive for COVID 3 weeks ago but declares that her current symptoms are different. She denies tobacco or alcohol consumption.     REVIEW OF SYSTEMS  HEENT:  Positive for sore throat, no rhinorrhea   CARDIAC: Positive for chest pain  PULMONARY: Positive for cough  Musculoskeletal: Positive for lower extremity edema    See history of present illness. All other systems are negative. C.    PAST MEDICAL HISTORY   has a past medical history of Ileitis, terminal (HCC) and Microcytic anemia (2/14/2016).    SURGICAL HISTORY   has a past surgical history that includes jose a by laparoscopy (12/20/2016) and other abdominal surgery.    SOCIAL HISTORY  Social History     Tobacco Use   • Smoking status: Former Smoker     Packs/day: 0.25     Years: 2.00     Pack years: 0.50     Types: Cigarettes   • Smokeless tobacco: Never Used   • Tobacco comment: quit 12/12   Vaping Use   • Vaping Use: Never used   Substance Use Topics   • Alcohol use: No   • Drug use: No      Social History     Substance and Sexual Activity   Drug Use No       FAMILY HISTORY  Family History   Problem Relation Age of Onset   • Heart Attack Mother    • Stroke Mother    • Hypertension Maternal Grandmother    • Diabetes Maternal Grandfather    • Cancer Paternal Grandmother         lung cancer        CURRENT  "MEDICATIONS  Current Outpatient Medications   Medication Instructions   • Prenatal MV-Min-Fe Fum-FA-DHA (PRENATAL 1 PO) 1 Tablet, Oral, DAILY   • ursodiol (ACTIGALL) 300 mg, Oral, EVERY 8 HOURS       ALLERGIES  No Known Allergies    PHYSICAL EXAM  VITAL SIGNS: /67   Pulse (!) 113   Temp 37.1 °C (98.7 °F) (Temporal)   Resp 16   Ht 1.499 m (4' 11\")   Wt 51.9 kg (114 lb 6.7 oz)   SpO2 91%   BMI 23.11 kg/m²     Constitutional: Well developed, Well nourished, uncomfortable. Speaking full sentences  HEENT: Normocephalic, Atraumatic,  external ears normal, pharynx pink with some white patches on the back of her throat.,  Mucous  Membranes moist, No rhinorrhea or mucosal edema.  Eyes: PERRL, EOMI, Conjunctiva normal, No discharge.   Neck: Normal range of motion, No tenderness, Supple, No stridor.   Lymphatic: No lymphadenopathy    Cardiovascular: Tachycardic Rate and Rhythm, No murmurs,  rubs, or gallops.   Thorax & Lungs: Lungs clear to auscultation bilaterally, No respiratory distress, No wheezes, rhales or rhonchi, No chest wall tenderness.   Abdomen: Bowel sounds normal, Soft, non tender, non distended,  No pulsatile masses., no rebound guarding or peritoneal signs.   Skin: Warm, Dry, No erythema, No rash,   Back:  No CVA tenderness,  No spinal tenderness, bony crepitance, step offs, or instability.   Neurologic: Alert & oriented clear speech no focal deficits  Extremities: Equal, intact distal pulses, No cyanosis, clubbing 1+ bilateral lower extremity edema,  No tenderness.   Musculoskeletal: Good range of motion in all major joints. No tenderness to palpation or major deformities noted.       DIAGNOSTIC STUDIES / PROCEDURES    LABS  Results for orders placed or performed during the hospital encounter of 02/10/22   CoV-2, FLU A/B, and RSV by PCR (2-4 Hours [facility]Control) : Collect NP swab in VTM    Specimen: Respirate   Result Value Ref Range    Influenza virus A RNA Negative Negative    Influenza virus B, PCR " Negative Negative    RSV, PCR Negative Negative    SARS-CoV-2 by PCR NotDetected     SARS-CoV-2 Source NP Swab    CBC WITH DIFFERENTIAL   Result Value Ref Range    WBC 4.8 4.8 - 10.8 K/uL    RBC 3.97 (L) 4.20 - 5.40 M/uL    Hemoglobin 10.1 (L) 12.0 - 16.0 g/dL    Hematocrit 32.6 (L) 37.0 - 47.0 %    MCV 82.1 81.4 - 97.8 fL    MCH 25.4 (L) 27.0 - 33.0 pg    MCHC 31.0 (L) 33.6 - 35.0 g/dL    RDW 41.1 35.9 - 50.0 fL    Platelet Count 502 (H) 164 - 446 K/uL    MPV 8.8 (L) 9.0 - 12.9 fL    Neutrophils-Polys 52.60 44.00 - 72.00 %    Lymphocytes 23.70 22.00 - 41.00 %    Monocytes 16.70 (H) 0.00 - 13.40 %    Eosinophils 6.10 0.00 - 6.90 %    Basophils 0.90 0.00 - 1.80 %    Nucleated RBC 0.00 /100 WBC    Neutrophils (Absolute) 2.52 2.00 - 7.15 K/uL    Lymphs (Absolute) 1.14 1.00 - 4.80 K/uL    Monos (Absolute) 0.80 0.00 - 0.85 K/uL    Eos (Absolute) 0.29 0.00 - 0.51 K/uL    Baso (Absolute) 0.04 0.00 - 0.12 K/uL    NRBC (Absolute) 0.00 K/uL   COMP METABOLIC PANEL   Result Value Ref Range    Sodium 138 135 - 145 mmol/L    Potassium 3.5 (L) 3.6 - 5.5 mmol/L    Chloride 103 96 - 112 mmol/L    Co2 22 20 - 33 mmol/L    Anion Gap 13.0 7.0 - 16.0    Glucose 97 65 - 99 mg/dL    Bun 9 8 - 22 mg/dL    Creatinine 0.46 (L) 0.50 - 1.40 mg/dL    Calcium 9.4 8.5 - 10.5 mg/dL    AST(SGOT) 125 (H) 12 - 45 U/L    ALT(SGPT) 231 (H) 2 - 50 U/L    Alkaline Phosphatase 166 (H) 30 - 99 U/L    Total Bilirubin 0.4 0.1 - 1.5 mg/dL    Albumin 4.6 3.2 - 4.9 g/dL    Total Protein 8.8 (H) 6.0 - 8.2 g/dL    Globulin 4.2 (H) 1.9 - 3.5 g/dL    A-G Ratio 1.1 g/dL   Group A Strep by PCR    Specimen: Throat   Result Value Ref Range    Group A Strep by PCR Not Detected Not Detected   BETA-HCG QUALITATIVE SERUM   Result Value Ref Range    Beta-Hcg Qualitative Serum Negative Negative   MONONUCLEOSIS TEST QUAL   Result Value Ref Range    Heterophile Screen Negative Negative   ESTIMATED GFR   Result Value Ref Range    GFR If African American >60 >60 mL/min/1.73 m 2     GFR If Non African American >60 >60 mL/min/1.73 m 2   MORPHOLOGY   Result Value Ref Range    RBC Morphology Present     Large Platelets 1+     Poikilocytosis 1+     Echinocytes 1+    PERIPHERAL SMEAR REVIEW   Result Value Ref Range    Peripheral Smear Review see below    DIFFERENTIAL MANUAL   Result Value Ref Range    Manual Diff Status PERFORMED    PLATELET ESTIMATE   Result Value Ref Range    Plt Estimation Increased    HEPATITIS PANEL ACUTE(4 COMPONENTS)   Result Value Ref Range    Hepatitis B Surface Antigen Non-Reactive Non-Reactive    Hepatitis B Cors Ab,IgM Non-Reactive Non-Reactive    Hepatitis A Virus Ab, IgM Non-Reactive Non-Reactive    Hepatitis C Antibody Non-Reactive Non-Reactive   ACETAMINOPHEN   Result Value Ref Range    Acetaminophen -Tylenol <5.0 (L) 10.0 - 30.0 ug/mL   EKG (NOW)   Result Value Ref Range    Report       [facility] Emergency Dept.    Test Date:  2022-02-10  Pt Name:    FRAN ELAM                 Department: ER  MRN:        [id number]                      Room:       ED [facility]  Gender:     Female                       Technician: [id number]  :        1990                   Requested By:JONATHAN PINK  Order #:    [id number]                    Reading MD: JONATHAN PINK MD    Measurements  Intervals                                Axis  Rate:       97                           P:          58  SC:         136                          QRS:        70  QRSD:       88                           T:          10  QT:         328  QTc:        417    Interpretive Statements  SINUS RHYTHM  CONSIDER LEFT ATRIAL ABNORMALITY  BORDERLINE T WAVE ABNORMALITIES  Compared to ECG 2020 15:36:15  T-wave abnormality now present  Electronically Signed On 2- 17:45:56 PST by JONATHAN PINK MD         All labs reviewed by me.    EKG  12 lead EKG; Interpreted by me as noted above    RADIOLOGY  CT-CTA CHEST PULMONARY ARTERY W/ RECONS   Final Result      1.  Negative for pulmonary  embolism      2.  Right upper lobe airspace process consistent with pneumonia            US-RUQ   Final Result      1.  Prior cholecystectomy      2.  No biliary dilation        The radiologist's interpretation of all radiological studies have been reviewed by me.    COURSE & MEDICAL DECISION MAKING  Nursing notes, VS, PMSFHx reviewed in chart.    2:56 PM Patient seen and examined at bedside. Patient will be treated with morphine and Zofran. Ordered CT-CTA Chest Pulmonary artery w/, Group A Strep by PCR, Beta-HCG Qualitative Serum, Mononucleosis test Qual, CBC with differential, CMP, CoV-2, FLU A/B, and RSV by PCR, and EKG to evaluate her symptoms. The differential diagnoses include but are not limited to: PE, Strep, Mono, and dehydration. I informed the patient of my plan to obtain the above     HYDRATION: Based on the patient's presentation of Tachycardia the patient was given IV fluids. IV Hydration was used because oral hydration was not adequate alone. Upon recheck following hydration, the patient was Improved.    5:59 PM on recheck the patient is still uncomfortable.  I ordered IV Toradol and a liter of lactated Ringer's.  Blood cultures have been drawn.  She ambulates with a good pulse ox at 94% on room air.  We will discharge the patient home with Augmentin, Magic mouthwash and Naprosyn.  Advised to drink plenty of fluids and follow-up with her primary care physician for recheck of her liver enzymes.  Her hepatitis panel is normal.  He is return for any worsening pain shortness of breath or worsening symptoms.    The patient will return for new or worsening symptoms and is stable at the time of discharge.    The patient is referred to a primary physician for blood pressure management, diabetic screening, and for all other preventative health concerns.      DISPOSITION:  Patient will be discharged home in stable condition.    FOLLOW UP:  SHAHRZAD MarquesRMercedezN.  [address]  [facility] [address]  [facility]  [id number] [phone]    Call in 2 days  for recheck    [facility], Emergency Dept  [address] [facility] [address] [phone]    As needed, If symptoms worsen      OUTPATIENT MEDICATIONS:  New Prescriptions    AMOXICILLIN-CLAVULANATE (AUGMENTIN) 875-125 MG TAB    Take 1 Tablet by mouth 2 times a day for 7 days.    MAG HYDROX-AL HYDROX-SIMETH-DIPHENHYDRAMINE-LIDOCAINE    Take 30 mL by mouth every 6 hours as needed.    NAPROXEN (NAPROSYN) 500 MG TAB    Take 1 Tablet by mouth 2 times a day with meals.         FINAL IMPRESSION  1. Pneumonia of right upper lobe due to infectious organism    2. Pharyngitis, unspecified etiology    3. Elevated LFTs          I, Josue Cortez-Reyes (Scribe), am scribing for, and in the presence of, Mary Pickard M.D..    Electronically signed by: Josue Cortez-Reyes (Danielibrylee), 2/10/2022    Mary FARIAS M.D. personally performed the services described in this documentation, as scribed by Josue Cortez-Reyes in my presence, and it is both accurate and complete. C.    The note accurately reflects work and decisions made by me.  Mary Pickard M.D.  2/10/2022  6:00 PM

## 2022-02-10 NOTE — ED NOTES
COVID swab sent to lab.    Initiated Date/Time 9/15/2020 6:40 pm   Message Sent Date/Time 9/15/2020 6:43 pm   Source [facility] Contact Center   Department ACC NURSE   Method Secure Text   Contacted Carlos Alberto Carvajal MD   Details Patient   Message   [phone] PATIENT NUMBER -------------------------------- ACC NURSE LINE (IF QUESTIONS ONLY - [phone]) FROM: [facility] CARVAJAL ED DISPOSITION PER TRIAGE FOR COPD EXACERBATION AND \"TIGHTNESS TO LUNGS\" AFTER USING INHALERS AND NEBULIZERS PRESCRIBED. PATIENT REFUSING TO GO TO ED. PLEASE CALL PATIENT TO ADVISE (MAFOACCRN)

## 2022-02-11 ENCOUNTER — APPOINTMENT (OUTPATIENT)
Dept: RADIOLOGY | Facility: MEDICAL CENTER | Age: 32
DRG: 871 | End: 2022-02-11
Attending: FAMILY MEDICINE
Payer: MEDICAID

## 2022-02-11 PROBLEM — A41.9 SEPSIS (HCC): Status: ACTIVE | Noted: 2022-02-11

## 2022-02-11 PROBLEM — D72.819 LEUKOPENIA: Status: ACTIVE | Noted: 2022-02-11

## 2022-02-11 PROBLEM — J02.0 PHARYNGITIS DUE TO STREPTOCOCCUS SPECIES: Status: ACTIVE | Noted: 2022-02-11

## 2022-02-11 PROBLEM — J02.9 PHARYNGITIS: Status: ACTIVE | Noted: 2022-02-11

## 2022-02-11 PROBLEM — E83.42 HYPOMAGNESEMIA: Status: ACTIVE | Noted: 2022-02-11

## 2022-02-11 PROBLEM — J18.9 PNEUMONIA DUE TO INFECTIOUS ORGANISM: Status: ACTIVE | Noted: 2022-02-11

## 2022-02-11 LAB
ALBUMIN SERPL BCP-MCNC: 3.4 G/DL (ref 3.2–4.9)
ALBUMIN/GLOB SERPL: 1.1 G/DL
ALP SERPL-CCNC: 114 U/L (ref 30–99)
ALT SERPL-CCNC: 122 U/L (ref 2–50)
ANION GAP SERPL CALC-SCNC: 9 MMOL/L (ref 7–16)
ANISOCYTOSIS BLD QL SMEAR: ABNORMAL
APPEARANCE UR: CLEAR
AST SERPL-CCNC: 40 U/L (ref 12–45)
BACTERIA #/AREA URNS HPF: NEGATIVE /HPF
BASOPHILS # BLD AUTO: 0.9 % (ref 0–1.8)
BASOPHILS # BLD: 0.04 K/UL (ref 0–0.12)
BILIRUB SERPL-MCNC: 0.2 MG/DL (ref 0.1–1.5)
BILIRUB UR QL STRIP.AUTO: NEGATIVE
BUN SERPL-MCNC: 6 MG/DL (ref 8–22)
CALCIUM SERPL-MCNC: 8.4 MG/DL (ref 8.5–10.5)
CHLORIDE SERPL-SCNC: 102 MMOL/L (ref 96–112)
CO2 SERPL-SCNC: 24 MMOL/L (ref 20–33)
COLOR UR: YELLOW
CORTIS SERPL-MCNC: 111 UG/DL (ref 0–23)
CREAT SERPL-MCNC: 0.36 MG/DL (ref 0.5–1.4)
EOSINOPHIL # BLD AUTO: 0 K/UL (ref 0–0.51)
EOSINOPHIL NFR BLD: 0 % (ref 0–6.9)
EPI CELLS #/AREA URNS HPF: NEGATIVE /HPF
ERYTHROCYTE [DISTWIDTH] IN BLOOD BY AUTOMATED COUNT: 42 FL (ref 35.9–50)
GLOBULIN SER CALC-MCNC: 3.2 G/DL (ref 1.9–3.5)
GLUCOSE SERPL-MCNC: 134 MG/DL (ref 65–99)
GLUCOSE UR STRIP.AUTO-MCNC: NEGATIVE MG/DL
HCT VFR BLD AUTO: 26.7 % (ref 37–47)
HGB BLD-MCNC: 8.2 G/DL (ref 12–16)
HYALINE CASTS #/AREA URNS LPF: ABNORMAL /LPF
HYPOCHROMIA BLD QL SMEAR: ABNORMAL
INR PPP: 1.18 (ref 0.87–1.13)
KETONES UR STRIP.AUTO-MCNC: ABNORMAL MG/DL
LACTATE BLD-SCNC: 0.5 MMOL/L (ref 0.5–2)
LACTATE BLD-SCNC: 0.9 MMOL/L (ref 0.5–2)
LACTATE BLD-SCNC: 1 MMOL/L (ref 0.5–2)
LEUKOCYTE ESTERASE UR QL STRIP.AUTO: NEGATIVE
LYMPHOCYTES # BLD AUTO: 0.63 K/UL (ref 1–4.8)
LYMPHOCYTES NFR BLD: 14 % (ref 22–41)
MAGNESIUM SERPL-MCNC: 1.8 MG/DL (ref 1.5–2.5)
MAGNESIUM SERPL-MCNC: 2 MG/DL (ref 1.5–2.5)
MANUAL DIFF BLD: NORMAL
MCH RBC QN AUTO: 25.3 PG (ref 27–33)
MCHC RBC AUTO-ENTMCNC: 30.7 G/DL (ref 33.6–35)
MCV RBC AUTO: 82.4 FL (ref 81.4–97.8)
MICRO URNS: ABNORMAL
MICROCYTES BLD QL SMEAR: ABNORMAL
MONOCYTES # BLD AUTO: 0.32 K/UL (ref 0–0.85)
MONOCYTES NFR BLD AUTO: 7 % (ref 0–13.4)
MORPHOLOGY BLD-IMP: NORMAL
NEUTROPHILS # BLD AUTO: 3.51 K/UL (ref 2–7.15)
NEUTROPHILS NFR BLD: 73.7 % (ref 44–72)
NEUTS BAND NFR BLD MANUAL: 4.4 % (ref 0–10)
NITRITE UR QL STRIP.AUTO: NEGATIVE
NRBC # BLD AUTO: 0 K/UL
NRBC BLD-RTO: 0 /100 WBC
OVALOCYTES BLD QL SMEAR: NORMAL
PH UR STRIP.AUTO: 7 [PH] (ref 5–8)
PHOSPHATE SERPL-MCNC: 2.5 MG/DL (ref 2.5–4.5)
PLATELET # BLD AUTO: 380 K/UL (ref 164–446)
PLATELET BLD QL SMEAR: NORMAL
PMV BLD AUTO: 8.7 FL (ref 9–12.9)
POIKILOCYTOSIS BLD QL SMEAR: NORMAL
POTASSIUM SERPL-SCNC: 3.5 MMOL/L (ref 3.6–5.5)
PROCALCITONIN SERPL-MCNC: <0.05 NG/ML
PROT SERPL-MCNC: 6.6 G/DL (ref 6–8.2)
PROT UR QL STRIP: NEGATIVE MG/DL
PROTHROMBIN TIME: 14.7 SEC (ref 12–14.6)
RBC # BLD AUTO: 3.24 M/UL (ref 4.2–5.4)
RBC # URNS HPF: ABNORMAL /HPF
RBC BLD AUTO: PRESENT
RBC UR QL AUTO: ABNORMAL
SCCMEC + MECA PNL NOSE NAA+PROBE: NEGATIVE
SODIUM SERPL-SCNC: 135 MMOL/L (ref 135–145)
SP GR UR STRIP.AUTO: 1.01
UROBILINOGEN UR STRIP.AUTO-MCNC: 0.2 MG/DL
WBC # BLD AUTO: 4.5 K/UL (ref 4.8–10.8)
WBC #/AREA URNS HPF: ABNORMAL /HPF

## 2022-02-11 PROCEDURE — 87070 CULTURE OTHR SPECIMN AEROBIC: CPT

## 2022-02-11 PROCEDURE — 700102 HCHG RX REV CODE 250 W/ 637 OVERRIDE(OP): Performed by: INTERNAL MEDICINE

## 2022-02-11 PROCEDURE — 700105 HCHG RX REV CODE 258: Performed by: INTERNAL MEDICINE

## 2022-02-11 PROCEDURE — 85610 PROTHROMBIN TIME: CPT

## 2022-02-11 PROCEDURE — 700111 HCHG RX REV CODE 636 W/ 250 OVERRIDE (IP): Performed by: FAMILY MEDICINE

## 2022-02-11 PROCEDURE — 99223 1ST HOSP IP/OBS HIGH 75: CPT | Performed by: INTERNAL MEDICINE

## 2022-02-11 PROCEDURE — 700111 HCHG RX REV CODE 636 W/ 250 OVERRIDE (IP): Performed by: INTERNAL MEDICINE

## 2022-02-11 PROCEDURE — 87641 MR-STAPH DNA AMP PROBE: CPT

## 2022-02-11 PROCEDURE — 700111 HCHG RX REV CODE 636 W/ 250 OVERRIDE (IP): Performed by: EMERGENCY MEDICINE

## 2022-02-11 PROCEDURE — 700117 HCHG RX CONTRAST REV CODE 255: Performed by: FAMILY MEDICINE

## 2022-02-11 PROCEDURE — A9270 NON-COVERED ITEM OR SERVICE: HCPCS | Performed by: INTERNAL MEDICINE

## 2022-02-11 PROCEDURE — 85007 BL SMEAR W/DIFF WBC COUNT: CPT

## 2022-02-11 PROCEDURE — 83605 ASSAY OF LACTIC ACID: CPT | Mod: 91

## 2022-02-11 PROCEDURE — 81001 URINALYSIS AUTO W/SCOPE: CPT

## 2022-02-11 PROCEDURE — 700105 HCHG RX REV CODE 258: Performed by: FAMILY MEDICINE

## 2022-02-11 PROCEDURE — 80053 COMPREHEN METABOLIC PANEL: CPT

## 2022-02-11 PROCEDURE — 82533 TOTAL CORTISOL: CPT

## 2022-02-11 PROCEDURE — 84100 ASSAY OF PHOSPHORUS: CPT

## 2022-02-11 PROCEDURE — 96376 TX/PRO/DX INJ SAME DRUG ADON: CPT

## 2022-02-11 PROCEDURE — 96367 TX/PROPH/DG ADDL SEQ IV INF: CPT

## 2022-02-11 PROCEDURE — 96372 THER/PROPH/DIAG INJ SC/IM: CPT

## 2022-02-11 PROCEDURE — 83735 ASSAY OF MAGNESIUM: CPT | Mod: 91

## 2022-02-11 PROCEDURE — 96375 TX/PRO/DX INJ NEW DRUG ADDON: CPT

## 2022-02-11 PROCEDURE — 70491 CT SOFT TISSUE NECK W/DYE: CPT

## 2022-02-11 PROCEDURE — 700102 HCHG RX REV CODE 250 W/ 637 OVERRIDE(OP): Performed by: FAMILY MEDICINE

## 2022-02-11 PROCEDURE — 96366 THER/PROPH/DIAG IV INF ADDON: CPT

## 2022-02-11 PROCEDURE — 85027 COMPLETE CBC AUTOMATED: CPT

## 2022-02-11 PROCEDURE — 770004 HCHG ROOM/CARE - ONCOLOGY PRIVATE *

## 2022-02-11 PROCEDURE — 700101 HCHG RX REV CODE 250: Performed by: INTERNAL MEDICINE

## 2022-02-11 PROCEDURE — A9270 NON-COVERED ITEM OR SERVICE: HCPCS | Performed by: FAMILY MEDICINE

## 2022-02-11 PROCEDURE — 84145 PROCALCITONIN (PCT): CPT

## 2022-02-11 RX ORDER — ONDANSETRON 2 MG/ML
4 INJECTION INTRAMUSCULAR; INTRAVENOUS EVERY 4 HOURS PRN
Status: DISCONTINUED | OUTPATIENT
Start: 2022-02-11 | End: 2022-02-12 | Stop reason: HOSPADM

## 2022-02-11 RX ORDER — SODIUM CHLORIDE, SODIUM LACTATE, POTASSIUM CHLORIDE, AND CALCIUM CHLORIDE .6; .31; .03; .02 G/100ML; G/100ML; G/100ML; G/100ML
30 INJECTION, SOLUTION INTRAVENOUS
Status: COMPLETED | OUTPATIENT
Start: 2022-02-11 | End: 2022-02-11

## 2022-02-11 RX ORDER — OXYCODONE HYDROCHLORIDE 5 MG/1
5 TABLET ORAL
Status: DISCONTINUED | OUTPATIENT
Start: 2022-02-11 | End: 2022-02-12 | Stop reason: HOSPADM

## 2022-02-11 RX ORDER — SODIUM CHLORIDE 9 MG/ML
INJECTION, SOLUTION INTRAVENOUS CONTINUOUS
Status: DISCONTINUED | OUTPATIENT
Start: 2022-02-11 | End: 2022-02-12

## 2022-02-11 RX ORDER — PROMETHAZINE HYDROCHLORIDE 25 MG/1
12.5-25 TABLET ORAL EVERY 4 HOURS PRN
Status: DISCONTINUED | OUTPATIENT
Start: 2022-02-11 | End: 2022-02-12 | Stop reason: HOSPADM

## 2022-02-11 RX ORDER — POTASSIUM CHLORIDE 20 MEQ/1
20 TABLET, EXTENDED RELEASE ORAL DAILY
Status: DISCONTINUED | OUTPATIENT
Start: 2022-02-11 | End: 2022-02-12 | Stop reason: HOSPADM

## 2022-02-11 RX ORDER — PROMETHAZINE HYDROCHLORIDE 25 MG/1
12.5-25 SUPPOSITORY RECTAL EVERY 4 HOURS PRN
Status: DISCONTINUED | OUTPATIENT
Start: 2022-02-11 | End: 2022-02-12 | Stop reason: HOSPADM

## 2022-02-11 RX ORDER — AMOXICILLIN 250 MG
2 CAPSULE ORAL 2 TIMES DAILY
Status: DISCONTINUED | OUTPATIENT
Start: 2022-02-11 | End: 2022-02-12 | Stop reason: HOSPADM

## 2022-02-11 RX ORDER — MAGNESIUM SULFATE HEPTAHYDRATE 40 MG/ML
2 INJECTION, SOLUTION INTRAVENOUS ONCE
Status: COMPLETED | OUTPATIENT
Start: 2022-02-11 | End: 2022-02-11

## 2022-02-11 RX ORDER — SODIUM CHLORIDE 9 MG/ML
1000 INJECTION, SOLUTION INTRAVENOUS ONCE
Status: COMPLETED | OUTPATIENT
Start: 2022-02-11 | End: 2022-02-11

## 2022-02-11 RX ORDER — SODIUM CHLORIDE 9 MG/ML
INJECTION, SOLUTION INTRAVENOUS CONTINUOUS
Status: ACTIVE | OUTPATIENT
Start: 2022-02-11 | End: 2022-02-11

## 2022-02-11 RX ORDER — IBUPROFEN 400 MG/1
600 TABLET ORAL EVERY 6 HOURS PRN
Status: DISCONTINUED | OUTPATIENT
Start: 2022-02-11 | End: 2022-02-12 | Stop reason: HOSPADM

## 2022-02-11 RX ORDER — BISACODYL 10 MG
10 SUPPOSITORY, RECTAL RECTAL
Status: DISCONTINUED | OUTPATIENT
Start: 2022-02-11 | End: 2022-02-12 | Stop reason: HOSPADM

## 2022-02-11 RX ORDER — OXYCODONE HYDROCHLORIDE 5 MG/1
2.5 TABLET ORAL
Status: DISCONTINUED | OUTPATIENT
Start: 2022-02-11 | End: 2022-02-12 | Stop reason: HOSPADM

## 2022-02-11 RX ORDER — HYDROMORPHONE HYDROCHLORIDE 1 MG/ML
0.25 INJECTION, SOLUTION INTRAMUSCULAR; INTRAVENOUS; SUBCUTANEOUS
Status: DISCONTINUED | OUTPATIENT
Start: 2022-02-11 | End: 2022-02-12 | Stop reason: HOSPADM

## 2022-02-11 RX ORDER — POLYETHYLENE GLYCOL 3350 17 G/17G
1 POWDER, FOR SOLUTION ORAL
Status: DISCONTINUED | OUTPATIENT
Start: 2022-02-11 | End: 2022-02-12 | Stop reason: HOSPADM

## 2022-02-11 RX ORDER — ONDANSETRON 4 MG/1
4 TABLET, ORALLY DISINTEGRATING ORAL EVERY 4 HOURS PRN
Status: DISCONTINUED | OUTPATIENT
Start: 2022-02-11 | End: 2022-02-12 | Stop reason: HOSPADM

## 2022-02-11 RX ORDER — PROCHLORPERAZINE EDISYLATE 5 MG/ML
5-10 INJECTION INTRAMUSCULAR; INTRAVENOUS EVERY 4 HOURS PRN
Status: DISCONTINUED | OUTPATIENT
Start: 2022-02-11 | End: 2022-02-12 | Stop reason: HOSPADM

## 2022-02-11 RX ORDER — NOREPINEPHRINE BITARTRATE 0.03 MG/ML
.5-3 INJECTION, SOLUTION INTRAVENOUS CONTINUOUS
Status: DISCONTINUED | OUTPATIENT
Start: 2022-02-11 | End: 2022-02-11

## 2022-02-11 RX ORDER — ACETAMINOPHEN 325 MG/1
650 TABLET ORAL EVERY 6 HOURS PRN
Status: DISCONTINUED | OUTPATIENT
Start: 2022-02-11 | End: 2022-02-12 | Stop reason: HOSPADM

## 2022-02-11 RX ORDER — DOXYCYCLINE 100 MG/1
100 TABLET ORAL EVERY 12 HOURS
Status: DISCONTINUED | OUTPATIENT
Start: 2022-02-11 | End: 2022-02-11

## 2022-02-11 RX ADMIN — OXYCODONE 5 MG: 5 TABLET ORAL at 01:19

## 2022-02-11 RX ADMIN — ENOXAPARIN SODIUM 40 MG: 40 INJECTION SUBCUTANEOUS at 05:36

## 2022-02-11 RX ADMIN — POTASSIUM CHLORIDE 20 MEQ: 1500 TABLET, EXTENDED RELEASE ORAL at 10:06

## 2022-02-11 RX ADMIN — IOHEXOL 75 ML: 350 INJECTION, SOLUTION INTRAVENOUS at 09:45

## 2022-02-11 RX ADMIN — HYDROCORTISONE SODIUM SUCCINATE 50 MG: 100 INJECTION, POWDER, FOR SOLUTION INTRAMUSCULAR; INTRAVENOUS at 00:49

## 2022-02-11 RX ADMIN — PROCHLORPERAZINE EDISYLATE 10 MG: 5 INJECTION INTRAMUSCULAR; INTRAVENOUS at 10:02

## 2022-02-11 RX ADMIN — OXYCODONE 5 MG: 5 TABLET ORAL at 10:03

## 2022-02-11 RX ADMIN — SODIUM CHLORIDE: 9 INJECTION, SOLUTION INTRAVENOUS at 08:52

## 2022-02-11 RX ADMIN — SODIUM CHLORIDE 1000 ML: 9 INJECTION, SOLUTION INTRAVENOUS at 07:59

## 2022-02-11 RX ADMIN — OXYCODONE 5 MG: 5 TABLET ORAL at 21:38

## 2022-02-11 RX ADMIN — Medication 1 SPRAY: at 02:29

## 2022-02-11 RX ADMIN — ONDANSETRON 4 MG: 2 INJECTION INTRAMUSCULAR; INTRAVENOUS at 02:29

## 2022-02-11 RX ADMIN — MAGNESIUM SULFATE HEPTAHYDRATE 2 G: 40 INJECTION, SOLUTION INTRAVENOUS at 10:06

## 2022-02-11 RX ADMIN — HYDROCORTISONE SODIUM SUCCINATE 100 MG: 100 INJECTION, POWDER, FOR SOLUTION INTRAMUSCULAR; INTRAVENOUS at 05:36

## 2022-02-11 RX ADMIN — IBUPROFEN 600 MG: 600 TABLET, FILM COATED ORAL at 16:11

## 2022-02-11 RX ADMIN — SODIUM CHLORIDE: 9 INJECTION, SOLUTION INTRAVENOUS at 00:59

## 2022-02-11 RX ADMIN — OXYCODONE 5 MG: 5 TABLET ORAL at 05:35

## 2022-02-11 RX ADMIN — SODIUM CHLORIDE: 9 INJECTION, SOLUTION INTRAVENOUS at 18:50

## 2022-02-11 RX ADMIN — VANCOMYCIN HYDROCHLORIDE 1250 MG: 500 INJECTION, POWDER, LYOPHILIZED, FOR SOLUTION INTRAVENOUS at 01:18

## 2022-02-11 RX ADMIN — Medication 1 SPRAY: at 13:32

## 2022-02-11 RX ADMIN — CEFTRIAXONE SODIUM 2 G: 10 INJECTION, POWDER, FOR SOLUTION INTRAVENOUS at 00:49

## 2022-02-11 RX ADMIN — SODIUM CHLORIDE, POTASSIUM CHLORIDE, SODIUM LACTATE AND CALCIUM CHLORIDE 1557 ML: 600; 310; 30; 20 INJECTION, SOLUTION INTRAVENOUS at 03:34

## 2022-02-11 ASSESSMENT — ENCOUNTER SYMPTOMS
SPUTUM PRODUCTION: 1
FEVER: 0
EYES NEGATIVE: 1
DIARRHEA: 0
SHORTNESS OF BREATH: 1
COUGH: 1
HEADACHES: 0
CHILLS: 1
CHILLS: 0
CONSTIPATION: 1
DIAPHORESIS: 0
SENSORY CHANGE: 0
MYALGIAS: 0
SHORTNESS OF BREATH: 0
BACK PAIN: 0
DIZZINESS: 0
WHEEZING: 0
WEAKNESS: 1
PSYCHIATRIC NEGATIVE: 1
SORE THROAT: 1
NAUSEA: 0
FLANK PAIN: 0
PALPITATIONS: 0
SPEECH CHANGE: 0
VOMITING: 0
NECK PAIN: 0
NERVOUS/ANXIOUS: 1
FEVER: 1
FOCAL WEAKNESS: 0
BLURRED VISION: 0
ABDOMINAL PAIN: 0
MYALGIAS: 1
NEUROLOGICAL NEGATIVE: 1
HEARTBURN: 0
CARDIOVASCULAR NEGATIVE: 1

## 2022-02-11 ASSESSMENT — PAIN DESCRIPTION - PAIN TYPE
TYPE: ACUTE PAIN

## 2022-02-11 ASSESSMENT — LIFESTYLE VARIABLES
EVER FELT BAD OR GUILTY ABOUT YOUR DRINKING: NO
ALCOHOL_USE: NO
EVER HAD A DRINK FIRST THING IN THE MORNING TO STEADY YOUR NERVES TO GET RID OF A HANGOVER: NO
HOW MANY TIMES IN THE PAST YEAR HAVE YOU HAD 5 OR MORE DRINKS IN A DAY: 0
CONSUMPTION TOTAL: NEGATIVE
DOES PATIENT WANT TO STOP DRINKING: NO
ON A TYPICAL DAY WHEN YOU DRINK ALCOHOL HOW MANY DRINKS DO YOU HAVE: 0
TOTAL SCORE: 0
TOTAL SCORE: 0
HAVE PEOPLE ANNOYED YOU BY CRITICIZING YOUR DRINKING: NO
HAVE YOU EVER FELT YOU SHOULD CUT DOWN ON YOUR DRINKING: NO
TOTAL SCORE: 0
AVERAGE NUMBER OF DAYS PER WEEK YOU HAVE A DRINK CONTAINING ALCOHOL: 0

## 2022-02-11 ASSESSMENT — COPD QUESTIONNAIRES
HAVE YOU SMOKED AT LEAST 100 CIGARETTES IN YOUR ENTIRE LIFE: NO/DON'T KNOW
COPD SCREENING SCORE: 0
DO YOU EVER COUGH UP ANY MUCUS OR PHLEGM?: NO/ONLY WITH OCCASIONAL COLDS OR INFECTIONS
DURING THE PAST 4 WEEKS HOW MUCH DID YOU FEEL SHORT OF BREATH: NONE/LITTLE OF THE TIME

## 2022-02-11 ASSESSMENT — COGNITIVE AND FUNCTIONAL STATUS - GENERAL
DAILY ACTIVITIY SCORE: 24
SUGGESTED CMS G CODE MODIFIER DAILY ACTIVITY: CH
MOBILITY SCORE: 24
SUGGESTED CMS G CODE MODIFIER MOBILITY: CH

## 2022-02-11 ASSESSMENT — FIBROSIS 4 INDEX: FIB4 SCORE: 0.29

## 2022-02-11 NOTE — ED NOTES
Patient lying on gurney resting with eyes closed. Even and unlabored respirations visualized. No needs or complaints at this time.

## 2022-02-11 NOTE — ED NOTES
Report from Kady RN, will assume care at this time. Patient is lying on gurney resting with eyes closed, responds to verbal stimuli, A&O x 4 moves all extremities equally.  at bedside. No needs at this time.

## 2022-02-11 NOTE — ED PROVIDER NOTES
ED Provider Note    Addendum 9:45 PM  Asked to see patient by RN staff who were concerned that the patient is hypotensive and tachycardic in the setting of recently diagnosed pneumonia.  Patient is not hypoxic.  She was scheduled to be discharged after infusion of Unasyn and azithromycin.  Given the abnormal vital signs I evaluated the patient at bedside.  She was noted to be tachycardic however it is also notable that the blood pressure cuff was being taken around her clothing.  Suggested to nursing staff that the blood pressure cuff be put on her arm underneath her clothing to get a more accurate reading.  Tachycardia is real however I feel this is expected given her diagnosis.  Patient seems uncomfortable however she is perfusing well and is not requiring supplemental oxygen.  Provided she is not actually hypotensive, I feel it is reasonable to tolerate a small amount of tachycardia given the lack of findings on CTA to suggest pulmonary embolism or any other findings to suggest sepsis.    10:30 PM  Patient persistently tachycardic even with a manual blood pressure cuff.  Discussed case with charge nurse.  Patient will be moved to read 12 for further evaluation.  Possible she will need central line placement and pressor use.     11:15 PM  Patient sitting up in bed, eating some crackers and drinking water.  States her symptoms have improved after receiving morphine.  Blood pressure now 107/54.  She is about 700 mL into her second bag of LR.  At this point I suspect that the patient is not actually septic and was more likely dehydrated.  Alternatively, now that she is in an ER gurney instead of a chair in the hallway, it may be that the blood pressure reading is more accurate.  Regardless, she is unlikely to need hospitalization at this point and certainly does not need a central line.  I will discuss options with the patient, and her mother who is on her way in, once the second bag is finished and we have a vital  sign trend in the right direction.  Her heart rate is around 105 now.    12:11 AM  Patient now becoming mildly hypotensive again however map is around 64.  Her heart rate is going up to around 115.  The second liter of fluids is in.  I discussed options with the patient and her mother and I am uncomfortable discharging her home as I am unclear on why her hemodynamics seem to be somewhat unstable.  It is possible this is related to early sepsis however is notable that her lactate is normal.  There is possible she has some form of adrenal insufficiency and I will likely give her stress dose steroids after talking with the hospitalist service.    1. Pneumonia of right upper lobe due to infectious organism    2. Pharyngitis, unspecified etiology    3. Elevated LFTs    4. Other specified hypotension

## 2022-02-11 NOTE — PROGRESS NOTES
[facility] Medicine Daily Progress Note    Date of Service  2/11/2022    Chief Complaint  Vanessa Ballesteros is a 31 y.o. female admitted 2/10/2022 with pneumonia.    Hospital Course  Admitted with pneumonia and pharyngitis.  Patient states she had recent COVID-19, this was noted be negative here.    Interval Problem Update  Pneumonia - feels a little better  Pharyngitis -with odynophagia, denies any dysphagia or airway difficulty  Low potassium and magnesium    Updates given plan of care discussed with patient's significant other was at bedside.    I have personally seen and examined the patient at bedside. I discussed the plan of care with patient, family and bedside RN.    Consultants/Specialty  None    Code Status  Full Code    Disposition  Patient is not medically cleared for discharge.   Anticipate discharge to to home with close outpatient follow-up.  I have placed the appropriate orders for post-discharge needs.    Review of Systems  Review of Systems   Constitutional: Positive for malaise/fatigue. Negative for chills, diaphoresis and fever.   HENT: Positive for sore throat. Negative for congestion and hearing loss.    Eyes: Negative for blurred vision.   Respiratory: Positive for cough. Negative for shortness of breath and wheezing.    Cardiovascular: Negative for chest pain, palpitations and leg swelling.   Gastrointestinal: Negative for abdominal pain, diarrhea, heartburn, nausea and vomiting.   Genitourinary: Negative for dysuria, flank pain and hematuria.   Musculoskeletal: Negative for back pain, joint pain, myalgias and neck pain.   Skin: Negative for rash.   Neurological: Positive for weakness. Negative for dizziness, sensory change, speech change, focal weakness and headaches.   Psychiatric/Behavioral: The patient is nervous/anxious.         Physical Exam  Temp:  [37.1 °C (98.7 °F)-39.4 °C (102.9 °F)] 37.3 °C (99.2 °F)  Pulse:  [] 92  Resp:  [14-27] 16  BP: ()/(38-69) 93/60  SpO2:  [90  %-95 %] 92 %    Physical Exam  Vitals and nursing note reviewed.   HENT:      Head: Normocephalic and atraumatic.      Nose: No congestion.      Mouth/Throat:      Pharynx: Oropharyngeal exudate present.   Eyes:      Extraocular Movements: Extraocular movements intact.      Conjunctiva/sclera: Conjunctivae normal.   Cardiovascular:      Rate and Rhythm: Normal rate and regular rhythm.   Pulmonary:      Effort: Pulmonary effort is normal.      Breath sounds: Rales present.   Abdominal:      General: There is no distension.      Tenderness: There is no abdominal tenderness. There is no guarding or rebound.   Musculoskeletal:      Cervical back: No tenderness.      Right lower leg: No edema.      Left lower leg: No edema.   Skin:     General: Skin is warm and dry.   Neurological:      General: No focal deficit present.      Mental Status: She is alert and oriented to person, place, and time.      Cranial Nerves: No cranial nerve deficit.         Fluids    Intake/Output Summary (Last 24 hours) at 2/11/2022 1148  Last data filed at 2/10/2022 2330  Gross per 24 hour   Intake 1000 ml   Output --   Net 1000 ml       Laboratory  Recent Labs     02/10/22  1518 02/11/22  0850   WBC 4.8 4.5*   RBC 3.97* 3.24*   HEMOGLOBIN 10.1* 8.2*   HEMATOCRIT 32.6* 26.7*   MCV 82.1 82.4   MCH 25.4* 25.3*   MCHC 31.0* 30.7*   RDW 41.1 42.0   PLATELETCT 502* 380   MPV 8.8* 8.7*     Recent Labs     02/10/22  1518 02/11/22  0802   SODIUM 138 135   POTASSIUM 3.5* 3.5*   CHLORIDE 103 102   CO2 22 24   GLUCOSE 97 134*   BUN 9 6*   CREATININE 0.46* 0.36*   CALCIUM 9.4 8.4*     Recent Labs     02/11/22  0106   INR 1.18*               Imaging  CT-SOFT TISSUE NECK WITH   Final Result      1.  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   2.  Foci of consolidation in the right upper lobe consistent with pneumonia with no change from yesterday's CTA chest exam.   3.  Very small focus of scarring with central bronchiectasis at the right apex consistent  with chronic sequela of inflammatory disease.   4.  Mild diffuse thickening of the epiglottis. Consider epiglottitis.      CT-CTA CHEST PULMONARY ARTERY W/ RECONS   Final Result      1.  Negative for pulmonary embolism      2.  Right upper lobe airspace process consistent with pneumonia            US-RUQ   Final Result      1.  Prior cholecystectomy      2.  No biliary dilation           Assessment/Plan  * Pneumonia due to infectious organism- (present on admission)  Assessment & Plan  COVID-19 negative  IV Rocephin    Pharyngitis- (present on admission)  Assessment & Plan  IV Rocephin  Check CT neck      Sepsis (HCC)- (present on admission)  Assessment & Plan  This is Sepsis Present on admission  SIRS criteria identified on my evaluation include: Tachycardia, with heart rate greater than 90 BPM and Leukocytosis, with WBC greater than 12,000  Source - Pneumonia, Pharyngitis  Fluid resuscitation ordered per protocol  IV antibiotics as appropriate for source of sepsis  While organ dysfunction may be noted elsewhere in this problem list or in the chart, degree of organ dysfunction does not meet CMS criteria for severe sepsis        Hypomagnesemia- (present on admission)  Assessment & Plan  IV Mg 2 g   Follow level    Leukopenia- (present on admission)  Assessment & Plan  Follow cbc    Hypokalemia- (present on admission)  Assessment & Plan  Kdur, follow bmp    Anemia- (present on admission)  Assessment & Plan  Follow cbc       VTE prophylaxis: enoxaparin ppx    I have performed a physical exam and reviewed and updated ROS and Plan today (2/11/2022). In review of yesterday's note (2/10/2022), there are no changes except as documented above.

## 2022-02-11 NOTE — ASSESSMENT & PLAN NOTE
This is Sepsis Present on admission  SIRS criteria identified on my evaluation include: Tachycardia, with heart rate greater than 90 BPM and Leukocytosis, with WBC greater than 12,000  Source - Pneumonia, Pharyngitis  Fluid resuscitation ordered per protocol  IV antibiotics as appropriate for source of sepsis  While organ dysfunction may be noted elsewhere in this problem list or in the chart, degree of organ dysfunction does not meet CMS criteria for severe sepsis

## 2022-02-11 NOTE — ED NOTES
Med rec completed per patient  Allergies reviewed    Patient states she has had amoxicillin in the last month, however she does not know how long ago. She is unsure of the dosage and cannot advise when she completed it. She did state she completed the course.

## 2022-02-11 NOTE — DISCHARGE INSTRUCTIONS
Discharge Instructions per PATY Franks-BC    1.  Review your discharge Medication Reconciliation form. You may be provided with new prescriptions or changes to previously prescribed medications.  Be sure to take all of your prescribed medications exactly as directed.  Further questions can be directed to your local pharmacist or primary care provider (PCP).    2. Follow-up with your PCP.  An appointment may have already been scheduled for you.  Please review your discharge paperwork and locate this information.  Please be sure to call your PCP to cancel if you are unable to make this appointment or require schedule changes.  It is critical to to follow-up with your PCP to review hospital records and ensure any further diagnostic work-up, prescription refills, or referrals.     3. ACTIVITIES: After discharge from the hospital, you may resume routine activities including walking and going u/down stairs. However, no strenuous activity. For further clarification, call your PCP     4. DRIVING: You may drive whenever you are off pain medications and are able to perform the activities needed to drive, i.e. turning, bending, twisting, etc.     5. BOWEL FUNCTION: A few patients, after hospitalizations, will develop bowel irregularity. You could also experience constipation due to pain medication and decreased activity level. If you feel this is occurring, please take an over-the-counter laxative (Milk of Magnesia, Ex-Lax, Senokot, etc.) until the problem has resolved.     6. PAIN MEDICATION: You may be given a prescription for pain medication at discharge. Please take these as directed. It is important to remember not to take medications on an empty stomach as this may cause nausea/vomiting.  You can transition from the prescription-strength pain medication to over-the-counter medications as your pain improves, such as tylenol if you are medically cleared to do so.     7. RETURN TO THE ER: Return to the ER if you  develop recurrent chest pain, shortness of breath, bloody sputum, fever of 101.5 or greater, intractable nausea or vomiting, blood in the vomit or urine, intractable pain, new onset inability to ambulate, focal motor weakness, acute vision disturbance, acute speech disturbance, intractable abdominal pain, diffuse watery diarrhea or any other worrisome symptoms. IF YOU DEVELOP DIFFICULTY SWALLOWING OR BREATHING CALL 911      Discharge Instructions    Discharged to home by car with relative. Discharged via wheelchair, hospital escort: Yes.  Special equipment needed: Not Applicable    Be sure to schedule a follow-up appointment with your primary care doctor or any specialists as instructed.     Discharge Plan:   Diet Plan: Discussed  Activity Level: Discussed  Confirmed Symptoms Management: Discussed  Medication Reconciliation Updated: Yes  Influenza Vaccine Indication: Patient Refuses    I understand that a diet low in cholesterol, fat, and sodium is recommended for good health. Unless I have been given specific instructions below for another diet, I accept this instruction as my diet prescription.   Other diet: Regular as tolerated    Special Instructions: None    · Is patient discharged on Warfarin / Coumadin?   No     Depression / Suicide Risk    As you are discharged from this [facility] facility, it is important to learn how to keep safe from harming yourself.    Recognize the warning signs:  · Abrupt changes in personality, positive or negative- including increase in energy   · Giving away possessions  · Change in eating patterns- significant weight changes-  positive or negative  · Change in sleeping patterns- unable to sleep or sleeping all the time   · Unwillingness or inability to communicate  · Depression  · Unusual sadness, discouragement and loneliness  · Talk of wanting to die  · Neglect of personal appearance   · Rebelliousness- reckless behavior  · Withdrawal from people/activities they  love  · Confusion- inability to concentrate     If you or a loved one observes any of these behaviors or has concerns about self-harm, here's what you can do:  · Talk about it- your feelings and reasons for harming yourself  · Remove any means that you might use to hurt yourself (examples: pills, rope, extension cords, firearm)  · Get professional help from the community (Mental Health, Substance Abuse, psychological counseling)  · Do not be alone:Call your Safe Contact- someone whom you trust who will be there for you.  · Call your local CRISIS HOTLINE 533-5431 or 253-236-3970  · Call your local Children's Mobile Crisis Response Team [place] (694) 522-6844 or www.OpTier  · Call the toll free National Suicide Prevention Hotlines   · National Suicide Prevention Lifeline 594-176-DLLQ (6072)  · National Hope Line Network 800-SUICIDE (701-0626)

## 2022-02-11 NOTE — PROGRESS NOTES
Pharmacy Vancomycin Kinetics Note for 2/11/2022     31 y.o. female on Vancomycin day # 1     Vancomycin Indication (AUC Dosing): S. aureus pneumonia    Provider specified end date: 02/24/22    Active Antibiotics (From admission, onward)    Ordered     Ordering Provider       Fri Feb 11, 2022  1:37 AM    02/11/22 0137  vancomycin (VANCOCIN) 750 mg in  mL IVPB  (vancomycin (VANCOCIN) IV (LD + Maintenance))  EVERY 12 HOURS         Kevin Graham M.D.       Fri Feb 11, 2022 12:41 AM    02/11/22 0041  vancomycin (VANCOCIN) 1,250 mg in  mL IVPB  (vancomycin (VANCOCIN) IV (LD + Maintenance))  ONCE         Kevin Graham M.D.       Fri Feb 11, 2022 12:23 AM    02/11/22 0023  cefTRIAXone (Rocephin) syringe 2 g  EVERY 24 HOURS         Kevin Graham M.D.       Fri Feb 11, 2022 12:21 AM    02/11/22 0021  MD Alert...Vancomycin per Pharmacy  (Skin and Soft Tissue Infection (Severe Sepsis))  PHARMACY TO DOSE        Question:  Indication(s) for vancomycin?  Answer:  Pneumonia    Kevin Graham M.D.          Dosing Weight: 52 kg (114 lb 10.2 oz)      Admission History: Admitted on 2/10/2022 for Sepsis ([facility]) [A41.9]  Pertinent history: Patient with no significant PMH. Here with concerns for PNA, patient was COVID + 3 weeks prior to arrival. Patient also reports sore throat. In the ER, the patient became hypotensive and tachycardic after antibiotics. Deicion made to broaden antimicrobials to vancomycin and ceftriaxone due to worsening hemodynamics.    Allergies:     Patient has no known allergies.     Pertinent cultures to date:     Results     Procedure Component Value Units Date/Time    Culture Respiratory W/ GRM STN [[id number]]     Order Status: Completed Specimen: Respirate from Sputum     MRSA By PCR (Amp) [[id number]]     Order Status: Sent Specimen: Respirate from Nares     Blood Culture [[id number]]     Order Status: Sent Specimen: Blood from Peripheral     Blood Culture [[id number]]     Order Status: Sent  "Specimen: Blood from Peripheral     Urinalysis [[id number]]     Order Status: Sent Specimen: Urine, Clean Catch     BLOOD CULTURE x2 [[id number]] Collected: 02/10/22 1859    Order Status: Completed Specimen: Blood from Peripheral Updated: 02/10/22 1931    Narrative:      Per Hospital Policy: Only change Specimen Src: to \"Line\" if  specified by physician order.    BLOOD CULTURE x2 [[id number]] Collected: 02/10/22 1859    Order Status: Completed Specimen: Blood from Peripheral Updated: 02/10/22 1930    Narrative:      Per Hospital Policy: Only change Specimen Src: to \"Line\" if  specified by physician order.    Group A Strep by PCR [[id number]] Collected: 02/10/22 1559    Order Status: Completed Specimen: Throat Updated: 02/10/22 1711     Group A Strep by PCR Not Detected    CoV-2, FLU A/B, and RSV by PCR (2-4 Hours CEPHEID) : Collect NP swab in VTM [[id number]] Collected: 02/10/22 1452    Order Status: Completed Specimen: Respirate Updated: 02/10/22 1552     Influenza virus A RNA Negative     Influenza virus B, PCR Negative     RSV, PCR Negative     SARS-CoV-2 by PCR NotDetected     Comment: PATIENTS: Important information regarding your results and instructions can  be found at [email or web address]   \"After your  Covid-19 Test\"    [facility] providers: PLEASE REFER TO DE-ESCALATION AND RETESTING PROTOCOL  on [email or web address]    **The [facility] GeneXpert Xpress SARS-CoV-2 RT-PCR Test has been made  available for use under the Emergency Use Authorization (EUA) only.          SARS-CoV-2 Source NP Swab          Labs:     CrCl cannot be calculated (Unknown ideal weight.).  Recent Labs     02/10/22  1518   WBC 4.8   NEUTSPOLYS 52.60     Recent Labs     02/10/22  1518   BUN 9   CREATININE 0.46*   ALBUMIN 4.6       Intake/Output Summary (Last 24 hours) at 2/11/2022 0138  Last data filed at 2/10/2022 2330  Gross per 24 hour   Intake 1000 ml   Output --   Net 1000 ml      BP (!) 93/64   Pulse (!) 106   Temp " "37.6 °C (99.7 °F) (Oral)   Resp 18   Ht 1.499 m (4' 11\")   Wt 51.9 kg (114 lb 6.7 oz)   SpO2 94%  Temp (24hrs), Av °C (100.4 °F), Min:37.1 °C (98.7 °F), Max:39.4 °C (102.9 °F)      List concerns for Vancomycin clearance:     Receipt of contrast dye;Abnormal LFTs    Pharmacokinetics: AUC Dosing    AUC kinetics:   Ke (hr ^-1): 0.0841 hr^-1  Half life: 8.24 hr  Clearance: 2.843  Estimated TDD: 1421.5  Estimated Dose: 604  Estimated interval: 10.2    A/P:     -  Vancomycin dose: 750 mg q 12h     -  Next vancomycin level(s): Not currently scheduled, consider after 4th maintenance dose ( @ 0100)    -  Predicted vancomycin AUC from initial AUC test calculator: 528 mg·hr/L      -  Comments: MRSA nares ordered. Please continue to monitor renal function, urine output and vancomycin levels for dosing adjustments. Please also follow cultures and signs of clinical cure for de-escalation of therapy.       Renetta Espinoza, PharmD  "

## 2022-02-11 NOTE — H&P
Hospital Medicine History & Physical Note    Date of Service  2/11/2022    Primary Care Physician  KIARA Marques        Code Status  Full Code    Chief Complaint  Chief Complaint   Patient presents with   • Sore Throat     pt was covid + 3 weeks ago   • Fever   • Headache   • Cough       History of Presenting Illness  Vanessa Ballesteros is a 31 y.o. female with history of familial neutropenia who presented 2/10/2022 with 3 weeks of Covid, sore throat, nonproductive cough.  She is found to have tachycardia and hypotension and a right upper lobe infiltrate concerning for pneumonia.  She is started on empiric antibiotics and referred to the hospitalist for admission.  At bedside she endorses the above, appearing acutely ill.  Complaining of sore throat  I discussed the plan of care with patient.    Review of Systems  Review of Systems   Constitutional: Positive for chills, fever and malaise/fatigue.   HENT: Positive for congestion and sore throat.    Eyes: Negative.    Respiratory: Positive for cough, sputum production and shortness of breath.    Cardiovascular: Negative.    Gastrointestinal: Positive for constipation. Negative for abdominal pain, heartburn, nausea and vomiting.   Genitourinary: Negative for dysuria, flank pain, frequency, hematuria and urgency.   Musculoskeletal: Positive for myalgias.   Skin: Negative.  Negative for rash.   Neurological: Negative.    Endo/Heme/Allergies: Negative.    Psychiatric/Behavioral: Negative.        Past Medical History   has a past medical history of Ileitis, terminal (HCC) and Microcytic anemia (2/14/2016).    Surgical History   has a past surgical history that includes jose a by laparoscopy (12/20/2016) and other abdominal surgery.     Family History  family history includes Cancer in her paternal grandmother; Diabetes in her maternal grandfather; Heart Attack in her mother; Hypertension in her maternal grandmother; Stroke in her mother.   Family history reviewed  with patient. There is no family history that is pertinent to the chief complaint.     Social History   reports that she has quit smoking. Her smoking use included cigarettes. She has a 0.50 pack-year smoking history. She has never used smokeless tobacco. She reports that she does not drink alcohol and does not use drugs.    Allergies  No Known Allergies    Medications  Prior to Admission Medications   Prescriptions Last Dose Informant Patient Reported? Taking?   AMOXICILLIN PO UNKN at CMPLT Patient Yes Yes   Sig: Take 1 Capsule by mouth every day. UNKN Strength or Frequency   Phenylephrine-Pheniramine-DM (THERAFLU COLD & COUGH PO) 2/11/2022 at AM Patient Yes Yes   Sig: Take 1 Dose by mouth every 4 hours.      Facility-Administered Medications: None       Physical Exam  Temp:  [37.1 °C (98.7 °F)-39.4 °C (102.9 °F)] 37.3 °C (99.2 °F)  Pulse:  [106-127] 109  Resp:  [15-27] 18  BP: ()/(38-67) 106/65  SpO2:  [91 %-95 %] 95 %  Blood Pressure: 106/65   Temperature: 37.3 °C (99.2 °F)   Pulse: (!) 109   Respiration: 18   Pulse Oximetry: 95 %       Physical Exam  Vitals and nursing note reviewed.   Constitutional:       General: She is in acute distress.      Appearance: Normal appearance. She is normal weight. She is ill-appearing and toxic-appearing.   HENT:      Head: Normocephalic and atraumatic.      Nose: Nose normal. No congestion or rhinorrhea.      Mouth/Throat:      Mouth: Mucous membranes are moist.      Pharynx: Oropharynx is clear.   Eyes:      Extraocular Movements: Extraocular movements intact.      Conjunctiva/sclera: Conjunctivae normal.      Pupils: Pupils are equal, round, and reactive to light.   Neck:      Vascular: No carotid bruit.   Cardiovascular:      Rate and Rhythm: Regular rhythm. Tachycardia present.      Pulses: Normal pulses.      Heart sounds: Normal heart sounds. No murmur heard.  No gallop.    Pulmonary:      Effort: Respiratory distress present.      Breath sounds: Rhonchi present.  No wheezing or rales.   Abdominal:      General: Abdomen is flat. Bowel sounds are normal. There is no distension.      Palpations: Abdomen is soft. There is no mass.      Tenderness: There is no abdominal tenderness.      Hernia: No hernia is present.   Musculoskeletal:         General: No tenderness or signs of injury.      Cervical back: Normal range of motion and neck supple. No muscular tenderness.   Lymphadenopathy:      Cervical: No cervical adenopathy.   Skin:     Capillary Refill: Capillary refill takes less than 2 seconds.      Coloration: Skin is not jaundiced or pale.      Findings: No bruising.   Neurological:      General: No focal deficit present.      Mental Status: She is alert and oriented to person, place, and time. Mental status is at baseline.      Cranial Nerves: No cranial nerve deficit.      Motor: No weakness.      Coordination: Coordination normal.   Psychiatric:         Mood and Affect: Mood normal.         Thought Content: Thought content normal.         Judgment: Judgment normal.         Laboratory:  Recent Labs     02/10/22  1518   WBC 4.8   RBC 3.97*   HEMOGLOBIN 10.1*   HEMATOCRIT 32.6*   MCV 82.1   MCH 25.4*   MCHC 31.0*   RDW 41.1   PLATELETCT 502*   MPV 8.8*     Recent Labs     02/10/22  1518   SODIUM 138   POTASSIUM 3.5*   CHLORIDE 103   CO2 22   GLUCOSE 97   BUN 9   CREATININE 0.46*   CALCIUM 9.4     Recent Labs     02/10/22  1518   ALTSGPT 231*   ASTSGOT 125*   ALKPHOSPHAT 166*   TBILIRUBIN 0.4   GLUCOSE 97     Recent Labs     02/11/22  0106   INR 1.18*     No results for input(s): NTPROBNP in the last 72 hours.      No results for input(s): TROPONINT in the last 72 hours.    Imaging:  CT-CTA CHEST PULMONARY ARTERY W/ RECONS   Final Result      1.  Negative for pulmonary embolism      2.  Right upper lobe airspace process consistent with pneumonia            US-RUQ   Final Result      1.  Prior cholecystectomy      2.  No biliary dilation          X-Ray:  I have personally  reviewed the images and compared with prior images.    Assessment/Plan:  I anticipate this patient will require at least two midnights for appropriate medical management, necessitating inpatient admission.    * Sepsis (HCC)- (present on admission)  Assessment & Plan  This is Sepsis Present on admission  SIRS criteria identified on my evaluation include: Tachycardia, with heart rate greater than 90 BPM and Leukocytosis, with WBC greater than 12,000  Source is pneumonia versus strep pharyngitis  Fluid resuscitation ordered per protocol  IV antibiotics as appropriate for source of sepsis  While organ dysfunction may be noted elsewhere in this problem list or in the chart, degree of organ dysfunction does not meet CMS criteria for severe sepsis          Pharyngitis due to Streptococcus species  Assessment & Plan  Obtain rapid strep, consider CT neck soft tissues    Pneumonia due to infectious organism  Assessment & Plan  Concern for strep pneumonia given severity of sepsis.  Vancomycin, Rocephin ordered.  Follow-up CBC and blood culture    Hypokalemia  Assessment & Plan  Potassium repletion, follow-up magnesium and chemistry      VTE prophylaxis: SCDs/TEDs

## 2022-02-11 NOTE — ASSESSMENT & PLAN NOTE
CT neck w evidence of epiglottitis without airway obstruction  100% on room air  IV Rocephin  Tolerating diet  Likely related to recent COVID-19 infection; check HIV and Monospot  Procalcitonin remains negative  Low threshold for ENT/anesthesia eval/ICU transfer

## 2022-02-12 ENCOUNTER — PHARMACY VISIT (OUTPATIENT)
Dept: PHARMACY | Facility: MEDICAL CENTER | Age: 32
End: 2022-02-12
Payer: COMMERCIAL

## 2022-02-12 VITALS
DIASTOLIC BLOOD PRESSURE: 56 MMHG | BODY MASS INDEX: 25.2 KG/M2 | SYSTOLIC BLOOD PRESSURE: 85 MMHG | OXYGEN SATURATION: 98 % | HEIGHT: 59 IN | TEMPERATURE: 98 F | WEIGHT: 125 LBS | HEART RATE: 82 BPM | RESPIRATION RATE: 16 BRPM

## 2022-02-12 PROBLEM — J05.10 EPIGLOTTITIS: Status: ACTIVE | Noted: 2022-02-11

## 2022-02-12 PROBLEM — E83.42 HYPOMAGNESEMIA: Status: RESOLVED | Noted: 2022-02-11 | Resolved: 2022-02-12

## 2022-02-12 PROBLEM — A41.9 SEPSIS (HCC): Status: RESOLVED | Noted: 2022-02-11 | Resolved: 2022-02-12

## 2022-02-12 PROBLEM — D72.819 LEUKOPENIA: Status: RESOLVED | Noted: 2022-02-11 | Resolved: 2022-02-12

## 2022-02-12 LAB
ALBUMIN SERPL BCP-MCNC: 3 G/DL (ref 3.2–4.9)
ALBUMIN/GLOB SERPL: 1 G/DL
ALP SERPL-CCNC: 90 U/L (ref 30–99)
ALT SERPL-CCNC: 83 U/L (ref 2–50)
ANION GAP SERPL CALC-SCNC: 10 MMOL/L (ref 7–16)
AST SERPL-CCNC: 23 U/L (ref 12–45)
BASOPHILS # BLD AUTO: 0.7 % (ref 0–1.8)
BASOPHILS # BLD: 0.04 K/UL (ref 0–0.12)
BILIRUB SERPL-MCNC: <0.2 MG/DL (ref 0.1–1.5)
BUN SERPL-MCNC: 7 MG/DL (ref 8–22)
CALCIUM SERPL-MCNC: 8 MG/DL (ref 8.5–10.5)
CHLORIDE SERPL-SCNC: 110 MMOL/L (ref 96–112)
CO2 SERPL-SCNC: 22 MMOL/L (ref 20–33)
CREAT SERPL-MCNC: 0.32 MG/DL (ref 0.5–1.4)
EOSINOPHIL # BLD AUTO: 0.26 K/UL (ref 0–0.51)
EOSINOPHIL NFR BLD: 4.4 % (ref 0–6.9)
ERYTHROCYTE [DISTWIDTH] IN BLOOD BY AUTOMATED COUNT: 41.3 FL (ref 35.9–50)
GLOBULIN SER CALC-MCNC: 3 G/DL (ref 1.9–3.5)
GLUCOSE SERPL-MCNC: 105 MG/DL (ref 65–99)
HCT VFR BLD AUTO: 25.1 % (ref 37–47)
HETEROPH AB SER QL: NEGATIVE
HGB BLD-MCNC: 7.7 G/DL (ref 12–16)
HIV 1+2 AB+HIV1 P24 AG SERPL QL IA: NORMAL
IMM GRANULOCYTES # BLD AUTO: 0.02 K/UL (ref 0–0.11)
IMM GRANULOCYTES NFR BLD AUTO: 0.3 % (ref 0–0.9)
LYMPHOCYTES # BLD AUTO: 1.97 K/UL (ref 1–4.8)
LYMPHOCYTES NFR BLD: 33.4 % (ref 22–41)
MCH RBC QN AUTO: 25.4 PG (ref 27–33)
MCHC RBC AUTO-ENTMCNC: 30.7 G/DL (ref 33.6–35)
MCV RBC AUTO: 82.8 FL (ref 81.4–97.8)
MONOCYTES # BLD AUTO: 1.29 K/UL (ref 0–0.85)
MONOCYTES NFR BLD AUTO: 21.9 % (ref 0–13.4)
NEUTROPHILS # BLD AUTO: 2.32 K/UL (ref 2–7.15)
NEUTROPHILS NFR BLD: 39.3 % (ref 44–72)
NRBC # BLD AUTO: 0 K/UL
NRBC BLD-RTO: 0 /100 WBC
PLATELET # BLD AUTO: 392 K/UL (ref 164–446)
PMV BLD AUTO: 9.1 FL (ref 9–12.9)
POTASSIUM SERPL-SCNC: 3.9 MMOL/L (ref 3.6–5.5)
PROT SERPL-MCNC: 6 G/DL (ref 6–8.2)
RBC # BLD AUTO: 3.03 M/UL (ref 4.2–5.4)
SODIUM SERPL-SCNC: 142 MMOL/L (ref 135–145)
WBC # BLD AUTO: 5.9 K/UL (ref 4.8–10.8)

## 2022-02-12 PROCEDURE — 86308 HETEROPHILE ANTIBODY SCREEN: CPT

## 2022-02-12 PROCEDURE — 700101 HCHG RX REV CODE 250: Performed by: FAMILY MEDICINE

## 2022-02-12 PROCEDURE — 85025 COMPLETE CBC W/AUTO DIFF WBC: CPT

## 2022-02-12 PROCEDURE — 94669 MECHANICAL CHEST WALL OSCILL: CPT

## 2022-02-12 PROCEDURE — 80053 COMPREHEN METABOLIC PANEL: CPT

## 2022-02-12 PROCEDURE — 36415 COLL VENOUS BLD VENIPUNCTURE: CPT

## 2022-02-12 PROCEDURE — RXMED WILLOW AMBULATORY MEDICATION CHARGE: Performed by: NURSE PRACTITIONER

## 2022-02-12 PROCEDURE — A9270 NON-COVERED ITEM OR SERVICE: HCPCS | Performed by: FAMILY MEDICINE

## 2022-02-12 PROCEDURE — 99239 HOSP IP/OBS DSCHRG MGMT >30: CPT | Performed by: NURSE PRACTITIONER

## 2022-02-12 PROCEDURE — 94760 N-INVAS EAR/PLS OXIMETRY 1: CPT

## 2022-02-12 PROCEDURE — 700102 HCHG RX REV CODE 250 W/ 637 OVERRIDE(OP): Performed by: FAMILY MEDICINE

## 2022-02-12 PROCEDURE — 700105 HCHG RX REV CODE 258: Performed by: FAMILY MEDICINE

## 2022-02-12 PROCEDURE — 87389 HIV-1 AG W/HIV-1&-2 AB AG IA: CPT

## 2022-02-12 PROCEDURE — 700111 HCHG RX REV CODE 636 W/ 250 OVERRIDE (IP): Performed by: FAMILY MEDICINE

## 2022-02-12 RX ORDER — AZITHROMYCIN 500 MG/1
500 TABLET, FILM COATED ORAL DAILY
Qty: 2 TABLET | Refills: 0 | Status: SHIPPED | OUTPATIENT
Start: 2022-02-12

## 2022-02-12 RX ORDER — CEFDINIR 300 MG/1
300 CAPSULE ORAL 2 TIMES DAILY
Qty: 14 CAPSULE | Refills: 0 | Status: SHIPPED | OUTPATIENT
Start: 2022-02-12

## 2022-02-12 RX ADMIN — POTASSIUM CHLORIDE 20 MEQ: 1500 TABLET, EXTENDED RELEASE ORAL at 05:06

## 2022-02-12 RX ADMIN — CEFTRIAXONE SODIUM 2 G: 10 INJECTION, POWDER, FOR SOLUTION INTRAVENOUS at 05:06

## 2022-02-12 RX ADMIN — IBUPROFEN 600 MG: 600 TABLET, FILM COATED ORAL at 01:59

## 2022-02-12 RX ADMIN — SODIUM CHLORIDE: 9 INJECTION, SOLUTION INTRAVENOUS at 04:20

## 2022-02-12 ASSESSMENT — PAIN DESCRIPTION - PAIN TYPE: TYPE: ACUTE PAIN

## 2022-02-12 NOTE — PROGRESS NOTES
4 Eyes Skin Assessment Completed by AMALIA Owen and Jossy MEJIA RN.    Head WDL  Ears WDL  Nose WDL  Mouth WDL  Neck WDL  Breast/Chest WDL  Shoulder Blades WDL  Spine WDL  (R) Arm/Elbow/Hand WDL  (L) Arm/Elbow/Hand WDL  Abdomen WDL  Groin WDL  Scrotum/Coccyx/Buttocks WDL  (R) Leg WDL  (L) Leg WDL  (R) Heel/Foot/Toe WDL  (L) Heel/Foot/Toe WDL          Devices In Places N/A      Interventions In Place Pillows and Pressure Redistribution Mattress    Possible Skin Injury No    Pictures Uploaded Into Epic N/A  Wound Consult Placed N/A  RN Wound Prevention Protocol Ordered No

## 2022-02-12 NOTE — CARE PLAN
The patient is Stable - Low risk of patient condition declining or worsening    Shift Goals  Clinical Goals: Pt will remain free from pain.  Patient Goals: Pt will discharge home.   Family Goals: n/a    Progress made toward(s) clinical / shift goals:  ***      Problem: Pain - Standard  Goal: Alleviation of pain or a reduction in pain to the patient’s comfort goal  Outcome: Progressing     Problem: Care Map:  Optimal Outcome for the Pneumonia Patient  Goal: Collection and monitoring of appropriate tests and labs  Outcome: Progressing     Problem: Respiratory  Goal: Patient will achieve/maintain optimum respiratory ventilation and gas exchange  Outcome: Progressing     Problem: Self Care  Goal: Patient will have the ability to perform ADLs independently or with assistance (bathe, groom, dress, toilet and feed)  Outcome: Progressing     Problem: Knowledge Deficit - Standard  Goal: Patient and family/care givers will demonstrate understanding of plan of care, disease process/condition, diagnostic tests and medications  Outcome: Progressing       Patient is not progressing towards the following goals:

## 2022-02-12 NOTE — DISCHARGE SUMMARY
Discharge Summary    CHIEF COMPLAINT ON ADMISSION  Chief Complaint   Patient presents with   • Sore Throat     pt was covid + 3 weeks ago   • Fever   • Headache   • Cough       Reason for Admission  HA, Sore Throat, Fatigue     Admission Date  2/10/2022    CODE STATUS  Full Code    HPI & HOSPITAL COURSE  This is a 31 y.o. female here with sepsis, pharyngitis and pneumonia.  Of note patient has history of being Covid +3 weeks prior to arrival.  She presented with fever, tachycardia and tachypnea.  CTA of the chest was negative for PE but revealed right upper lobe airspace process consistent with pneumonia.  Strep test was negative.  Heterophile screen was negative.  She had transaminitis on presentation, subsequent v RUQ ultrasound was negative for acute pathology.    She showed improvement with ceftriaxone.  CT scan of the soft tissues of the neck was performed and revealed epiglottitis.  She did receive 1 dose of high-dose steroids.  However she was protecting her airway breathing well not requiring oxygen and tolerating a diet without complication.  Blood cultures are no growth.  MRSA by PCR is negative.  Procalcitonin is been negative x2.  HIV checked and negative.  SARS, influenza and RSV negative.    The day of discharge the patient is threatening to leave AMA.  She says she is much improved.  She is on room air, tolerating regular diet, independent with ambulation.  She continues to have what appears to be pustular tonsillitis/pharyngitis on exam.  She has no additional rashes to suggest coxsackievirus.  Negative blood cultures and procalcitonin however is reassuring.  This could be a new viral etiology versus a post viral sequelae of COVID-19.    She will be given continuation of her antibiotics to cover community-acquired pneumonia as well as bacterial pharyngitis.  I have counseled her extensively on calling 911 if she develops any worsening of her ability to breathe, swallow or adequately take  p.o.    Therefore, she is discharged in good and stable condition to home with close outpatient follow-up.    The patient met 2-midnight criteria for an inpatient stay at the time of discharge.    Discharge Date  2/12/2022    FOLLOW UP ITEMS POST DISCHARGE  PCP follow-up    DISCHARGE DIAGNOSES  Principal Problem:    Pneumonia due to infectious organism POA: Yes  Active Problems:    Epiglottitis POA: Yes    Anemia POA: Yes  Resolved Problems:    Hypokalemia POA: Yes    Sepsis (HCC) POA: Yes    Leukopenia POA: Yes    Hypomagnesemia POA: Yes      FOLLOW UP  No future appointments.  No follow-up provider specified.    MEDICATIONS ON DISCHARGE     Medication List      Start taking these medications      Instructions   azithromycin 500 MG tablet  Commonly known as: ZITHROMAX   Doctor's comments: FIRST DOSE 2/12  Take 1 Tablet by mouth every day.  Dose: 500 mg     cefdinir 300 MG Caps  Commonly known as: OMNICEF   Doctor's comments: FIRST DOSE 2/13  Take 1 Capsule by mouth 2 times a day.  Dose: 300 mg     mag hydrox-al hydrox-simeth-diphenhydrAMINE-lidocaine   Take 30 mL by mouth every 6 hours as needed.  Dose: 30 mL     naproxen 500 MG Tabs  Commonly known as: NAPROSYN   Take 1 Tablet by mouth 2 times a day with meals.  Dose: 500 mg     sore throat spray 1.4 % Liqd  Commonly known as: CHLORASEPTIC   Use 1 Spray in the mouth or throat every 2 hours as needed.  Dose: 1 Spray        Stop taking these medications    AMOXICILLIN PO     THERAFLU COLD & COUGH PO            Allergies  No Known Allergies    DIET  Orders Placed This Encounter   Procedures   • Diet Order Diet: Regular     Standing Status:   Standing     Number of Occurrences:   1     Order Specific Question:   Diet:     Answer:   Regular [1]       ACTIVITY  As tolerated.  Weight bearing as tolerated    LABORATORY  Lab Results   Component Value Date    SODIUM 142 02/12/2022    POTASSIUM 3.9 02/12/2022    CHLORIDE 110 02/12/2022    CO2 22 02/12/2022    GLUCOSE 105 (H)  02/12/2022    BUN 7 (L) 02/12/2022    CREATININE 0.32 (L) 02/12/2022        Lab Results   Component Value Date    WBC 5.9 02/12/2022    HEMOGLOBIN 7.7 (L) 02/12/2022    HEMATOCRIT 25.1 (L) 02/12/2022    PLATELETCT 392 02/12/2022        Total time of the discharge process exceeds 38 minutes.

## 2022-02-12 NOTE — PROGRESS NOTES
Pt alert and oriented x 4. Denies pain or nausea. Up self with a steady gait. O2 sats stable on RA. Voiding well. States she is currently menstruating. Asking when she can go home. Reviewed plan of care. Bilateral PIVs flushing well. Saline locked. Resting comfortably at this time. Call light within reach. Hourly rounding in place.

## 2022-02-12 NOTE — CARE PLAN
The patient is Stable - Low risk of patient condition declining or worsening    Shift Goals  Clinical Goals: Monitor BP  Patient Goals: Pain control    Progress made toward(s) clinical / shift goals: Patient's blood pressure has been right on the border of low, systolics mostly in the low 90's. Patient reports having her pain controlled by PRN ibuprofen    Problem: Pain - Standard  Goal: Alleviation of pain or a reduction in pain to the patient’s comfort goal  Outcome: Progressing     Problem: Respiratory  Goal: Patient will achieve/maintain optimum respiratory ventilation and gas exchange  Outcome: Progressing

## 2022-02-13 LAB
BACTERIA SPEC RESP CULT: NORMAL
SIGNIFICANT IND 70042: NORMAL
SITE SITE: NORMAL
SOURCE SOURCE: NORMAL

## 2022-07-24 ENCOUNTER — HOSPITAL ENCOUNTER (EMERGENCY)
Facility: MEDICAL CENTER | Age: 32
End: 2022-07-24
Attending: EMERGENCY MEDICINE
Payer: MEDICAID

## 2022-07-24 ENCOUNTER — APPOINTMENT (OUTPATIENT)
Dept: RADIOLOGY | Facility: MEDICAL CENTER | Age: 32
End: 2022-07-24
Attending: EMERGENCY MEDICINE
Payer: MEDICAID

## 2022-07-24 VITALS
TEMPERATURE: 98 F | RESPIRATION RATE: 18 BRPM | DIASTOLIC BLOOD PRESSURE: 65 MMHG | SYSTOLIC BLOOD PRESSURE: 95 MMHG | OXYGEN SATURATION: 98 % | BODY MASS INDEX: 22.76 KG/M2 | HEART RATE: 62 BPM | HEIGHT: 59 IN | WEIGHT: 112.88 LBS

## 2022-07-24 DIAGNOSIS — M79.672 FOOT PAIN, LEFT: ICD-10-CM

## 2022-07-24 PROCEDURE — 700111 HCHG RX REV CODE 636 W/ 250 OVERRIDE (IP): Performed by: EMERGENCY MEDICINE

## 2022-07-24 PROCEDURE — 73630 X-RAY EXAM OF FOOT: CPT | Mod: LT

## 2022-07-24 PROCEDURE — 99284 EMERGENCY DEPT VISIT MOD MDM: CPT

## 2022-07-24 PROCEDURE — A9270 NON-COVERED ITEM OR SERVICE: HCPCS | Performed by: EMERGENCY MEDICINE

## 2022-07-24 PROCEDURE — 700102 HCHG RX REV CODE 250 W/ 637 OVERRIDE(OP): Performed by: EMERGENCY MEDICINE

## 2022-07-24 RX ORDER — OXYCODONE HYDROCHLORIDE AND ACETAMINOPHEN 5; 325 MG/1; MG/1
1 TABLET ORAL ONCE
Status: COMPLETED | OUTPATIENT
Start: 2022-07-24 | End: 2022-07-24

## 2022-07-24 RX ORDER — ONDANSETRON 4 MG/1
4 TABLET, ORALLY DISINTEGRATING ORAL ONCE
Status: COMPLETED | OUTPATIENT
Start: 2022-07-24 | End: 2022-07-24

## 2022-07-24 RX ADMIN — OXYCODONE AND ACETAMINOPHEN 1 TABLET: 5; 325 TABLET ORAL at 06:33

## 2022-07-24 RX ADMIN — ONDANSETRON 4 MG: 4 TABLET, ORALLY DISINTEGRATING ORAL at 08:15

## 2022-07-24 ASSESSMENT — FIBROSIS 4 INDEX: FIB4 SCORE: 0.19

## 2022-07-24 NOTE — Clinical Note
Vanessa Ballesteros was seen and treated in our emergency department on 7/24/2022.  She may return to work on 07/25/2022.  Needs to wear the boot and may need crutches until cleared by the orthopedic surgeon.     If you have any questions or concerns, please don't hesitate to call.      Anson Narvaez M.D. Bactrim Pregnancy And Lactation Text: This medication is Pregnancy Category D and is known to cause fetal risk.  It is also excreted in breast milk. Doxepin Pregnancy And Lactation Text: This medication is Pregnancy Category C and it isn't known if it is safe during pregnancy. It is also excreted in breast milk and breast feeding isn't recommended. High Dose Vitamin A Counseling: Side effects reviewed, pt to contact office should one occur. Opioid Pregnancy And Lactation Text: These medications can lead to premature delivery and should be avoided during pregnancy. These medications are also present in breast milk in small amounts. Gabapentin Pregnancy And Lactation Text: This medication is Pregnancy Category C and isn't considered safe during pregnancy. It is excreted in breast milk. Rituxan Pregnancy And Lactation Text: This medication is Pregnancy Category C and it isn't know if it is safe during pregnancy. It is unknown if this medication is excreted in breast milk but similar antibodies are known to be excreted. Hydroquinone Pregnancy And Lactation Text: This medication has not been assigned a Pregnancy Risk Category but animal studies failed to show danger with the topical medication. It is unknown if the medication is excreted in breast milk. Birth Control Pills Pregnancy And Lactation Text: This medication should be avoided if pregnant and for the first 30 days post-partum. Azathioprine Pregnancy And Lactation Text: This medication is Pregnancy Category D and isn't considered safe during pregnancy. It is unknown if this medication is excreted in breast milk. Sarecycline Counseling: Patient advised regarding possible photosensitivity and discoloration of the teeth, skin, lips, tongue and gums.  Patient instructed to avoid sunlight, if possible.  When exposed to sunlight, patients should wear protective clothing, sunglasses, and sunscreen.  The patient was instructed to call the office immediately if the following severe adverse effects occur:  hearing changes, easy bruising/bleeding, severe headache, or vision changes.  The patient verbalized understanding of the proper use and possible adverse effects of sarecycline.  All of the patient's questions and concerns were addressed. Bactrim Counseling:  I discussed with the patient the risks of sulfa antibiotics including but not limited to GI upset, allergic reaction, drug rash, diarrhea, dizziness, photosensitivity, and yeast infections.  Rarely, more serious reactions can occur including but not limited to aplastic anemia, agranulocytosis, methemoglobinemia, blood dyscrasias, liver or kidney failure, lung infiltrates or desquamative/blistering drug rashes. Tetracycline Counseling: Patient counseled regarding possible photosensitivity and increased risk for sunburn.  Patient instructed to avoid sunlight, if possible.  When exposed to sunlight, patients should wear protective clothing, sunglasses, and sunscreen.  The patient was instructed to call the office immediately if the following severe adverse effects occur:  hearing changes, easy bruising/bleeding, severe headache, or vision changes.  The patient verbalized understanding of the proper use and possible adverse effects of tetracycline.  All of the patient's questions and concerns were addressed. Patient understands to avoid pregnancy while on therapy due to potential birth defects. Cephalexin Counseling: I counseled the patient regarding use of cephalexin as an antibiotic for prophylactic and/or therapeutic purposes. Cephalexin (commonly prescribed under brand name Keflex) is a cephalosporin antibiotic which is active against numerous classes of bacteria, including most skin bacteria. Side effects may include nausea, diarrhea, gastrointestinal upset, rash, hives, yeast infections, and in rare cases, hepatitis, kidney disease, seizures, fever, confusion, neurologic symptoms, and others. Patients with severe allergies to penicillin medications are cautioned that there is about a 10% incidence of cross-reactivity with cephalosporins. When possible, patients with penicillin allergies should use alternatives to cephalosporins for antibiotic therapy. Hydroxyzine Counseling: Patient advised that the medication is sedating and not to drive a car after taking this medication.  Patient informed of potential adverse effects including but not limited to dry mouth, urinary retention, and blurry vision.  The patient verbalized understanding of the proper use and possible adverse effects of hydroxyzine.  All of the patient's questions and concerns were addressed. Isotretinoin Pregnancy And Lactation Text: This medication is Pregnancy Category X and is considered extremely dangerous during pregnancy. It is unknown if it is excreted in breast milk. Spironolactone Counseling: Patient advised regarding risks of diarrhea, abdominal pain, hyperkalemia, birth defects (for female patients), liver toxicity and renal toxicity. The patient may need blood work to monitor liver and kidney function and potassium levels while on therapy. The patient verbalized understanding of the proper use and possible adverse effects of spironolactone.  All of the patient's questions and concerns were addressed. Siliq Counseling:  I discussed with the patient the risks of Siliq including but not limited to new or worsening depression, suicidal thoughts and behavior, immunosuppression, malignancy, posterior leukoencephalopathy syndrome, and serious infections.  The patient understands that monitoring is required including a PPD at baseline and must alert us or the primary physician if symptoms of infection or other concerning signs are noted. There is also a special program designed to monitor depression which is required with Siliq. Opioid Counseling: I discussed with the patient the potential side effects of opioids including but not limited to addiction, altered mental status, and depression. I stressed avoiding alcohol, benzodiazepines, muscle relaxants and sleep aids unless specifically okayed by a physician. The patient verbalized understanding of the proper use and possible adverse effects of opioids. All of the patient's questions and concerns were addressed. They were instructed to flush the remaining pills down the toilet if they did not need them for pain. Hydroquinone Counseling:  Patient advised that medication may result in skin irritation, lightening (hypopigmentation), dryness, and burning.  In the event of skin irritation, the patient was advised to reduce the amount of the drug applied or use it less frequently.  Rarely, spots that are treated with hydroquinone can become darker (pseudoochronosis).  Should this occur, patient instructed to stop medication and call the office. The patient verbalized understanding of the proper use and possible adverse effects of hydroquinone.  All of the patient's questions and concerns were addressed. Gabapentin Counseling: I discussed with the patient the risks of gabapentin including but not limited to dizziness, somnolence, fatigue and ataxia. Azathioprine Counseling:  I discussed with the patient the risks of azathioprine including but not limited to myelosuppression, immunosuppression, hepatotoxicity, lymphoma, and infections.  The patient understands that monitoring is required including baseline LFTs, Creatinine, possible TPMP genotyping and weekly CBCs for the first month and then every 2 weeks thereafter.  The patient verbalized understanding of the proper use and possible adverse effects of azathioprine.  All of the patient's questions and concerns were addressed. Sarecycline Pregnancy And Lactation Text: This medication is Pregnancy Category D and not consider safe during pregnancy. It is also excreted in breast milk. Imiquimod Pregnancy And Lactation Text: This medication is Pregnancy Category C. It is unknown if this medication is excreted in breast milk. Glycopyrrolate Pregnancy And Lactation Text: This medication is Pregnancy Category B and is considered safe during pregnancy. It is unknown if it is excreted breast milk. Topical Retinoid counseling:  Patient advised to apply a pea-sized amount only at bedtime and wait 30 minutes after washing their face before applying.  If too drying, patient may add a non-comedogenic moisturizer. The patient verbalized understanding of the proper use and possible adverse effects of retinoids.  All of the patient's questions and concerns were addressed. Hydroxyzine Pregnancy And Lactation Text: This medication is not safe during pregnancy and should not be taken. It is also excreted in breast milk and breast feeding isn't recommended. Spironolactone Pregnancy And Lactation Text: This medication can cause feminization of the male fetus and should be avoided during pregnancy. The active metabolite is also found in breast milk. Siliq Pregnancy And Lactation Text: The risk during pregnancy and breastfeeding is uncertain with this medication. Hydroxychloroquine Counseling:  I discussed with the patient that a baseline ophthalmologic exam is needed at the start of therapy and every year thereafter while on therapy. A CBC may also be warranted for monitoring.  The side effects of this medication were discussed with the patient, including but not limited to agranulocytosis, aplastic anemia, seizures, rashes, retinopathy, and liver toxicity. Patient instructed to call the office should any adverse effect occur.  The patient verbalized understanding of the proper use and possible adverse effects of Plaquenil.  All the patient's questions and concerns were addressed. Arava Counseling:  Patient counseled regarding adverse effects of Arava including but not limited to nausea, vomiting, abnormalities in liver function tests. Patients may develop mouth sores, rash, diarrhea, and abnormalities in blood counts. The patient understands that monitoring is required including LFTs and blood counts.  There is a rare possibility of scarring of the liver and lung problems that can occur when taking methotrexate. Persistent nausea, loss of appetite, pale stools, dark urine, cough, and shortness of breath should be reported immediately. Patient advised to discontinue Arava treatment and consult with a physician prior to attempting conception. The patient will have to undergo a treatment to eliminate Arava from the body prior to conception. Minoxidil Counseling: Minoxidil is a topical medication which can increase blood flow where it is applied. It is uncertain how this medication increases hair growth. Side effects are uncommon and include stinging and allergic reactions. High Dose Vitamin A Pregnancy And Lactation Text: High dose vitamin A therapy is contraindicated during pregnancy and breast feeding. Imiquimod Counseling:  I discussed with the patient the risks of imiquimod including but not limited to erythema, scaling, itching, weeping, crusting, and pain.  Patient understands that the inflammatory response to imiquimod is variable from person to person and was educated regarded proper titration schedule.  If flu-like symptoms develop, patient knows to discontinue the medication and contact us. Clindamycin Counseling: I counseled the patient regarding use of clindamycin as an antibiotic for prophylactic and/or therapeutic purposes. Clindamycin is active against numerous classes of bacteria, including skin bacteria. Side effects may include nausea, diarrhea, gastrointestinal upset, rash, hives, yeast infections, and in rare cases, colitis. Glycopyrrolate Counseling:  I discussed with the patient the risks of glycopyrrolate including but not limited to skin rash, drowsiness, dry mouth, difficulty urinating, and blurred vision. Cellcept Counseling:  I discussed with the patient the risks of mycophenolate mofetil including but not limited to infection/immunosuppression, GI upset, hypokalemia, hypercholesterolemia, bone marrow suppression, lymphoproliferative disorders, malignancy, GI ulceration/bleed/perforation, colitis, interstitial lung disease, kidney failure, progressive multifocal leukoencephalopathy, and birth defects.  The patient understands that monitoring is required including a baseline creatinine and regular CBC testing. In addition, patient must alert us immediately if symptoms of infection or other concerning signs are noted. Simponi Counseling:  I discussed with the patient the risks of golimumab including but not limited to myelosuppression, immunosuppression, autoimmune hepatitis, demyelinating diseases, lymphoma, and serious infections.  The patient understands that monitoring is required including a PPD at baseline and must alert us or the primary physician if symptoms of infection or other concerning signs are noted. Dupixent Pregnancy And Lactation Text: This medication likely crosses the placenta but the risk for the fetus is uncertain. This medication is excreted in breast milk. SSKI Counseling:  I discussed with the patient the risks of SSKI including but not limited to thyroid abnormalities, metallic taste, GI upset, fever, headache, acne, arthralgias, paraesthesias, lymphadenopathy, easy bleeding, arrhythmias, and allergic reaction. Cephalexin Pregnancy And Lactation Text: This medication is Pregnancy Category B and considered safe during pregnancy.  It is also excreted in breast milk but can be used safely for shorter doses. Enbrel Pregnancy And Lactation Text: This medication is Pregnancy Category B and is considered safe during pregnancy. It is unknown if this medication is excreted in breast milk. Skyrizi Counseling: I discussed with the patient the risks of risankizumab-rzaa including but not limited to immunosuppression, and serious infections.  The patient understands that monitoring is required including a PPD at baseline and must alert us or the primary physician if symptoms of infection or other concerning signs are noted. Clindamycin Pregnancy And Lactation Text: This medication can be used in pregnancy if certain situations. Clindamycin is also present in breast milk. Libtayo Counseling- I discussed with the patient the risks of Libtayo including but not limited to nausea, vomiting, diarrhea, and bone or muscle pain.  The patient verbalized understanding of the proper use and possible adverse effects of Libtayo.  All of the patient's questions and concerns were addressed. Mirvaso Counseling: Mirvaso is a topical medication which can decrease superficial blood flow where applied. Side effects are uncommon and include stinging, redness and allergic reactions. Albendazole Counseling:  I discussed with the patient the risks of albendazole including but not limited to cytopenia, kidney damage, nausea/vomiting and severe allergy.  The patient understands that this medication is being used in an off-label manner. Fluconazole Counseling:  Patient counseled regarding adverse effects of fluconazole including but not limited to headache, diarrhea, nausea, upset stomach, liver function test abnormalities, taste disturbance, and stomach pain.  There is a rare possibility of liver failure that can occur when taking fluconazole.  The patient understands that monitoring of LFTs and kidney function test may be required, especially at baseline. The patient verbalized understanding of the proper use and possible adverse effects of fluconazole.  All of the patient's questions and concerns were addressed. Tazorac Pregnancy And Lactation Text: This medication is not safe during pregnancy. It is unknown if this medication is excreted in breast milk. Cyclosporine Counseling:  I discussed with the patient the risks of cyclosporine including but not limited to hypertension, gingival hyperplasia,myelosuppression, immunosuppression, liver damage, kidney damage, neurotoxicity, lymphoma, and serious infections. The patient understands that monitoring is required including baseline blood pressure, CBC, CMP, lipid panel and uric acid, and then 1-2 times monthly CMP and blood pressure. Carac Counseling:  I discussed with the patient the risks of Carac including but not limited to erythema, scaling, itching, weeping, crusting, and pain. Enbrel Counseling:  I discussed with the patient the risks of etanercept including but not limited to myelosuppression, immunosuppression, autoimmune hepatitis, demyelinating diseases, lymphoma, and infections.  The patient understands that monitoring is required including a PPD at baseline and must alert us or the primary physician if symptoms of infection or other concerning signs are noted. Clofazimine Counseling:  I discussed with the patient the risks of clofazimine including but not limited to skin and eye pigmentation, liver damage, nausea/vomiting, gastrointestinal bleeding and allergy. Sski Pregnancy And Lactation Text: This medication is Pregnancy Category D and isn't considered safe during pregnancy. It is excreted in breast milk. Cyclophosphamide Counseling:  I discussed with the patient the risks of cyclophosphamide including but not limited to hair loss, hormonal abnormalities, decreased fertility, abdominal pain, diarrhea, nausea and vomiting, bone marrow suppression and infection. The patient understands that monitoring is required while taking this medication. Benzoyl Peroxide Counseling: Patient counseled that medicine may cause skin irritation and bleach clothing.  In the event of skin irritation, the patient was advised to reduce the amount of the drug applied or use it less frequently.   The patient verbalized understanding of the proper use and possible adverse effects of benzoyl peroxide.  All of the patient's questions and concerns were addressed. Humira Counseling:  I discussed with the patient the risks of adalimumab including but not limited to myelosuppression, immunosuppression, autoimmune hepatitis, demyelinating diseases, lymphoma, and serious infections.  The patient understands that monitoring is required including a PPD at baseline and must alert us or the primary physician if symptoms of infection or other concerning signs are noted. Doxycycline Counseling:  Patient counseled regarding possible photosensitivity and increased risk for sunburn.  Patient instructed to avoid sunlight, if possible.  When exposed to sunlight, patients should wear protective clothing, sunglasses, and sunscreen.  The patient was instructed to call the office immediately if the following severe adverse effects occur:  hearing changes, easy bruising/bleeding, severe headache, or vision changes.  The patient verbalized understanding of the proper use and possible adverse effects of doxycycline.  All of the patient's questions and concerns were addressed. Thalidomide Counseling: I discussed with the patient the risks of thalidomide including but not limited to birth defects, anxiety, weakness, chest pain, dizziness, cough and severe allergy. Albendazole Pregnancy And Lactation Text: This medication is Pregnancy Category C and it isn't known if it is safe during pregnancy. It is also excreted in breast milk. Hydroxychloroquine Pregnancy And Lactation Text: This medication has been shown to cause fetal harm but it isn't assigned a Pregnancy Risk Category. There are small amounts excreted in breast milk. Fluconazole Pregnancy And Lactation Text: This medication is Pregnancy Category C and it isn't know if it is safe during pregnancy. It is also excreted in breast milk. Cyclophosphamide Pregnancy And Lactation Text: This medication is Pregnancy Category D and it isn't considered safe during pregnancy. This medication is excreted in breast milk. Tazorac Counseling:  Patient advised that medication is irritating and drying.  Patient may need to apply sparingly and wash off after an hour before eventually leaving it on overnight.  The patient verbalized understanding of the proper use and possible adverse effects of tazorac.  All of the patient's questions and concerns were addressed. Arava Pregnancy And Lactation Text: This medication is Pregnancy Category X and is absolutely contraindicated during pregnancy. It is unknown if it is excreted in breast milk. Benzoyl Peroxide Pregnancy And Lactation Text: This medication is Pregnancy Category C. It is unknown if benzoyl peroxide is excreted in breast milk. Tranexamic Acid Counseling:  Patient advised of the small risk of bleeding problems with tranexamic acid. They were also instructed to call if they developed any nausea, vomiting or diarrhea. All of the patient's questions and concerns were addressed. Colchicine Counseling:  Patient counseled regarding adverse effects including but not limited to stomach upset (nausea, vomiting, stomach pain, or diarrhea).  Patient instructed to limit alcohol consumption while taking this medication.  Colchicine may reduce blood counts especially with prolonged use.  The patient understands that monitoring of kidney function and blood counts may be required, especially at baseline. The patient verbalized understanding of the proper use and possible adverse effects of colchicine.  All of the patient's questions and concerns were addressed. Calcipotriene Counseling:  I discussed with the patient the risks of calcipotriene including but not limited to erythema, scaling, itching, and irritation. Picato Counseling:  I discussed with the patient the risks of Picato including but not limited to erythema, scaling, itching, weeping, crusting, and pain. Griseofulvin Counseling:  I discussed with the patient the risks of griseofulvin including but not limited to photosensitivity, cytopenia, liver damage, nausea/vomiting and severe allergy.  The patient understands that this medication is best absorbed when taken with a fatty meal (e.g., ice cream or french fries). Niacinamide Counseling: I recommended taking niacin or niacinamide, also know as vitamin B3, twice daily. Recent evidence suggests that taking vitamin B3 (500 mg twice daily) can reduce the risk of actinic keratoses and non-melanoma skin cancers. Side effects of vitamin B3 include flushing and headache. Doxycycline Pregnancy And Lactation Text: This medication is Pregnancy Category D and not consider safe during pregnancy. It is also excreted in breast milk but is considered safe for shorter treatment courses. Methotrexate Counseling:  Patient counseled regarding adverse effects of methotrexate including but not limited to nausea, vomiting, abnormalities in liver function tests. Patients may develop mouth sores, rash, diarrhea, and abnormalities in blood counts. The patient understands that monitoring is required including LFT's and blood counts.  There is a rare possibility of scarring of the liver and lung problems that can occur when taking methotrexate. Persistent nausea, loss of appetite, pale stools, dark urine, cough, and shortness of breath should be reported immediately. Patient advised to discontinue methotrexate treatment at least three months before attempting to become pregnant.  I discussed the need for folate supplements while taking methotrexate.  These supplements can decrease side effects during methotrexate treatment. The patient verbalized understanding of the proper use and possible adverse effects of methotrexate.  All of the patient's questions and concerns were addressed. Topical Clindamycin Pregnancy And Lactation Text: This medication is Pregnancy Category B and is considered safe during pregnancy. It is unknown if it is excreted in breast milk. Ivermectin Counseling:  Patient instructed to take medication on an empty stomach with a full glass of water.  Patient informed of potential adverse effects including but not limited to nausea, diarrhea, dizziness, itching, and swelling of the extremities or lymph nodes.  The patient verbalized understanding of the proper use and possible adverse effects of ivermectin.  All of the patient's questions and concerns were addressed. Tranexamic Acid Pregnancy And Lactation Text: It is unknown if this medication is safe during pregnancy or breast feeding. Mirvaso Pregnancy And Lactation Text: This medication has not been assigned a Pregnancy Risk Category. It is unknown if the medication is excreted in breast milk. Topical Clindamycin Counseling: Patient counseled that this medication may cause skin irritation or allergic reactions.  In the event of skin irritation, the patient was advised to reduce the amount of the drug applied or use it less frequently.   The patient verbalized understanding of the proper use and possible adverse effects of clindamycin.  All of the patient's questions and concerns were addressed. Ilumya Counseling: I discussed with the patient the risks of tildrakizumab including but not limited to immunosuppression, malignancy, posterior leukoencephalopathy syndrome, and serious infections.  The patient understands that monitoring is required including a PPD at baseline and must alert us or the primary physician if symptoms of infection or other concerning signs are noted. Libtayo Pregnancy And Lactation Text: This medication is contraindicated in pregnancy and when breast feeding. Carac Pregnancy And Lactation Text: This medication is Pregnancy Category X and contraindicated in pregnancy and in women who may become pregnant. It is unknown if this medication is excreted in breast milk. Griseofulvin Pregnancy And Lactation Text: This medication is Pregnancy Category X and is known to cause serious birth defects. It is unknown if this medication is excreted in breast milk but breast feeding should be avoided. Erythromycin Counseling:  I discussed with the patient the risks of erythromycin including but not limited to GI upset, allergic reaction, drug rash, diarrhea, increase in liver enzymes, and yeast infections. Stelara Counseling:  I discussed with the patient the risks of ustekinumab including but not limited to immunosuppression, malignancy, posterior leukoencephalopathy syndrome, and serious infections.  The patient understands that monitoring is required including a PPD at baseline and must alert us or the primary physician if symptoms of infection or other concerning signs are noted. Cyclosporine Pregnancy And Lactation Text: This medication is Pregnancy Category C and it isn't know if it is safe during pregnancy. This medication is excreted in breast milk. Dapsone Counseling: I discussed with the patient the risks of dapsone including but not limited to hemolytic anemia, agranulocytosis, rashes, methemoglobinemia, kidney failure, peripheral neuropathy, headaches, GI upset, and liver toxicity.  Patients who start dapsone require monitoring including baseline LFTs and weekly CBCs for the first month, then every month thereafter.  The patient verbalized understanding of the proper use and possible adverse effects of dapsone.  All of the patient's questions and concerns were addressed. Metronidazole Counseling:  I discussed with the patient the risks of metronidazole including but not limited to seizures, nausea/vomiting, a metallic taste in the mouth, nausea/vomiting and severe allergy. Nsaids Counseling: NSAID Counseling: I discussed with the patient that NSAIDs should be taken with food. Prolonged use of NSAIDs can result in the development of stomach ulcers.  Patient advised to stop taking NSAIDs if abdominal pain occurs.  The patient verbalized understanding of the proper use and possible adverse effects of NSAIDs.  All of the patient's questions and concerns were addressed. Protopic Counseling: Patient may experience a mild burning sensation during topical application. Protopic is not approved in children less than 2 years of age. There have been case reports of hematologic and skin malignancies in patients using topical calcineurin inhibitors although causality is questionable. Topical Sulfur Applications Pregnancy And Lactation Text: This medication is Pregnancy Category C and has an unknown safety profile during pregnancy. It is unknown if this topical medication is excreted in breast milk. Prednisone Counseling:  I discussed with the patient the risks of prolonged use of prednisone including but not limited to weight gain, insomnia, osteoporosis, mood changes, diabetes, susceptibility to infection, glaucoma and high blood pressure.  In cases where prednisone use is prolonged, patients should be monitored with blood pressure checks, serum glucose levels and an eye exam.  Additionally, the patient may need to be placed on GI prophylaxis, PCP prophylaxis, and calcium and vitamin D supplementation and/or a bisphosphonate.  The patient verbalized understanding of the proper use and the possible adverse effects of prednisone.  All of the patient's questions and concerns were addressed. Valtrex Counseling: I discussed with the patient the risks of valacyclovir including but not limited to kidney damage, nausea, vomiting and severe allergy.  The patient understands that if the infection seems to be worsening or is not improving, they are to call. 5-Fu Counseling: 5-Fluorouracil Counseling:  I discussed with the patient the risks of 5-fluorouracil including but not limited to erythema, scaling, itching, weeping, crusting, and pain. Itraconazole Counseling:  I discussed with the patient the risks of itraconazole including but not limited to liver damage, nausea/vomiting, neuropathy, and severe allergy.  The patient understands that this medication is best absorbed when taken with acidic beverages such as non-diet cola or ginger ale.  The patient understands that monitoring is required including baseline LFTs and repeat LFTs at intervals.  The patient understands that they are to contact us or the primary physician if concerning signs are noted. Erythromycin Pregnancy And Lactation Text: This medication is Pregnancy Category B and is considered safe during pregnancy. It is also excreted in breast milk. Ketoconazole Counseling:   Patient counseled regarding improving absorption with orange juice.  Adverse effects include but are not limited to breast enlargement, headache, diarrhea, nausea, upset stomach, liver function test abnormalities, taste disturbance, and stomach pain.  There is a rare possibility of liver failure that can occur when taking ketoconazole. The patient understands that monitoring of LFTs may be required, especially at baseline. The patient verbalized understanding of the proper use and possible adverse effects of ketoconazole.  All of the patient's questions and concerns were addressed. Niacinamide Pregnancy And Lactation Text: These medications are considered safe during pregnancy. Calcipotriene Pregnancy And Lactation Text: This medication has not been proven safe during pregnancy. It is unknown if this medication is excreted in breast milk. Oral Minoxidil Pregnancy And Lactation Text: This medication should only be used when clearly needed if you are pregnant, attempting to become pregnant or breast feeding. Infliximab Counseling:  I discussed with the patient the risks of infliximab including but not limited to myelosuppression, immunosuppression, autoimmune hepatitis, demyelinating diseases, lymphoma, and serious infections.  The patient understands that monitoring is required including a PPD at baseline and must alert us or the primary physician if symptoms of infection or other concerning signs are noted. Valtrex Pregnancy And Lactation Text: this medication is Pregnancy Category B and is considered safe during pregnancy. This medication is not directly found in breast milk but it's metabolite acyclovir is present. Taltz Counseling: I discussed with the patient the risks of ixekizumab including but not limited to immunosuppression, serious infections, worsening of inflammatory bowel disease and drug reactions.  The patient understands that monitoring is required including a PPD at baseline and must alert us or the primary physician if symptoms of infection or other concerning signs are noted. Topical Sulfur Applications Counseling: Topical Sulfur Counseling: Patient counseled that this medication may cause skin irritation or allergic reactions.  In the event of skin irritation, the patient was advised to reduce the amount of the drug applied or use it less frequently.   The patient verbalized understanding of the proper use and possible adverse effects of topical sulfur application.  All of the patient's questions and concerns were addressed. Methotrexate Pregnancy And Lactation Text: This medication is Pregnancy Category X and is known to cause fetal harm. This medication is excreted in breast milk. Detail Level: Simple Minocycline Counseling: Patient advised regarding possible photosensitivity and discoloration of the teeth, skin, lips, tongue and gums.  Patient instructed to avoid sunlight, if possible.  When exposed to sunlight, patients should wear protective clothing, sunglasses, and sunscreen.  The patient was instructed to call the office immediately if the following severe adverse effects occur:  hearing changes, easy bruising/bleeding, severe headache, or vision changes.  The patient verbalized understanding of the proper use and possible adverse effects of minocycline.  All of the patient's questions and concerns were addressed. Drysol Counseling:  I discussed with the patient the risks of drysol/aluminum chloride including but not limited to skin rash, itching, irritation, burning. Dutasteride Counseling: Dustasteride Counseling:  I discussed with the patient the risks of use of dutasteride including but not limited to decreased libido, decreased ejaculate volume, and gynecomastia. Women who can become pregnant should not handle medication.  All of the patient's questions and concerns were addressed. Include Pregnancy/Lactation Warning?: No Cimzia Counseling:  I discussed with the patient the risks of Cimzia including but not limited to immunosuppression, allergic reactions and infections.  The patient understands that monitoring is required including a PPD at baseline and must alert us or the primary physician if symptoms of infection or other concerning signs are noted. Ketoconazole Pregnancy And Lactation Text: This medication is Pregnancy Category C and it isn't know if it is safe during pregnancy. It is also excreted in breast milk and breast feeding isn't recommended. Otezla Counseling: The side effects of Otezla were discussed with the patient, including but not limited to worsening or new depression, weight loss, diarrhea, nausea, upper respiratory tract infection, and headache. Patient instructed to call the office should any adverse effect occur.  The patient verbalized understanding of the proper use and possible adverse effects of Otezla.  All the patient's questions and concerns were addressed. Winlevi Counseling:  I discussed with the patient the risks of topical clascoterone including but not limited to erythema, scaling, itching, and stinging. Patient voiced their understanding. Acitretin Counseling:  I discussed with the patient the risks of acitretin including but not limited to hair loss, dry lips/skin/eyes, liver damage, hyperlipidemia, depression/suicidal ideation, photosensitivity.  Serious rare side effects can include but are not limited to pancreatitis, pseudotumor cerebri, bony changes, clot formation/stroke/heart attack.  Patient understands that alcohol is contraindicated since it can result in liver toxicity and significantly prolong the elimination of the drug by many years. Dutasteride Pregnancy And Lactation Text: This medication is absolutely contraindicated in women, especially during pregnancy and breast feeding. Feminization of male fetuses is possible if taking while pregnant. Drysol Pregnancy And Lactation Text: This medication is considered safe during pregnancy and breast feeding. Metronidazole Pregnancy And Lactation Text: This medication is Pregnancy Category B and considered safe during pregnancy.  It is also excreted in breast milk. Protopic Pregnancy And Lactation Text: This medication is Pregnancy Category C. It is unknown if this medication is excreted in breast milk when applied topically. Nsaids Pregnancy And Lactation Text: These medications are considered safe up to 30 weeks gestation. It is excreted in breast milk. Dapsone Pregnancy And Lactation Text: This medication is Pregnancy Category C and is not considered safe during pregnancy or breast feeding. Wartpeel Counseling:  I discussed with the patient the risks of Wartpeel including but not limited to erythema, scaling, itching, weeping, crusting, and pain. Cimzia Pregnancy And Lactation Text: This medication crosses the placenta but can be considered safe in certain situations. Cimzia may be excreted in breast milk. Otezla Pregnancy And Lactation Text: This medication is Pregnancy Category C and it isn't known if it is safe during pregnancy. It is unknown if it is excreted in breast milk. Tremfya Counseling: I discussed with the patient the risks of guselkumab including but not limited to immunosuppression, serious infections, and drug reactions.  The patient understands that monitoring is required including a PPD at baseline and must alert us or the primary physician if symptoms of infection or other concerning signs are noted. Terbinafine Counseling: Patient counseling regarding adverse effects of terbinafine including but not limited to headache, diarrhea, rash, upset stomach, liver function test abnormalities, itching, taste/smell disturbance, nausea, abdominal pain, and flatulence.  There is a rare possibility of liver failure that can occur when taking terbinafine.  The patient understands that a baseline LFT and kidney function test may be required. The patient verbalized understanding of the proper use and possible adverse effects of terbinafine.  All of the patient's questions and concerns were addressed. Odomzo Counseling- I discussed with the patient the risks of Odomzo including but not limited to nausea, vomiting, diarrhea, constipation, weight loss, changes in the sense of taste, decreased appetite, muscle spasms, and hair loss.  The patient verbalized understanding of the proper use and possible adverse effects of Odomzo.  All of the patient's questions and concerns were addressed. Rhofade Counseling: Rhofade is a topical medication which can decrease superficial blood flow where applied. Side effects are uncommon and include stinging, redness and allergic reactions. Xelfélixz Pregnancy And Lactation Text: This medication is Pregnancy Category D and is not considered safe during pregnancy.  The risk during breast feeding is also uncertain. Acitretin Pregnancy And Lactation Text: This medication is Pregnancy Category X and should not be given to women who are pregnant or may become pregnant in the future. This medication is excreted in breast milk. Solaraze Pregnancy And Lactation Text: This medication is Pregnancy Category B and is considered safe. There is some data to suggest avoiding during the third trimester. It is unknown if this medication is excreted in breast milk. Terbinafine Pregnancy And Lactation Text: This medication is Pregnancy Category B and is considered safe during pregnancy. It is also excreted in breast milk and breast feeding isn't recommended. Quinolones Counseling:  I discussed with the patient the risks of fluoroquinolones including but not limited to GI upset, allergic reaction, drug rash, diarrhea, dizziness, photosensitivity, yeast infections, liver function test abnormalities, tendonitis/tendon rupture. Cosentyx Counseling:  I discussed with the patient the risks of Cosentyx including but not limited to worsening of Crohn's disease, immunosuppression, allergic reactions and infections.  The patient understands that monitoring is required including a PPD at baseline and must alert us or the primary physician if symptoms of infection or other concerning signs are noted. Oxybutynin Counseling:  I discussed with the patient the risks of oxybutynin including but not limited to skin rash, drowsiness, dry mouth, difficulty urinating, and blurred vision. Xolair Counseling:  Patient informed of potential adverse effects including but not limited to fever, muscle aches, rash and allergic reactions.  The patient verbalized understanding of the proper use and possible adverse effects of Xolair.  All of the patient's questions and concerns were addressed. Bexarotene Counseling:  I discussed with the patient the risks of bexarotene including but not limited to hair loss, dry lips/skin/eyes, liver abnormalities, hyperlipidemia, pancreatitis, depression/suicidal ideation, photosensitivity, drug rash/allergic reactions, hypothyroidism, anemia, leukopenia, infection, cataracts, and teratogenicity.  Patient understands that they will need regular blood tests to check lipid profile, liver function tests, white blood cell count, thyroid function tests and pregnancy test if applicable. Zyclara Counseling:  I discussed with the patient the risks of imiquimod including but not limited to erythema, scaling, itching, weeping, crusting, and pain.  Patient understands that the inflammatory response to imiquimod is variable from person to person and was educated regarded proper titration schedule.  If flu-like symptoms develop, patient knows to discontinue the medication and contact us. Dupixent Counseling: I discussed with the patient the risks of dupilumab including but not limited to eye infection and irritation, cold sores, injection site reactions, worsening of asthma, allergic reactions and increased risk of parasitic infection.  Live vaccines should be avoided while taking dupilumab. Dupilumab will also interact with certain medications such as warfarin and cyclosporine. The patient understands that monitoring is required and they must alert us or the primary physician if symptoms of infection or other concerning signs are noted. Xeljanz Counseling: I discussed with the patient the risks of Xeljanz therapy including increased risk of infection, liver issues, headache, diarrhea, or cold symptoms. Live vaccines should be avoided. They were instructed to call if they have any problems. Solaraze Counseling:  I discussed with the patient the risks of Solaraze including but not limited to erythema, scaling, itching, weeping, crusting, and pain. Winlevi Pregnancy And Lactation Text: This medication is considered safe during pregnancy and breastfeeding. Erivedge Counseling- I discussed with the patient the risks of Erivedge including but not limited to nausea, vomiting, diarrhea, constipation, weight loss, changes in the sense of taste, decreased appetite, muscle spasms, and hair loss.  The patient verbalized understanding of the proper use and possible adverse effects of Erivedge.  All of the patient's questions and concerns were addressed. Elidel Counseling: Patient may experience a mild burning sensation during topical application. Elidel is not approved in children less than 2 years of age. There have been case reports of hematologic and skin malignancies in patients using topical calcineurin inhibitors although causality is questionable. Oral Minoxidil Counseling- I discussed with the patient the risks of oral minoxidil including but not limited to shortness of breath, swelling of the feet or ankles, dizziness, lightheadedness, unwanted hair growth and allergic reaction.  The patient verbalized understanding of the proper use and possible adverse effects of oral minoxidil.  All of the patient's questions and concerns were addressed. Finasteride Pregnancy And Lactation Text: This medication is absolutely contraindicated during pregnancy. It is unknown if it is excreted in breast milk. Azithromycin Counseling:  I discussed with the patient the risks of azithromycin including but not limited to GI upset, allergic reaction, drug rash, diarrhea, and yeast infections. Isotretinoin Counseling: Patient should get monthly blood tests, not donate blood, not drive at night if vision affected, not share medication, and not undergo elective surgery for 6 months after tx completed. Side effects reviewed, pt to contact office should one occur. Propranolol Counseling:  I discussed with the patient the risks of propranolol including but not limited to low heart rate, low blood pressure, low blood sugar, restlessness and increased cold sensitivity. They should call the office if they experience any of these side effects. Cimetidine Counseling:  I discussed with the patient the risks of Cimetidine including but not limited to gynecomastia, headache, diarrhea, nausea, drowsiness, arrhythmias, pancreatitis, skin rashes, psychosis, bone marrow suppression and kidney toxicity. Rifampin Counseling: I discussed with the patient the risks of rifampin including but not limited to liver damage, kidney damage, red-orange body fluids, nausea/vomiting and severe allergy. Birth Control Pills Counseling: Birth Control Pill Counseling: I discussed with the patient the potential side effects of OCPs including but not limited to increased risk of stroke, heart attack, thrombophlebitis, deep venous thrombosis, hepatic adenomas, breast changes, GI upset, headaches, and depression.  The patient verbalized understanding of the proper use and possible adverse effects of OCPs. All of the patient's questions and concerns were addressed. Doxepin Counseling:  Patient advised that the medication is sedating and not to drive a car after taking this medication. Patient informed of potential adverse effects including but not limited to dry mouth, urinary retention, and blurry vision.  The patient verbalized understanding of the proper use and possible adverse effects of doxepin.  All of the patient's questions and concerns were addressed. Rituxan Counseling:  I discussed with the patient the risks of Rituxan infusions. Side effects can include infusion reactions, severe drug rashes including mucocutaneous reactions, reactivation of latent hepatitis and other infections and rarely progressive multifocal leukoencephalopathy.  All of the patient's questions and concerns were addressed. Finasteride Counseling:  I discussed with the patient the risks of use of finasteride including but not limited to decreased libido, decreased ejaculate volume, gynecomastia, and depression. Women should not handle medication.  All of the patient's questions and concerns were addressed. Eucrisa Counseling: Patient may experience a mild burning sensation during topical application. Eucrisa is not approved in children less than 2 years of age. Azithromycin Pregnancy And Lactation Text: This medication is considered safe during pregnancy and is also secreted in breast milk. Xolair Pregnancy And Lactation Text: This medication is Pregnancy Category B and is considered safe during pregnancy. This medication is excreted in breast milk. Bexarotene Pregnancy And Lactation Text: This medication is Pregnancy Category X and should not be given to women who are pregnant or may become pregnant. This medication should not be used if you are breast feeding. Rifampin Pregnancy And Lactation Text: This medication is Pregnancy Category C and it isn't know if it is safe during pregnancy. It is also excreted in breast milk and should not be used if you are breast feeding. Propranolol Pregnancy And Lactation Text: This medication is Pregnancy Category C and it isn't known if it is safe during pregnancy. It is excreted in breast milk.

## 2022-07-24 NOTE — ED TRIAGE NOTES
"Patient presents to the ED with her mom with the following complaint:    Chief Complaint   Patient presents with   • Foot Pain     Patient states she felt pop 2 days ago at 7 pm while in the shower. Patient has progressively gotten worse. It is to the point now that the patient states she can not put weight on the foot.        BP (!) 89/64   Pulse 75   Temp 37.1 °C (98.7 °F) (Temporal)   Resp 18   Ht 1.499 m (4' 11\")   Wt 51.2 kg (112 lb 14 oz)   LMP 07/24/2022 (Exact Date)   SpO2 97%   BMI 22.80 kg/m²     "

## 2022-07-24 NOTE — DISCHARGE INSTRUCTIONS
I recommend that she stay off it is much as possible, wear the boot for comfort.  You can put weight on it as tolerated meaning that if there is no problem or no significant pain you can walk on it.  Ice it every day for 30 minutes after work.    There is always a possibility of small hairline fracture or other trauma so further testing may be needed.  I have included the orthopedic doctor for follow-up.  I recommend calling them on Monday getting seen Wednesday or Thursday for follow-up and obviously canceling the appointment if you feel better.

## 2022-07-24 NOTE — ED PROVIDER NOTES
"ED Provider Note    CHIEF COMPLAINT   Chief Complaint   Patient presents with   • Foot Pain     Patient states she felt pop 2 days ago at 7 pm while in the shower. Patient has progressively gotten worse. It is to the point now that the patient states she can not put weight on the foot.        HPI   Vanessa Ballesteros is a 31 y.o. female is a single mom who has a full-time job working at a warehouse, who was put on light duty yesterday because of foot pain comes in here with left foot pain.  Duration since Friday.  Worse on Saturday.  She describes it as sharp pain.  Over the midfoot.  Slightly radiating to the distal tibia.  Worse when she puts weight on it.  And when she dorsiflexes it.  Better when she keeps it elevated.  No associated numbness or tingling of the toes no associated discoloration.    Patient was getting out of the shower.  She stepped over the tub she heard a pop in her foot.  She felt that it is \"like you are cracking her neck\".  She felt some slight discomfort went to bed as it was Friday afternoon woke up Saturday in pain.  She works in a industrial area.  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  She is managing with ibuprofen at home.  Still with discomfort.  No other trauma no associated fever or chills no discoloration of her toes.  No numbness or tingling.    REVIEW OF SYSTEMS   See above    PAST MEDICAL HISTORY   Past Medical History:   Diagnosis Date   • Ileitis, terminal (HCC)    • Microcytic anemia 2/14/2016       SOCIAL HISTORY  Social History     Socioeconomic History   • Marital status: Single   Tobacco Use   • Smoking status: Former Smoker     Packs/day: 0.25     Years: 2.00     Pack years: 0.50     Types: Cigarettes   • Smokeless tobacco: Never Used   • Tobacco comment: quit 12/12   Vaping Use   • Vaping Use: Never used   Substance and Sexual Activity   • Alcohol use: No   • Drug use: No " "  • Sexual activity: Yes     Partners: Male     Comment: none        SURGICAL HISTORY  Past Surgical History:   Procedure Laterality Date   • MARCELINO BY LAPAROSCOPY  12/20/2016    Procedure: MARCELINO BY LAPAROSCOPY;  Surgeon: Cem Pedro M.D.;  Location: SURGERY [facility];  Service:    • OTHER ABDOMINAL SURGERY         CURRENT MEDICATIONS   Home Medications     Reviewed by Kane Price R.N. (Registered Nurse) on 07/24/22 at 0615  Med List Status: Not Addressed   Medication Last Dose Status   azithromycin (ZITHROMAX) 500 MG tablet  Active   cefdinir (OMNICEF) 300 MG Cap  Active   mag hydrox-al hydrox-simeth-diphenhydrAMINE-lidocaine  Active   naproxen (NAPROSYN) 500 MG Tab  Active   sore throat spray (CHLORASEPTIC) 1.4 % Liquid  Active                ALLERGIES   No Known Allergies    PHYSICAL EXAM  VITAL SIGNS: BP (!) 96/60   Pulse 79   Temp 37.1 °C (98.7 °F) (Temporal)   Resp 18   Ht 1.499 m (4' 11\")   Wt 51.2 kg (112 lb 14 oz)   LMP 07/24/2022 (Exact Date)   SpO2 96%   BMI 22.80 kg/m²    Constitutional: Well developed, Well nourished, No acute distress, Non-toxic appearance.   Cardiovascular: Good pulses on the affected extremity. Good capillary refill.  Thorax & Lungs: No respiratory distress  Skin: No discoloration of her toes no lacerations abrasions on any side of her foot.  There is no erythema no streaking.  No fluctuance no masses.  Musculoskeletal: No tenderness over the medial lateral malleolus.  Over the distal tibia.  She has tenderness over the proximal foot.  There is no significant point tenderness.  There is no pain over the base of the fifth metatarsal.  There is no tenderness over the toes.  Neurologic: Good sensation to light touch on the distal affected extremity.    RADIOLOGY/PROCEDURES  DX-FOOT-COMPLETE 3+ LEFT   Final Result      No acute fracture identified.            COURSE & MEDICAL DECISION MAKING  Pertinent Labs & Imaging studies reviewed. (See chart for details)  Very " pleasant 31-year-old female who works and is on her feet.  The patient appears to have had some pain stepping out of the shower.     8:05 AM  X-rays negative.  Informed patient of the results.  We will get her a boot and crutches.  I have asked her to put the boot on and wear the boot for work.  Weight as tolerated.  Keep it elevated is much as possible ice is much as possible Motrin for pain.  Will refer to orthopedics for further testing such as CT or MRI if pain continues but I said most likely this is a sprain and should resolve over the next week.  We have told her that we will write a work note for work that she needs to be off the foot is much as possible.    Is a very pleasant 31-year-old female 2 days of foot pain questionable trauma while stepping out of the shower x-ray showed no sec fracture.  More further testing is needed if she continues having discomfort otherwise wait bearing as tolerated    FINAL IMPRESSION  1.  Left foot pain.  2.   3.      Electronically signed by: Anson Narvaez M.D., 7/24/2022 6:31 AM

## 2022-07-24 NOTE — ED NOTES
PT presents with foot in the left foot x2 days.  Was stepping out of the shower and felt a pop with pain.

## 2022-07-24 NOTE — Clinical Note
Vanessa Ballesteros was seen and treated in our emergency department on 7/24/2022.  She may return to work on 07/25/2022.  Needs to wear the boot and may need crutches until cleared by the orthopedic surgeon.     If you have any questions or concerns, please don't hesitate to call.      Anson Narvaez M.D.

## 2022-07-24 NOTE — ED NOTES
Report received from Kane RN,    Pt currently resetting on gurenrique, no distress    Waiting for x ray

## 2023-01-19 PROCEDURE — 99284 EMERGENCY DEPT VISIT MOD MDM: CPT

## 2023-01-19 ASSESSMENT — FIBROSIS 4 INDEX: FIB4 SCORE: 0.26

## 2023-01-20 ENCOUNTER — APPOINTMENT (OUTPATIENT)
Dept: RADIOLOGY | Facility: MEDICAL CENTER | Age: 33
End: 2023-01-20
Attending: STUDENT IN AN ORGANIZED HEALTH CARE EDUCATION/TRAINING PROGRAM
Payer: MEDICAID

## 2023-01-20 ENCOUNTER — HOSPITAL ENCOUNTER (EMERGENCY)
Facility: MEDICAL CENTER | Age: 33
End: 2023-01-20
Attending: STUDENT IN AN ORGANIZED HEALTH CARE EDUCATION/TRAINING PROGRAM
Payer: MEDICAID

## 2023-01-20 VITALS
RESPIRATION RATE: 18 BRPM | BODY MASS INDEX: 23.69 KG/M2 | SYSTOLIC BLOOD PRESSURE: 110 MMHG | WEIGHT: 117.5 LBS | OXYGEN SATURATION: 100 % | HEIGHT: 59 IN | TEMPERATURE: 98.1 F | DIASTOLIC BLOOD PRESSURE: 79 MMHG | HEART RATE: 69 BPM

## 2023-01-20 DIAGNOSIS — D70.9 NEUTROPENIA, UNSPECIFIED TYPE (HCC): ICD-10-CM

## 2023-01-20 DIAGNOSIS — R10.2 PELVIC PAIN: ICD-10-CM

## 2023-01-20 LAB
ALBUMIN SERPL BCP-MCNC: 4.9 G/DL (ref 3.2–4.9)
ALBUMIN/GLOB SERPL: 1.4 G/DL
ALP SERPL-CCNC: 65 U/L (ref 30–99)
ALT SERPL-CCNC: 9 U/L (ref 2–50)
ANION GAP SERPL CALC-SCNC: 11 MMOL/L (ref 7–16)
APPEARANCE UR: CLEAR
AST SERPL-CCNC: 13 U/L (ref 12–45)
BASOPHILS # BLD AUTO: 1 % (ref 0–1.8)
BASOPHILS # BLD: 0.03 K/UL (ref 0–0.12)
BILIRUB SERPL-MCNC: 0.2 MG/DL (ref 0.1–1.5)
BILIRUB UR QL STRIP.AUTO: NEGATIVE
BUN SERPL-MCNC: 12 MG/DL (ref 8–22)
C TRACH DNA SPEC QL NAA+PROBE: NEGATIVE
CALCIUM ALBUM COR SERPL-MCNC: 8.8 MG/DL (ref 8.5–10.5)
CALCIUM SERPL-MCNC: 9.5 MG/DL (ref 8.4–10.2)
CHLORIDE SERPL-SCNC: 102 MMOL/L (ref 96–112)
CO2 SERPL-SCNC: 25 MMOL/L (ref 20–33)
COLOR UR: YELLOW
COMMENT 1642: NORMAL
CREAT SERPL-MCNC: 0.45 MG/DL (ref 0.5–1.4)
EOSINOPHIL # BLD AUTO: 0.2 K/UL (ref 0–0.51)
EOSINOPHIL NFR BLD: 6.6 % (ref 0–6.9)
ERYTHROCYTE [DISTWIDTH] IN BLOOD BY AUTOMATED COUNT: 43.6 FL (ref 35.9–50)
GFR SERPLBLD CREATININE-BSD FMLA CKD-EPI: 132 ML/MIN/1.73 M 2
GLOBULIN SER CALC-MCNC: 3.5 G/DL (ref 1.9–3.5)
GLUCOSE SERPL-MCNC: 94 MG/DL (ref 65–99)
GLUCOSE UR STRIP.AUTO-MCNC: NEGATIVE MG/DL
HCG UR QL: NEGATIVE
HCT VFR BLD AUTO: 34.8 % (ref 37–47)
HGB BLD-MCNC: 10.7 G/DL (ref 12–16)
IMM GRANULOCYTES # BLD AUTO: 0 K/UL (ref 0–0.11)
IMM GRANULOCYTES NFR BLD AUTO: 0 % (ref 0–0.9)
KETONES UR STRIP.AUTO-MCNC: NEGATIVE MG/DL
LEUKOCYTE ESTERASE UR QL STRIP.AUTO: NEGATIVE
LYMPHOCYTES # BLD AUTO: 1.77 K/UL (ref 1–4.8)
LYMPHOCYTES NFR BLD: 58.2 % (ref 22–41)
MCH RBC QN AUTO: 23.1 PG (ref 27–33)
MCHC RBC AUTO-ENTMCNC: 30.7 G/DL (ref 33.6–35)
MCV RBC AUTO: 75.2 FL (ref 81.4–97.8)
MICRO URNS: NORMAL
MONOCYTES # BLD AUTO: 0.61 K/UL (ref 0–0.85)
MONOCYTES NFR BLD AUTO: 20.1 % (ref 0–13.4)
N GONORRHOEA DNA SPEC QL NAA+PROBE: NEGATIVE
NEUTROPHILS # BLD AUTO: 0.43 K/UL (ref 2–7.15)
NEUTROPHILS NFR BLD: 14.1 % (ref 44–72)
NITRITE UR QL STRIP.AUTO: NEGATIVE
NRBC # BLD AUTO: 0 K/UL
NRBC BLD-RTO: 0 /100 WBC
PH UR STRIP.AUTO: 7 [PH] (ref 5–8)
PLATELET # BLD AUTO: 429 K/UL (ref 164–446)
PMV BLD AUTO: 9.1 FL (ref 9–12.9)
POTASSIUM SERPL-SCNC: 3.7 MMOL/L (ref 3.6–5.5)
PROT SERPL-MCNC: 8.4 G/DL (ref 6–8.2)
PROT UR QL STRIP: NEGATIVE MG/DL
RBC # BLD AUTO: 4.63 M/UL (ref 4.2–5.4)
RBC UR QL AUTO: NEGATIVE
SODIUM SERPL-SCNC: 138 MMOL/L (ref 135–145)
SP GR UR STRIP.AUTO: <=1.005
SPECIMEN SOURCE: NORMAL
WBC # BLD AUTO: 3 K/UL (ref 4.8–10.8)

## 2023-01-20 PROCEDURE — 87491 CHLMYD TRACH DNA AMP PROBE: CPT

## 2023-01-20 PROCEDURE — 76856 US EXAM PELVIC COMPLETE: CPT

## 2023-01-20 PROCEDURE — 85025 COMPLETE CBC W/AUTO DIFF WBC: CPT

## 2023-01-20 PROCEDURE — 81003 URINALYSIS AUTO W/O SCOPE: CPT

## 2023-01-20 PROCEDURE — 81025 URINE PREGNANCY TEST: CPT

## 2023-01-20 PROCEDURE — 87591 N.GONORRHOEAE DNA AMP PROB: CPT

## 2023-01-20 PROCEDURE — 80053 COMPREHEN METABOLIC PANEL: CPT

## 2023-01-20 PROCEDURE — 36415 COLL VENOUS BLD VENIPUNCTURE: CPT

## 2023-01-20 ASSESSMENT — PAIN DESCRIPTION - PAIN TYPE: TYPE: ACUTE PAIN

## 2023-01-20 NOTE — DISCHARGE INSTRUCTIONS
These call your gynecologist to arrange follow-up as soon as possible.  You will need to have follow-up from your ultrasound today as discussed.  Please return if you have worsening pain, vomiting, and inability to eat or drink or you have any new or acute concern.    For pain, take Ibuprofen (Motrin, Advil) 600 mg up to every six hours  mg up to every eight hours if your stomach can take it. Instead of Ibuprofen, you can choose Naproxen (Aleve) and take 250-500 mg twice daily. Take Ibuprofen or Naproxen with food to lessen stomach irritation. You may also take Acetaminophen (Tylenol) 500mg (may take up to 1gm) every six hours in addition to Ibuprofen or Naproxen. (Maximum Tylenol 4 gm/day).  I suggest alternating between the ibuprofen and Tylenol every 4 hours for optimal pain relief and adequate spacing of each medication.

## 2023-01-20 NOTE — ED NOTES
Wanda from lab called with critical result of absolute neutrophil of 0.43 at 0107. Critical lab result read back to Wanda.  Dr. Green notified of critical lab result at 0132.  Critical lab result read back by Dr. Green.

## 2023-01-20 NOTE — ED PROVIDER NOTES
ED Provider Note    CHIEF COMPLAINT  No chief complaint on file.      EXTERNAL RECORDS REVIEWED  Other I reviewed ER notes from September 2022 at [facility].  At that time she was evaluated for dysuria and flank pain.  She was treated for UTI and discharged home.    HPI/ROS  LIMITATION TO HISTORY   Select: : None  OUTSIDE HISTORIAN(S):  Significant other with her at bedside    Vanessa Ballesteros is a 32 y.o. female who presents with constant, sharp stabbing pressure pain in pelvic area that radiates bilaterally through her abdomen and into her back.  It started about a week ago but worsened today while she was at work.  She has been doing lots of lifting at work and the pain is worsened with movement.  She describes it as 8 out of 10 in severity.  She is concerned that this might be related to her ParaGard.  She says that this was placed 1 year ago.  She does state previously the ParaGard became dislodged and she had to have it removed.  She does currently have vaginal bleeding which she describes as light spotting, starting about 3 to 4 days ago.  She feels nauseous but has not had any vomiting, fevers or chills.  She has no diarrhea.  She also endorses pain with intercourse, that also started about a week ago.  She has no dysuria, hematuria or urgency.  She does have some vaginal discharge but no changes or odor.  Unknown if pregnant currently. Denies concern for STD    PAST MEDICAL HISTORY   has a past medical history of Ileitis, terminal (HCC) and Microcytic anemia (2/14/2016).    SURGICAL HISTORY   has a past surgical history that includes jose a by laparoscopy (12/20/2016) and other abdominal surgery.    FAMILY HISTORY  Family History   Problem Relation Age of Onset    Heart Attack Mother     Stroke Mother     Hypertension Maternal Grandmother     Diabetes Maternal Grandfather     Cancer Paternal Grandmother         lung cancer        SOCIAL HISTORY  Social History     Tobacco Use    Smoking status: Former      "Packs/day: 0.25     Years: 2.00     Pack years: 0.50     Types: Cigarettes    Smokeless tobacco: Never    Tobacco comments:     quit 12/12   Vaping Use    Vaping Use: Never used   Substance and Sexual Activity    Alcohol use: No    Drug use: No    Sexual activity: Yes     Partners: Male     Comment: none        CURRENT MEDICATIONS  Home Medications    **Home medications have not yet been reviewed for this encounter**         ALLERGIES  No Known Allergies    PHYSICAL EXAM  VITAL SIGNS: /79   Pulse 69   Temp 36.7 °C (98.1 °F) (Temporal)   Resp 18   Ht 1.499 m (4' 11\")   Wt 53.3 kg (117 lb 8.1 oz)   SpO2 100%   BMI 23.73 kg/m²    Constitutional: Awake and alert. Nontoxic  HENT:  Grossly normal  Eyes: Grossly normal  Neck: Normal range of motion  Cardiovascular: Normal heart rate   Thorax & Lungs: No respiratory distress  Abdomen: Non tender to palpation in all 4 quadrants. No rebound or guarding. Mild suprapubic tenderness  Pelvic  Speculum exam with normal appearing whitish vaginal discharge.  Vaginal wall mucosa is unremarkable.  Cervix visualized and is unremarkable (closed in appearance without any protruding material). I am able to visualize the IUD strings   Bimanual exam without cervical motion tenderness, adnexal tenderness or any masses appreciated.  Skin:  No pathologic rash.   Extremities: Well perfused  Psychiatric: Affect normal      DIAGNOSTIC STUDIES / PROCEDURES    LABS  Results for orders placed or performed during the hospital encounter of 01/20/23   URINALYSIS    Specimen: Urine   Result Value Ref Range    Color Yellow     Character Clear     Specific Gravity <=1.005 <1.035    Ph 7.0 5.0 - 8.0    Glucose Negative Negative mg/dL    Ketones Negative Negative mg/dL    Protein Negative Negative mg/dL    Bilirubin Negative Negative    Nitrite Negative Negative    Leukocyte Esterase Negative Negative    Occult Blood Negative Negative    Micro Urine Req see below    BETA-HCG QUALITATIVE URINE "   Result Value Ref Range    Beta-Hcg Urine Negative Negative   Chlamydia/GC, PCR (Urine)    Specimen: Urine   Result Value Ref Range    Source Urine    CBC WITH DIFFERENTIAL   Result Value Ref Range    WBC 3.0 (L) 4.8 - 10.8 K/uL    RBC 4.63 4.20 - 5.40 M/uL    Hemoglobin 10.7 (L) 12.0 - 16.0 g/dL    Hematocrit 34.8 (L) 37.0 - 47.0 %    MCV 75.2 (L) 81.4 - 97.8 fL    MCH 23.1 (L) 27.0 - 33.0 pg    MCHC 30.7 (L) 33.6 - 35.0 g/dL    RDW 43.6 35.9 - 50.0 fL    Platelet Count 429 164 - 446 K/uL    MPV 9.1 9.0 - 12.9 fL    Neutrophils-Polys 14.10 (L) 44.00 - 72.00 %    Lymphocytes 58.20 (H) 22.00 - 41.00 %    Monocytes 20.10 (H) 0.00 - 13.40 %    Eosinophils 6.60 0.00 - 6.90 %    Basophils 1.00 0.00 - 1.80 %    Immature Granulocytes 0.00 0.00 - 0.90 %    Nucleated RBC 0.00 /100 WBC    Neutrophils (Absolute) 0.43 (LL) 2.00 - 7.15 K/uL    Lymphs (Absolute) 1.77 1.00 - 4.80 K/uL    Monos (Absolute) 0.61 0.00 - 0.85 K/uL    Eos (Absolute) 0.20 0.00 - 0.51 K/uL    Baso (Absolute) 0.03 0.00 - 0.12 K/uL    Immature Granulocytes (abs) 0.00 0.00 - 0.11 K/uL    NRBC (Absolute) 0.00 K/uL   CMP   Result Value Ref Range    Sodium 138 135 - 145 mmol/L    Potassium 3.7 3.6 - 5.5 mmol/L    Chloride 102 96 - 112 mmol/L    Co2 25 20 - 33 mmol/L    Anion Gap 11.0 7.0 - 16.0    Glucose 94 65 - 99 mg/dL    Bun 12 8 - 22 mg/dL    Creatinine 0.45 (L) 0.50 - 1.40 mg/dL    Calcium 9.5 8.4 - 10.2 mg/dL    AST(SGOT) 13 12 - 45 U/L    ALT(SGPT) 9 2 - 50 U/L    Alkaline Phosphatase 65 30 - 99 U/L    Total Bilirubin 0.2 0.1 - 1.5 mg/dL    Albumin 4.9 3.2 - 4.9 g/dL    Total Protein 8.4 (H) 6.0 - 8.2 g/dL    Globulin 3.5 1.9 - 3.5 g/dL    A-G Ratio 1.4 g/dL   DIFFERENTIAL COMMENT   Result Value Ref Range    Comments-Diff see below    CORRECTED CALCIUM   Result Value Ref Range    Correct Calcium 8.8 8.5 - 10.5 mg/dL   ESTIMATED GFR   Result Value Ref Range    GFR (CKD-EPI) 132 >60 mL/min/1.73 m 2         RADIOLOGY  I have independently interpreted the  diagnostic imaging associated with this visit and am waiting the final reading from the radiologist.   My preliminary interpretation is a follows: US with normal flow to ovaries    COURSE & MEDICAL DECISION MAKING    ED Observation Status? No; Patient does not meet criteria for ED Observation.     INITIAL ASSESSMENT AND PLAN  Care Narrative: This is a 32-year-old female who presents with lower pelvic pain.  She arrives with normal vital signs and appears overall well.  She is not dehydrated or septic appearing.  She has a benign abdominal exam without signs of peritonitis.  On pelvic exam I am able to visualize the ParaGard strings and the IUD itself is not grossly dislodged.  Labs are notable for neutropenia which she says she has a known history of.  She has had an extensive prior work-up for this previously.  Her labs are otherwise reassuring.  Urine without any signs of infection.  Pregnancy test is negative.  She does not have any cervical motion tenderness to suggest pelvic inflammatory disease.  I did obtain a pelvic ultrasound which does not show any evidence of ovarian torsion.  She did have a thickened endometrial stripe.  I informed her of this finding and stressed the importance of having this followed up on.  I considered but doubt appendicitis, small bowel obstruction, intra-abdominal infection or other acute surgical emergency based on her reassuring exam and do not think further cross-sectional imaging is indicated.  Patient was offered pain medicine and antiemetics but declined stating that she had these at home.  She has a gynecologist who she will call to arrange close follow-up with.  She understands return precautions to ER and all her questions were answered. She was discharged home in good condition.    ADDITIONAL PROBLEM LIST AND DISPOSITION    Problem #1: Pelvic Pain    I have discussed management of the patient with the following physicians and DREW's:  None    Discussion of management with  other QHP or appropriate source(s): None       HTN/IDDM FOLLOW UP:  The patient is referred to a primary physician for blood pressure management, diabetic screening, and for all other preventive health concerns        FINAL DIAGNOSIS  1. Neutropenia, unspecified type (HCC) Chronic   2. Pelvic pain Acute              Electronically signed by: Juliet Green M.D., 1/20/2023 12:26 AM

## 2023-01-20 NOTE — ED NOTES
Pt ambulates to room 7a with fiance present, c/o concern for placement of Paraguard IUD placed about 1 year ago.  Sx of pain with intercourse, constant sharp, stabbing, pressure and radiates to her r/l side and through lower back for 1 week.  +vaginal bleeding described as spotting about 3-4 days ago, light pink color.   +nausea -fevers, -vomiting, -diarrhea  Hx of body rejection of previous IUD

## 2023-01-20 NOTE — ED TRIAGE NOTES
"Pt ambulates to triage with complaint of 8/10 pain near her cervix for the past several days. Pt states \"I am worried my birth control is out of place\" She also states the pain radiates to her back and central abdomen.   "

## 2023-02-20 ENCOUNTER — HOSPITAL ENCOUNTER (EMERGENCY)
Facility: MEDICAL CENTER | Age: 33
End: 2023-02-20
Attending: EMERGENCY MEDICINE
Payer: MEDICAID

## 2023-02-20 ENCOUNTER — APPOINTMENT (OUTPATIENT)
Dept: RADIOLOGY | Facility: MEDICAL CENTER | Age: 33
End: 2023-02-20
Attending: EMERGENCY MEDICINE
Payer: MEDICAID

## 2023-02-20 VITALS
HEIGHT: 59 IN | WEIGHT: 115 LBS | DIASTOLIC BLOOD PRESSURE: 68 MMHG | HEART RATE: 64 BPM | OXYGEN SATURATION: 98 % | RESPIRATION RATE: 16 BRPM | TEMPERATURE: 97.6 F | BODY MASS INDEX: 23.18 KG/M2 | SYSTOLIC BLOOD PRESSURE: 104 MMHG

## 2023-02-20 DIAGNOSIS — R07.89 CHEST WALL PAIN: Primary | ICD-10-CM

## 2023-02-20 DIAGNOSIS — D64.9 ANEMIA, UNSPECIFIED TYPE: ICD-10-CM

## 2023-02-20 DIAGNOSIS — D70.8 OTHER NEUTROPENIA (HCC): ICD-10-CM

## 2023-02-20 LAB
ALBUMIN SERPL BCP-MCNC: 4.4 G/DL (ref 3.2–4.9)
ALBUMIN/GLOB SERPL: 1.2 G/DL
ALP SERPL-CCNC: 80 U/L (ref 30–99)
ALT SERPL-CCNC: 13 U/L (ref 2–50)
ANION GAP SERPL CALC-SCNC: 11 MMOL/L (ref 7–16)
AST SERPL-CCNC: 14 U/L (ref 12–45)
BASOPHILS # BLD AUTO: 0 % (ref 0–1.8)
BASOPHILS # BLD: 0 K/UL (ref 0–0.12)
BILIRUB SERPL-MCNC: <0.2 MG/DL (ref 0.1–1.5)
BUN SERPL-MCNC: 15 MG/DL (ref 8–22)
CALCIUM ALBUM COR SERPL-MCNC: 8.9 MG/DL (ref 8.5–10.5)
CALCIUM SERPL-MCNC: 9.2 MG/DL (ref 8.5–10.5)
CHLORIDE SERPL-SCNC: 105 MMOL/L (ref 96–112)
CO2 SERPL-SCNC: 24 MMOL/L (ref 20–33)
CREAT SERPL-MCNC: 0.46 MG/DL (ref 0.5–1.4)
EKG IMPRESSION: NORMAL
EOSINOPHIL # BLD AUTO: 0.19 K/UL (ref 0–0.51)
EOSINOPHIL NFR BLD: 5.2 % (ref 0–6.9)
ERYTHROCYTE [DISTWIDTH] IN BLOOD BY AUTOMATED COUNT: 42.7 FL (ref 35.9–50)
GFR SERPLBLD CREATININE-BSD FMLA CKD-EPI: 131 ML/MIN/1.73 M 2
GLOBULIN SER CALC-MCNC: 3.6 G/DL (ref 1.9–3.5)
GLUCOSE SERPL-MCNC: 91 MG/DL (ref 65–99)
HCG SERPL QL: NEGATIVE
HCT VFR BLD AUTO: 31.8 % (ref 37–47)
HGB BLD-MCNC: 9.6 G/DL (ref 12–16)
LIPASE SERPL-CCNC: 31 U/L (ref 11–82)
LYMPHOCYTES # BLD AUTO: 2.07 K/UL (ref 1–4.8)
LYMPHOCYTES NFR BLD: 57.4 % (ref 22–41)
MANUAL DIFF BLD: NORMAL
MCH RBC QN AUTO: 22.7 PG (ref 27–33)
MCHC RBC AUTO-ENTMCNC: 30.2 G/DL (ref 33.6–35)
MCV RBC AUTO: 75.2 FL (ref 81.4–97.8)
MONOCYTES # BLD AUTO: 0.75 K/UL (ref 0–0.85)
MONOCYTES NFR BLD AUTO: 20.9 % (ref 0–13.4)
MORPHOLOGY BLD-IMP: NORMAL
NEUTROPHILS # BLD AUTO: 0.59 K/UL (ref 2–7.15)
NEUTROPHILS NFR BLD: 16.5 % (ref 44–72)
NRBC # BLD AUTO: 0 K/UL
NRBC BLD-RTO: 0 /100 WBC
OVALOCYTES BLD QL SMEAR: NORMAL
PLATELET # BLD AUTO: 419 K/UL (ref 164–446)
PLATELET BLD QL SMEAR: NORMAL
PMV BLD AUTO: 8.7 FL (ref 9–12.9)
POIKILOCYTOSIS BLD QL SMEAR: NORMAL
POTASSIUM SERPL-SCNC: 3.7 MMOL/L (ref 3.6–5.5)
PROT SERPL-MCNC: 8 G/DL (ref 6–8.2)
RBC # BLD AUTO: 4.23 M/UL (ref 4.2–5.4)
RBC BLD AUTO: PRESENT
SODIUM SERPL-SCNC: 140 MMOL/L (ref 135–145)
TROPONIN T SERPL-MCNC: <6 NG/L (ref 6–19)
WBC # BLD AUTO: 3.6 K/UL (ref 4.8–10.8)

## 2023-02-20 PROCEDURE — 85007 BL SMEAR W/DIFF WBC COUNT: CPT

## 2023-02-20 PROCEDURE — 700111 HCHG RX REV CODE 636 W/ 250 OVERRIDE (IP): Performed by: EMERGENCY MEDICINE

## 2023-02-20 PROCEDURE — 36415 COLL VENOUS BLD VENIPUNCTURE: CPT

## 2023-02-20 PROCEDURE — 85025 COMPLETE CBC W/AUTO DIFF WBC: CPT

## 2023-02-20 PROCEDURE — 84703 CHORIONIC GONADOTROPIN ASSAY: CPT

## 2023-02-20 PROCEDURE — 71045 X-RAY EXAM CHEST 1 VIEW: CPT

## 2023-02-20 PROCEDURE — 84484 ASSAY OF TROPONIN QUANT: CPT

## 2023-02-20 PROCEDURE — 96374 THER/PROPH/DIAG INJ IV PUSH: CPT

## 2023-02-20 PROCEDURE — 80053 COMPREHEN METABOLIC PANEL: CPT

## 2023-02-20 PROCEDURE — 93005 ELECTROCARDIOGRAM TRACING: CPT | Performed by: EMERGENCY MEDICINE

## 2023-02-20 PROCEDURE — 83690 ASSAY OF LIPASE: CPT

## 2023-02-20 PROCEDURE — 99284 EMERGENCY DEPT VISIT MOD MDM: CPT

## 2023-02-20 PROCEDURE — 93005 ELECTROCARDIOGRAM TRACING: CPT

## 2023-02-20 RX ORDER — KETOROLAC TROMETHAMINE 30 MG/ML
15 INJECTION, SOLUTION INTRAMUSCULAR; INTRAVENOUS ONCE
Status: COMPLETED | OUTPATIENT
Start: 2023-02-20 | End: 2023-02-20

## 2023-02-20 RX ORDER — ACETAMINOPHEN 500 MG
1000 TABLET ORAL EVERY 6 HOURS PRN
Qty: 20 TABLET | Refills: 0 | Status: SHIPPED | OUTPATIENT
Start: 2023-02-20 | End: 2023-02-24

## 2023-02-20 RX ORDER — NAPROXEN 500 MG/1
500 TABLET ORAL 2 TIMES DAILY WITH MEALS
Qty: 6 TABLET | Refills: 0 | Status: SHIPPED | OUTPATIENT
Start: 2023-02-20 | End: 2023-02-23

## 2023-02-20 RX ADMIN — KETOROLAC TROMETHAMINE 15 MG: 30 INJECTION, SOLUTION INTRAMUSCULAR; INTRAVENOUS at 22:26

## 2023-02-20 ASSESSMENT — PAIN DESCRIPTION - PAIN TYPE: TYPE: ACUTE PAIN

## 2023-02-20 ASSESSMENT — FIBROSIS 4 INDEX: FIB4 SCORE: 0.31

## 2023-02-21 NOTE — ED TRIAGE NOTES
"Chief Complaint   Patient presents with    Abdominal Pain     Patient bib ems to triage for cp/sob/abd pain x3 days worse with eating. Patient denies n/v/d. States she has felt like this for the last few days but after eating tonight it got worse, sharp pain to ruq and chest. EKG completed per protocol       /70   Pulse 93   Temp 36.2 °C (97.1 °F) (Temporal)   Resp 16   Ht 1.499 m (4' 11\")   Wt 52.2 kg (115 lb)   SpO2 98%     Patient educated on ed triage process, instructed to notify staff of any new or worsening symptoms, abdominal pain protocol orders, patient placed back in ed waiting room, apologized for wait times.   "

## 2023-02-21 NOTE — ED NOTES
Pt given discharge instructions regarding follow up, prescriptions, nonspecific chest pain, and reasons to return to the ER. Pt verbalized understanding and signed AVS

## 2023-02-21 NOTE — DISCHARGE INSTRUCTIONS
Your cardiac work-up was negative today.  Your neutropenia and anemia are unchanged from prior.  I think this is likely coming from your chest wall.  Take Tylenol and naproxen which I sent prescriptions for you to the pharmacy.  Follow-up with your PCP for further evaluation.  If you have worsening symptoms, please return to the ED.  Thank you for coming in today.

## 2023-02-21 NOTE — ED PROVIDER NOTES
ED Provider Note    Scribed for Brendon Benjamin by Kevin Echols. 2/20/2023  10:05 PM    Primary care provider: KIARA Marques  Means of arrival: EMS - Ambulance  History obtained from: Patient  History limited by: None    CHIEF COMPLAINT  Chief Complaint   Patient presents with    Abdominal Pain     Patient bib ems to triage for cp/sob/abd pain x3 days worse with eating. Patient denies n/v/d. States she has felt like this for the last few days but after eating tonight it got worse, sharp pain to ruq and chest. EKG completed per protocol       EXTERNAL RECORDS REVIEWED  Other Frequently seen in this ED. Last seen Jan 20th for pelvic pain. History of neutropenia, has had extensive workup previously for this.    External ED: Seen Sept 22nd for UTI and flank pain.    HPI/ROS  LIMITATION TO HISTORY   Select: : None  OUTSIDE HISTORIAN(S):  None    HPI  Vanessa Ballesteros is a 32 y.o. female who presents to the Emergency Department for evaluation of ongoing right sided abdominal pain which onset 3 days ago. She describes the pain as pinching and it caused her to have difficulty breathing. She notes this started with chest pain on the right side, which has moved from the right to the left, and has caused this associated abdominal pain. She states she has a history of neutropenia, and a surgical history of gallbladder removal. She denies any nausea, vomiting, diarrhea, vaginal bleeding or discharge, or painful urination. She denies any tobacco use, drug use or alcohol use. She also states she doesn't take any medications on a regular basis.    REVIEW OF SYSTEMS  As above, all other systems reviewed and are negative.   See HPI for further details.     PAST MEDICAL HISTORY   has a past medical history of Ileitis, terminal (HCC) and Microcytic anemia (2/14/2016).  SURGICAL HISTORY   has a past surgical history that includes jose a by laparoscopy (12/20/2016) and other abdominal surgery.  SOCIAL HISTORY  Social History  "    Tobacco Use    Smoking status: Former     Packs/day: 0.25     Years: 2.00     Pack years: 0.50     Types: Cigarettes    Smokeless tobacco: Never    Tobacco comments:     quit 12/12   Vaping Use    Vaping Use: Never used   Substance Use Topics    Alcohol use: No    Drug use: No      Social History     Substance and Sexual Activity   Drug Use No     FAMILY HISTORY  Family History   Problem Relation Age of Onset    Heart Attack Mother     Stroke Mother     Hypertension Maternal Grandmother     Diabetes Maternal Grandfather     Cancer Paternal Grandmother         lung cancer      CURRENT MEDICATIONS  Home Medications       Reviewed by Adore Flood R.N. (Registered Nurse) on 02/20/23 at 2102  Med List Status: Not Addressed     Medication Last Dose Status   azithromycin (ZITHROMAX) 500 MG tablet  Active   cefdinir (OMNICEF) 300 MG Cap  Active   mag hydrox-al hydrox-simeth-diphenhydrAMINE-lidocaine  Active   naproxen (NAPROSYN) 500 MG Tab  Active   sore throat spray (CHLORASEPTIC) 1.4 % Liquid  Active                  ALLERGIES  No Known Allergies    PHYSICAL EXAM    VITAL SIGNS:   Vitals:    02/20/23 2054 02/20/23 2100   BP: 101/70    Pulse: 93    Resp: 16    Temp: 36.2 °C (97.1 °F)    TempSrc: Temporal    SpO2: 98%    Weight:  52.2 kg (115 lb)   Height:  1.499 m (4' 11\")     Vitals: My interpretation: normotensive, not tachycardic, afebrile, not hypoxic    Reinterpretation of vitals: Unchanged.    PE:   Constitutional: Well developed, Well nourished, No acute distress, Non-toxic appearance.   HENT: Normocephalic, Atraumatic, Bilateral external ears normal, Oropharynx is clear mucous membranes are moist. No oral exudates or nasal discharge.   Eyes: Pupils are equal round and reactive, EOMI, Conjunctiva normal, No discharge.   Neck: Normal range of motion, No tenderness, Supple, No stridor. No meningismus.  Lymphatic: No lymphadenopathy noted.   Cardiovascular: Regular rate and rhythm without murmur rub or " gallop.  Thorax & Lungs: Clear breath sounds bilaterally without wheezes, rhonchi or rales. Reproducible chest wall tenderness to right chest wall, no obvious deformity.  Abdomen: Soft non-tender non-distended. There is no rebound or guarding. No organomegaly is appreciated. Bowel sounds are normal.  Skin: Normal without rash.   Back: No CVA or spinal tenderness.   Extremities: Intact distal pulses, No edema, No tenderness, No cyanosis, No clubbing. Capillary refill is less than 2 seconds.  Musculoskeletal: Good range of motion in all major joints. No tenderness to palpation or major deformities noted.   Neurologic: Alert & oriented x 3, Normal motor function, Normal sensory function, No focal deficits noted. Reflexes are normal.  Psychiatric: Affect normal, Judgment normal, Mood normal. There is no suicidal ideation or patient reported hallucinations.     DIAGNOSTIC STUDIES / PROCEDURES    LABS  Results for orders placed or performed during the hospital encounter of 02/20/23   CBC WITH DIFFERENTIAL   Result Value Ref Range    WBC 3.6 (L) 4.8 - 10.8 K/uL    RBC 4.23 4.20 - 5.40 M/uL    Hemoglobin 9.6 (L) 12.0 - 16.0 g/dL    Hematocrit 31.8 (L) 37.0 - 47.0 %    MCV 75.2 (L) 81.4 - 97.8 fL    MCH 22.7 (L) 27.0 - 33.0 pg    MCHC 30.2 (L) 33.6 - 35.0 g/dL    RDW 42.7 35.9 - 50.0 fL    Platelet Count 419 164 - 446 K/uL    MPV 8.7 (L) 9.0 - 12.9 fL    Neutrophils-Polys 16.50 (L) 44.00 - 72.00 %    Lymphocytes 57.40 (H) 22.00 - 41.00 %    Monocytes 20.90 (H) 0.00 - 13.40 %    Eosinophils 5.20 0.00 - 6.90 %    Basophils 0.00 0.00 - 1.80 %    Nucleated RBC 0.00 /100 WBC    Neutrophils (Absolute) 0.59 (L) 2.00 - 7.15 K/uL    Lymphs (Absolute) 2.07 1.00 - 4.80 K/uL    Monos (Absolute) 0.75 0.00 - 0.85 K/uL    Eos (Absolute) 0.19 0.00 - 0.51 K/uL    Baso (Absolute) 0.00 0.00 - 0.12 K/uL    NRBC (Absolute) 0.00 K/uL   COMP METABOLIC PANEL   Result Value Ref Range    Sodium 140 135 - 145 mmol/L    Potassium 3.7 3.6 - 5.5 mmol/L     Chloride 105 96 - 112 mmol/L    Co2 24 20 - 33 mmol/L    Anion Gap 11.0 7.0 - 16.0    Glucose 91 65 - 99 mg/dL    Bun 15 8 - 22 mg/dL    Creatinine 0.46 (L) 0.50 - 1.40 mg/dL    Calcium 9.2 8.5 - 10.5 mg/dL    AST(SGOT) 14 12 - 45 U/L    ALT(SGPT) 13 2 - 50 U/L    Alkaline Phosphatase 80 30 - 99 U/L    Total Bilirubin <0.2 0.1 - 1.5 mg/dL    Albumin 4.4 3.2 - 4.9 g/dL    Total Protein 8.0 6.0 - 8.2 g/dL    Globulin 3.6 (H) 1.9 - 3.5 g/dL    A-G Ratio 1.2 g/dL   LIPASE   Result Value Ref Range    Lipase 31 11 - 82 U/L   HCG QUAL SERUM   Result Value Ref Range    Beta-Hcg Qualitative Serum Negative Negative   DIFFERENTIAL MANUAL   Result Value Ref Range    Manual Diff Status PERFORMED    PERIPHERAL SMEAR REVIEW   Result Value Ref Range    Peripheral Smear Review see below    PLATELET ESTIMATE   Result Value Ref Range    Plt Estimation Increased    MORPHOLOGY   Result Value Ref Range    RBC Morphology Present     Poikilocytosis 1+     Ovalocytes 1+    CORRECTED CALCIUM   Result Value Ref Range    Correct Calcium 8.9 8.5 - 10.5 mg/dL   ESTIMATED GFR   Result Value Ref Range    GFR (CKD-EPI) 131 >60 mL/min/1.73 m 2   TROPONIN   Result Value Ref Range    Troponin T <6 6 - 19 ng/L   EKG   Result Value Ref Range    Report       [facility] Emergency Dept.    Test Date:  2023  Pt Name:    FRAN ELAM                 Department: ER  MRN:        [id number]                      Room:  Gender:     Female                       Technician: [id number]  :        1990                   Requested By:ER TRIAGE PROTOCOL  Order #:    [id number]                    Yulisa MD: Brendon Benjamin    Measurements  Intervals                                Axis  Rate:       79                           P:          26  DC:         153                          QRS:        64  QRSD:       78                           T:          63  QT:         374  QTc:        429    Interpretive Statements  Sinus rhythm  RSR' in V1  or V2, probably normal variant  Compared to ECG 02/10/2022 15:26:32  RSR' in V1 or V2 now present  T-wave abnormality no longer present  Electronically Signed On 2- 22:06:19 PST by Brendon Benjamin        All labs reviewed by me. Labs were compared to prior labs if they were available. Significant for chronic neutropenia, chronic anemia, no thrombocytopenia, normal electrolytes, normal renal function, normal liver enzymes, normal bilirubin, lipase normal, pregnancy negative, troponin negative    RADIOLOGY  I have independently interpreted the diagnostic imaging associated with this visit and am waiting the final reading from the radiologist.   My preliminary interpretation is a follows: No acute focal consolidation or bony abnormality  Radiologist interpretation is as follows:  DX-CHEST-PORTABLE (1 VIEW)   Final Result      Borderline cardiomegaly without evidence of acute disease.        COURSE & MEDICAL DECISION MAKING  Nursing notes, VS, PMSFHx, labs, imaging, EKG reviewed in chart    ED Observation Status? No; Patient does not meet criteria for ED Observation.     Heart score: Low  PERC score: Negative    MDM: 10:05 PM Vanessa Ballesteros is a 32 y.o. female who presented with some anterior right-sided chest wall pain as well as questionable right upper quadrant abdominal pain.  Patient is a high-frequency utilizer of the emergency department here in the surrounding area.  She has chronic neutropenia and anemia which had significant extensive work-up and is stable, by chart review of external ED notes.  Symptoms started 3 days ago.  Upon arrival patient is very well-appearing, has normal vital signs and no acute distress.  Physical exam shows right-sided chest wall reproducible tenderness to palpation but no signs of trauma, injury, crepitus or deformity.  Chest x-ray unremarkable on my read for any signs of fractures or consolidation.  She underwent work-up for cardiac disease including EKG and troponin  which were negative, heart score low, PERC score negative.  The rest of her labs are at baseline with CBC, CMP and lipase.  At this time patient does not require second troponin as she has had chest pain for 3 days.  The fact that is reproducible on chest wall palpation is somewhat reassuring that this is likely more muscle skeletal rather than cardiac or lung related.  Treated here with Toradol in the emerged department with significant improvement.  Rx sent to the pharmacy for Tylenol and naproxen.  Patient will follow-up with her PCP for further evaluation treatment.  She verbalized understanding strict return precautions and outpatient follow-up plan and is amenable.    ADDITIONAL PROBLEM LIST AND DISPOSITION  No additional complaints    I have discussed management of the patient with the following physicians and DREW's:  None    Discussion of management with other QHP or appropriate source(s): None     Escalation of care considered, and ultimately not performed: None    Barriers to care at this time, including but not limited to:  None .     Decision tools and prescription drugs considered including, but not limited to:  Tylenol and Naproxen .    FINAL IMPRESSION  1. Chest wall pain Acute   2. Other neutropenia (HCC) Acute   3. Anemia, unspecified type Acute      Kevin FARIAS (Brennen), am scribing for, and in the presence of, Brendon Benjamin.    Electronically signed by: Kevin Echols (Brennen), 2/20/2023    IBrendon personally performed the services described in this documentation, as scribed by Kevin Echols in my presence, and it is both accurate and complete.    The note accurately reflects work and decisions made by me.  Brendon Benjamin  2/20/2023  10:48 PM

## 2023-05-11 NOTE — ED TRIAGE NOTES
Chief Complaint   Patient presents with   • Sore Throat     pt was covid + 3 weeks ago   • Fever   • Headache   • Cough        Yes

## 2024-07-01 ENCOUNTER — APPOINTMENT (OUTPATIENT)
Dept: RADIOLOGY | Facility: MEDICAL CENTER | Age: 34
End: 2024-07-01
Attending: EMERGENCY MEDICINE
Payer: MEDICAID

## 2024-07-01 ENCOUNTER — HOSPITAL ENCOUNTER (EMERGENCY)
Facility: MEDICAL CENTER | Age: 34
End: 2024-07-01
Attending: EMERGENCY MEDICINE
Payer: MEDICAID

## 2024-07-01 VITALS
OXYGEN SATURATION: 98 % | HEART RATE: 65 BPM | HEIGHT: 59 IN | RESPIRATION RATE: 12 BRPM | TEMPERATURE: 97.4 F | DIASTOLIC BLOOD PRESSURE: 67 MMHG | BODY MASS INDEX: 24.09 KG/M2 | WEIGHT: 119.49 LBS | SYSTOLIC BLOOD PRESSURE: 98 MMHG

## 2024-07-01 DIAGNOSIS — R10.32 BILATERAL LOWER ABDOMINAL CRAMPING: ICD-10-CM

## 2024-07-01 DIAGNOSIS — R10.31 BILATERAL LOWER ABDOMINAL CRAMPING: ICD-10-CM

## 2024-07-01 LAB
ALBUMIN SERPL BCP-MCNC: 4.3 G/DL (ref 3.2–4.9)
ALBUMIN/GLOB SERPL: 1.3 G/DL
ALP SERPL-CCNC: 56 U/L (ref 30–99)
ALT SERPL-CCNC: 9 U/L (ref 2–50)
ANION GAP SERPL CALC-SCNC: 11 MMOL/L (ref 7–16)
APPEARANCE UR: CLEAR
AST SERPL-CCNC: 12 U/L (ref 12–45)
BACTERIA GENITAL QL WET PREP: NORMAL
BASOPHILS # BLD AUTO: 0 % (ref 0–1.8)
BASOPHILS # BLD: 0 K/UL (ref 0–0.12)
BILIRUB SERPL-MCNC: 0.3 MG/DL (ref 0.1–1.5)
BILIRUB UR QL STRIP.AUTO: NEGATIVE
BUN SERPL-MCNC: 13 MG/DL (ref 8–22)
C TRACH DNA GENITAL QL NAA+PROBE: NEGATIVE
CALCIUM ALBUM COR SERPL-MCNC: 8.7 MG/DL (ref 8.5–10.5)
CALCIUM SERPL-MCNC: 8.9 MG/DL (ref 8.4–10.2)
CHLORIDE SERPL-SCNC: 104 MMOL/L (ref 96–112)
CO2 SERPL-SCNC: 23 MMOL/L (ref 20–33)
COLOR UR: YELLOW
CREAT SERPL-MCNC: 0.57 MG/DL (ref 0.5–1.4)
EOSINOPHIL # BLD AUTO: 0 K/UL (ref 0–0.51)
EOSINOPHIL NFR BLD: 0 % (ref 0–6.9)
ERYTHROCYTE [DISTWIDTH] IN BLOOD BY AUTOMATED COUNT: 45 FL (ref 35.9–50)
GFR SERPLBLD CREATININE-BSD FMLA CKD-EPI: 123 ML/MIN/1.73 M 2
GLOBULIN SER CALC-MCNC: 3.4 G/DL (ref 1.9–3.5)
GLUCOSE SERPL-MCNC: 91 MG/DL (ref 65–99)
GLUCOSE UR STRIP.AUTO-MCNC: NEGATIVE MG/DL
HCG SERPL QL: NEGATIVE
HCT VFR BLD AUTO: 34.1 % (ref 37–47)
HGB BLD-MCNC: 10.6 G/DL (ref 12–16)
KETONES UR STRIP.AUTO-MCNC: NEGATIVE MG/DL
LEUKOCYTE ESTERASE UR QL STRIP.AUTO: NEGATIVE
LYMPHOCYTES # BLD AUTO: 1.12 K/UL (ref 1–4.8)
LYMPHOCYTES NFR BLD: 31 % (ref 22–41)
MANUAL DIFF BLD: NORMAL
MCH RBC QN AUTO: 24.7 PG (ref 27–33)
MCHC RBC AUTO-ENTMCNC: 31.1 G/DL (ref 32.2–35.5)
MCV RBC AUTO: 79.3 FL (ref 81.4–97.8)
MICRO URNS: NORMAL
MONOCYTES # BLD AUTO: 0.4 K/UL (ref 0–0.85)
MONOCYTES NFR BLD AUTO: 11 % (ref 0–13.4)
N GONORRHOEA DNA GENITAL QL NAA+PROBE: NEGATIVE
NEUTROPHILS # BLD AUTO: 2.09 K/UL (ref 1.82–7.42)
NEUTROPHILS NFR BLD: 58 % (ref 44–72)
NITRITE UR QL STRIP.AUTO: NEGATIVE
NRBC # BLD AUTO: 0 K/UL
NRBC BLD-RTO: 0 /100 WBC (ref 0–0.2)
PH UR STRIP.AUTO: 7.5 [PH] (ref 5–8)
PLATELET # BLD AUTO: 413 K/UL (ref 164–446)
PLATELET BLD QL SMEAR: NORMAL
PMV BLD AUTO: 9.6 FL (ref 9–12.9)
POTASSIUM SERPL-SCNC: 3.8 MMOL/L (ref 3.6–5.5)
PROT SERPL-MCNC: 7.7 G/DL (ref 6–8.2)
PROT UR QL STRIP: NEGATIVE MG/DL
RBC # BLD AUTO: 4.3 M/UL (ref 4.2–5.4)
RBC BLD AUTO: NORMAL
RBC UR QL AUTO: NEGATIVE
SIGNIFICANT IND 70042: NORMAL
SITE SITE: NORMAL
SODIUM SERPL-SCNC: 138 MMOL/L (ref 135–145)
SOURCE SOURCE: NORMAL
SP GR UR STRIP.AUTO: 1.01
SPECIMEN SOURCE: NORMAL
WBC # BLD AUTO: 3.6 K/UL (ref 4.8–10.8)

## 2024-07-01 PROCEDURE — 87591 N.GONORRHOEAE DNA AMP PROB: CPT

## 2024-07-01 PROCEDURE — 81003 URINALYSIS AUTO W/O SCOPE: CPT

## 2024-07-01 PROCEDURE — 96375 TX/PRO/DX INJ NEW DRUG ADDON: CPT

## 2024-07-01 PROCEDURE — 700111 HCHG RX REV CODE 636 W/ 250 OVERRIDE (IP): Mod: JZ | Performed by: EMERGENCY MEDICINE

## 2024-07-01 PROCEDURE — 36415 COLL VENOUS BLD VENIPUNCTURE: CPT

## 2024-07-01 PROCEDURE — 76830 TRANSVAGINAL US NON-OB: CPT

## 2024-07-01 PROCEDURE — 84703 CHORIONIC GONADOTROPIN ASSAY: CPT

## 2024-07-01 PROCEDURE — 80053 COMPREHEN METABOLIC PANEL: CPT

## 2024-07-01 PROCEDURE — 87491 CHLMYD TRACH DNA AMP PROBE: CPT

## 2024-07-01 PROCEDURE — 85027 COMPLETE CBC AUTOMATED: CPT

## 2024-07-01 PROCEDURE — 96374 THER/PROPH/DIAG INJ IV PUSH: CPT

## 2024-07-01 PROCEDURE — 99284 EMERGENCY DEPT VISIT MOD MDM: CPT

## 2024-07-01 PROCEDURE — 85007 BL SMEAR W/DIFF WBC COUNT: CPT

## 2024-07-01 RX ORDER — PROCHLORPERAZINE EDISYLATE 5 MG/ML
5 INJECTION INTRAMUSCULAR; INTRAVENOUS ONCE
Status: COMPLETED | OUTPATIENT
Start: 2024-07-01 | End: 2024-07-01

## 2024-07-01 RX ORDER — ONDANSETRON 2 MG/ML
4 INJECTION INTRAMUSCULAR; INTRAVENOUS ONCE
Status: COMPLETED | OUTPATIENT
Start: 2024-07-01 | End: 2024-07-01

## 2024-07-01 RX ORDER — ONDANSETRON 4 MG/1
4 TABLET, ORALLY DISINTEGRATING ORAL EVERY 6 HOURS PRN
Qty: 5 TABLET | Refills: 0 | Status: SHIPPED | OUTPATIENT
Start: 2024-07-01

## 2024-07-01 RX ORDER — MORPHINE SULFATE 4 MG/ML
4 INJECTION INTRAVENOUS ONCE
Status: COMPLETED | OUTPATIENT
Start: 2024-07-01 | End: 2024-07-01

## 2024-07-01 RX ADMIN — ONDANSETRON 4 MG: 2 INJECTION INTRAMUSCULAR; INTRAVENOUS at 00:56

## 2024-07-01 RX ADMIN — MORPHINE SULFATE 4 MG: 4 INJECTION, SOLUTION INTRAMUSCULAR; INTRAVENOUS at 00:57

## 2024-07-01 RX ADMIN — PROCHLORPERAZINE EDISYLATE 5 MG: 5 INJECTION INTRAMUSCULAR; INTRAVENOUS at 02:36

## 2024-07-01 ASSESSMENT — FIBROSIS 4 INDEX: FIB4 SCORE: 0.3
